# Patient Record
Sex: MALE | Race: WHITE | Employment: OTHER | URBAN - METROPOLITAN AREA
[De-identification: names, ages, dates, MRNs, and addresses within clinical notes are randomized per-mention and may not be internally consistent; named-entity substitution may affect disease eponyms.]

---

## 2024-11-14 ENCOUNTER — HOSPITAL ENCOUNTER (EMERGENCY)
Facility: HOSPITAL | Age: 74
Discharge: HOME | End: 2024-11-14
Attending: EMERGENCY MEDICINE
Payer: MEDICARE

## 2024-11-14 VITALS
SYSTOLIC BLOOD PRESSURE: 136 MMHG | OXYGEN SATURATION: 98 % | RESPIRATION RATE: 11 BRPM | DIASTOLIC BLOOD PRESSURE: 73 MMHG | WEIGHT: 182 LBS | HEIGHT: 67 IN | HEART RATE: 67 BPM | BODY MASS INDEX: 28.56 KG/M2 | TEMPERATURE: 98 F

## 2024-11-14 DIAGNOSIS — G47.00 INSOMNIA, UNSPECIFIED TYPE: ICD-10-CM

## 2024-11-14 DIAGNOSIS — F32.A DEPRESSION, UNSPECIFIED DEPRESSION TYPE: ICD-10-CM

## 2024-11-14 DIAGNOSIS — T50.905A MEDICATION REACTION, INITIAL ENCOUNTER: Primary | ICD-10-CM

## 2024-11-14 LAB
ALBUMIN SERPL-MCNC: 4.1 G/DL (ref 3.2–4.8)
ALBUMIN/GLOB SERPL: 1.5 {RATIO} (ref 1–2)
ALP LIVER SERPL-CCNC: 63 U/L
ALT SERPL-CCNC: 13 U/L
ANION GAP SERPL CALC-SCNC: 5 MMOL/L (ref 0–18)
AST SERPL-CCNC: 22 U/L (ref ?–34)
BASOPHILS # BLD AUTO: 0.04 X10(3) UL (ref 0–0.2)
BASOPHILS NFR BLD AUTO: 0.6 %
BILIRUB SERPL-MCNC: 0.9 MG/DL (ref 0.2–1.1)
BILIRUB UR QL STRIP.AUTO: NEGATIVE
BUN BLD-MCNC: 21 MG/DL (ref 9–23)
CALCIUM BLD-MCNC: 10.1 MG/DL (ref 8.7–10.4)
CHLORIDE SERPL-SCNC: 106 MMOL/L (ref 98–112)
CLARITY UR REFRACT.AUTO: CLEAR
CO2 SERPL-SCNC: 29 MMOL/L (ref 21–32)
COLOR UR AUTO: COLORLESS
CREAT BLD-MCNC: 1.3 MG/DL
EGFRCR SERPLBLD CKD-EPI 2021: 58 ML/MIN/1.73M2 (ref 60–?)
EOSINOPHIL # BLD AUTO: 0.15 X10(3) UL (ref 0–0.7)
EOSINOPHIL NFR BLD AUTO: 2.4 %
ERYTHROCYTE [DISTWIDTH] IN BLOOD BY AUTOMATED COUNT: 13.2 %
GLOBULIN PLAS-MCNC: 2.8 G/DL (ref 2–3.5)
GLUCOSE BLD-MCNC: 137 MG/DL (ref 70–99)
GLUCOSE UR STRIP.AUTO-MCNC: NORMAL MG/DL
HCT VFR BLD AUTO: 36.5 %
HGB BLD-MCNC: 12.4 G/DL
IMM GRANULOCYTES # BLD AUTO: 0.01 X10(3) UL (ref 0–1)
IMM GRANULOCYTES NFR BLD: 0.2 %
KETONES UR STRIP.AUTO-MCNC: NEGATIVE MG/DL
LEUKOCYTE ESTERASE UR QL STRIP.AUTO: NEGATIVE
LYMPHOCYTES # BLD AUTO: 1.26 X10(3) UL (ref 1–4)
LYMPHOCYTES NFR BLD AUTO: 20.4 %
MCH RBC QN AUTO: 29.5 PG (ref 26–34)
MCHC RBC AUTO-ENTMCNC: 34 G/DL (ref 31–37)
MCV RBC AUTO: 86.7 FL
MONOCYTES # BLD AUTO: 0.4 X10(3) UL (ref 0.1–1)
MONOCYTES NFR BLD AUTO: 6.5 %
NEUTROPHILS # BLD AUTO: 4.31 X10 (3) UL (ref 1.5–7.7)
NEUTROPHILS # BLD AUTO: 4.31 X10(3) UL (ref 1.5–7.7)
NEUTROPHILS NFR BLD AUTO: 69.9 %
NITRITE UR QL STRIP.AUTO: NEGATIVE
OSMOLALITY SERPL CALC.SUM OF ELEC: 295 MOSM/KG (ref 275–295)
PH UR STRIP.AUTO: 6 [PH] (ref 5–8)
PLATELET # BLD AUTO: 178 10(3)UL (ref 150–450)
POTASSIUM SERPL-SCNC: 3.7 MMOL/L (ref 3.5–5.1)
PROT SERPL-MCNC: 6.9 G/DL (ref 5.7–8.2)
PROT UR STRIP.AUTO-MCNC: NEGATIVE MG/DL
RBC # BLD AUTO: 4.21 X10(6)UL
SODIUM SERPL-SCNC: 140 MMOL/L (ref 136–145)
SP GR UR STRIP.AUTO: 1.01 (ref 1–1.03)
TSI SER-ACNC: 0.89 UIU/ML (ref 0.55–4.78)
UROBILINOGEN UR STRIP.AUTO-MCNC: NORMAL MG/DL
WBC # BLD AUTO: 6.2 X10(3) UL (ref 4–11)

## 2024-11-14 PROCEDURE — 84443 ASSAY THYROID STIM HORMONE: CPT | Performed by: EMERGENCY MEDICINE

## 2024-11-14 PROCEDURE — 99284 EMERGENCY DEPT VISIT MOD MDM: CPT

## 2024-11-14 PROCEDURE — 36415 COLL VENOUS BLD VENIPUNCTURE: CPT

## 2024-11-14 PROCEDURE — 99283 EMERGENCY DEPT VISIT LOW MDM: CPT

## 2024-11-14 PROCEDURE — 85025 COMPLETE CBC W/AUTO DIFF WBC: CPT | Performed by: EMERGENCY MEDICINE

## 2024-11-14 PROCEDURE — 81001 URINALYSIS AUTO W/SCOPE: CPT | Performed by: EMERGENCY MEDICINE

## 2024-11-14 PROCEDURE — 80053 COMPREHEN METABOLIC PANEL: CPT | Performed by: EMERGENCY MEDICINE

## 2024-11-14 RX ORDER — MAGNESIUM OXIDE 400 MG/1
400 TABLET ORAL NIGHTLY
Qty: 30 TABLET | Refills: 0 | Status: ON HOLD | OUTPATIENT
Start: 2024-11-14

## 2024-11-14 RX ORDER — BUPROPION HYDROCHLORIDE 150 MG/1
150 TABLET ORAL DAILY
Status: ON HOLD | COMMUNITY
Start: 2024-11-11

## 2024-11-14 RX ORDER — TEMAZEPAM 7.5 MG/1
7.5 CAPSULE ORAL NIGHTLY PRN
Qty: 10 CAPSULE | Refills: 0 | Status: ON HOLD | OUTPATIENT
Start: 2024-11-14

## 2024-11-14 RX ORDER — ALFUZOSIN HYDROCHLORIDE 10 MG/1
10 TABLET, EXTENDED RELEASE ORAL NIGHTLY
Status: ON HOLD | COMMUNITY
Start: 2024-08-30

## 2024-11-14 RX ORDER — METOPROLOL SUCCINATE 25 MG/1
25 TABLET, EXTENDED RELEASE ORAL DAILY
Status: ON HOLD | COMMUNITY
Start: 2024-06-14

## 2024-11-14 NOTE — ED INITIAL ASSESSMENT (HPI)
Pt in from home with wife and daughter. Pt's daughter reports hx of UTIs and he has had a catheter x2 months. Pt A&Ox4. Pt reports intermittent confusion. Pt was put on bupropion and Remeron on Tuesday  when he went to [facility]. Pt did not meet criteria for inpatient admission. Pt has had mood changes, lethargy, increased confusion.

## 2024-11-16 PROBLEM — F33.2 MDD (MAJOR DEPRESSIVE DISORDER), RECURRENT SEVERE, WITHOUT PSYCHOSIS (HCC): Status: ACTIVE | Noted: 2024-11-16

## 2024-11-17 ENCOUNTER — EXTERNAL LAB (OUTPATIENT)
Dept: HEALTH INFORMATION MANAGEMENT | Facility: OTHER | Age: 74
End: 2024-11-17

## 2024-11-17 LAB
ALBUMIN SERPL-MCNC: 3.6 G/DL (ref 3.2–4.8)
ALBUMIN/GLOB SERPL: 1.2 {RATIO} (ref 1–2)
ALP SERPL-CCNC: 51 U/L (ref 45–117)
ALT SERPL-CCNC: 13 U/L (ref 10–49)
ANION GAP SERPL CALC-SCNC: 2 MMOL/L (ref 0–18)
AST SERPL-CCNC: 16 U/L
BASOPHILS # BLD: 0.05 X10(3) UL (ref 0–0.2)
BASOPHILS NFR BLD: 0.8 %
BILIRUB SERPL-MCNC: 0.8 MG/DL (ref 0.2–1.1)
BUN SERPL-MCNC: 16 MG/DL (ref 9–23)
CALCIUM SERPL-MCNC: 9.8 MG/DL (ref 8.7–10.4)
CALCULATED OSMO: 291 MOSM/KG (ref 275–295)
CHLORIDE SERPL-SCNC: 110 MMOL/L (ref 98–112)
CHOLEST SERPL-MCNC: 120 MG/DL
CO2 SERPL-SCNC: 28 MMOL/L (ref 21–32)
CREAT SERPL-MCNC: 1.15 MG/DL (ref 0.7–1.3)
EOSINOPHIL # BLD: 0.35 X10(3) UL (ref 0–0.7)
EOSINOPHIL NFR BLD: 5.4 %
ERYTHROCYTE [DISTWIDTH] IN BLOOD: 13.2 %
EST. AVERAGE GLUCOSE BLD GHB EST-MCNC: 123 MG/DL (ref 68–126)
GFR SERPLBLD SCHWARTZ-ARVRAT: 67 ML/MIN/1.73M2
GLOBULIN SER-MCNC: 2.9 G/DL (ref 2–3.5)
GLUCOSE SERPL-MCNC: 88 MG/DL (ref 70–99)
HBA1C MFR BLD: 5.9 %
HCT VFR BLD CALC: 32.3 % (ref 39–53)
HDLC SERPL-MCNC: 52 MG/DL (ref 40–59)
HGB BLD-MCNC: 11.6 G/DL (ref 13–17.5)
IMM GRANULOCYTES # BLD: 0.01 X10(3) UL (ref 0–1)
IMM GRANULOCYTES NFR BLD: 0.2 %
LDLC SERPL CALC-MCNC: 49 MG/DL
LENGTH OF FAST TIME PATIENT: NORMAL H
LENGTH OF FAST TIME PATIENT: NORMAL H
LYMPHOCYTES # BLD: 1.96 X10(3) UL (ref 1–4)
LYMPHOCYTES NFR BLD: 30.1 %
MCH RBC QN AUTO: 29.8 PG (ref 26–34)
MCHC RBC AUTO-ENTMCNC: 35.9 G/DL (ref 31–37)
MCV RBC AUTO: 83 FL (ref 80–100)
MONOCYTES # BLD: 0.53 X10(3) UL (ref 0.1–1)
MONOCYTES NFR BLD: 8.1 %
NEUTROPHILS # BLD: 3.62 X10(3) UL (ref 1.5–7.7)
NEUTROPHILS NFR BLD: 55.4 %
NONHDLC SERPL-MCNC: 68 MG/DL
PLATELET # BLD: 182 10(3)UL (ref 150–450)
POTASSIUM SERPL-SCNC: 3.7 MMOL/L (ref 3.5–5.1)
PROT SERPL-MCNC: 6.5 G/DL (ref 5.7–8.2)
RBC # BLD: 3.89 X10(6)UL (ref 3.8–5.8)
SODIUM SERPL-SCNC: 140 MMOL/L (ref 136–145)
TRIGL SERPL-MCNC: 102 MG/DL (ref 30–149)
VLDLC SERPL CALC-MCNC: 15 MG/DL (ref 0–30)
WBC # BLD: 6.5 X10(3) UL (ref 4–11)

## 2024-11-18 PROBLEM — F41.1 GENERALIZED ANXIETY DISORDER: Status: ACTIVE | Noted: 2024-11-18

## 2024-11-18 PROBLEM — F51.05 INSOMNIA RELATED TO ANOTHER MENTAL DISORDER: Status: ACTIVE | Noted: 2024-11-18

## 2024-11-26 RX ORDER — METOPROLOL SUCCINATE 25 MG/1
25 TABLET, EXTENDED RELEASE ORAL EVERY MORNING
COMMUNITY

## 2024-11-26 RX ORDER — SULFAMETHOXAZOLE AND TRIMETHOPRIM 800; 160 MG/1; MG/1
1 TABLET ORAL 2 TIMES DAILY
COMMUNITY

## 2024-11-26 RX ORDER — DOXEPIN HYDROCHLORIDE 50 MG/1
50 CAPSULE ORAL NIGHTLY
COMMUNITY

## 2024-11-26 RX ORDER — DULOXETIN HYDROCHLORIDE 30 MG/1
30 CAPSULE, DELAYED RELEASE ORAL EVERY MORNING
COMMUNITY

## 2024-11-26 RX ORDER — LORAZEPAM 0.5 MG/1
0.5 TABLET ORAL PRN
COMMUNITY

## 2024-11-26 RX ORDER — PRAVASTATIN SODIUM 40 MG
40 TABLET ORAL NIGHTLY
COMMUNITY

## 2024-11-26 RX ORDER — MULTIVIT-MIN/IRON/FOLIC ACID/K 18-600-40
1 CAPSULE ORAL DAILY
COMMUNITY

## 2024-11-26 RX ORDER — LISINOPRIL AND HYDROCHLOROTHIAZIDE 10; 12.5 MG/1; MG/1
1 TABLET ORAL EVERY MORNING
COMMUNITY

## 2024-11-26 ASSESSMENT — ACTIVITIES OF DAILY LIVING (ADL)
ADL_SCORE: 12
ADL_BEFORE_ADMISSION: INDEPENDENT
SENSORY_SUPPORT_DEVICES: EYEGLASSES

## 2024-11-28 ENCOUNTER — ANESTHESIA EVENT (OUTPATIENT)
Dept: SURGERY | Age: 74
End: 2024-11-28

## 2024-11-29 ENCOUNTER — HOSPITAL ENCOUNTER (INPATIENT)
Age: 74
LOS: 1 days | Discharge: HOME | DRG: 714 | End: 2024-11-30
Attending: UROLOGY | Admitting: UROLOGY

## 2024-11-29 ENCOUNTER — ANESTHESIA (OUTPATIENT)
Dept: SURGERY | Age: 74
End: 2024-11-29

## 2024-11-29 PROCEDURE — 13000002 HB ANESTHESIA  GENERAL  S/U + 1ST 15 MIN: Performed by: UROLOGY

## 2024-11-29 PROCEDURE — 88305 TISSUE EXAM BY PATHOLOGIST: CPT | Performed by: UROLOGY

## 2024-11-29 PROCEDURE — 10002800 HB RX 250 W HCPCS: Performed by: UROLOGY

## 2024-11-29 PROCEDURE — 10004451 HB PACU RECOVERY 1ST 30 MINUTES: Performed by: UROLOGY

## 2024-11-29 PROCEDURE — 10002803 HB RX 637: Performed by: UROLOGY

## 2024-11-29 PROCEDURE — 10002807 HB RX 258: Performed by: SPECIALIST

## 2024-11-29 PROCEDURE — 10002803 HB RX 637: Performed by: SPECIALIST

## 2024-11-29 PROCEDURE — 13000037 HB COMPLEX CASE EACH ADD MINUTE: Performed by: UROLOGY

## 2024-11-29 PROCEDURE — 0TBC8ZZ EXCISION OF BLADDER NECK, VIA NATURAL OR ARTIFICIAL OPENING ENDOSCOPIC: ICD-10-PCS | Performed by: UROLOGY

## 2024-11-29 PROCEDURE — 10002800 HB RX 250 W HCPCS

## 2024-11-29 PROCEDURE — G0378 HOSPITAL OBSERVATION PER HR: HCPCS

## 2024-11-29 PROCEDURE — 10002801 HB RX 250 W/O HCPCS

## 2024-11-29 PROCEDURE — 10006023 HB SUPPLY 272: Performed by: UROLOGY

## 2024-11-29 PROCEDURE — 10002807 HB RX 258

## 2024-11-29 PROCEDURE — C1758 CATHETER, URETERAL: HCPCS | Performed by: UROLOGY

## 2024-11-29 PROCEDURE — 10004651 HB RX, NO CHARGE ITEM: Performed by: UROLOGY

## 2024-11-29 PROCEDURE — 10002801 HB RX 250 W/O HCPCS: Performed by: UROLOGY

## 2024-11-29 PROCEDURE — 0V508ZZ DESTRUCTION OF PROSTATE, VIA NATURAL OR ARTIFICIAL OPENING ENDOSCOPIC: ICD-10-PCS | Performed by: UROLOGY

## 2024-11-29 PROCEDURE — 10004452 HB PACU ADDL 30 MINUTES: Performed by: UROLOGY

## 2024-11-29 PROCEDURE — 13000003 HB ANESTHESIA  GENERAL EA ADD MINUTE: Performed by: UROLOGY

## 2024-11-29 PROCEDURE — 13000036 HB COMPLEX  CASE S/U + 1ST 15 MIN: Performed by: UROLOGY

## 2024-11-29 RX ORDER — ONDANSETRON 2 MG/ML
4 INJECTION INTRAMUSCULAR; INTRAVENOUS 2 TIMES DAILY PRN
Status: DISCONTINUED | OUTPATIENT
Start: 2024-11-29 | End: 2024-11-29 | Stop reason: HOSPADM

## 2024-11-29 RX ORDER — SODIUM CHLORIDE, SODIUM LACTATE, POTASSIUM CHLORIDE, CALCIUM CHLORIDE 600; 310; 30; 20 MG/100ML; MG/100ML; MG/100ML; MG/100ML
INJECTION, SOLUTION INTRAVENOUS CONTINUOUS PRN
Status: DISCONTINUED | OUTPATIENT
Start: 2024-11-29 | End: 2024-11-29

## 2024-11-29 RX ORDER — DROPERIDOL 2.5 MG/ML
0.62 INJECTION, SOLUTION INTRAMUSCULAR; INTRAVENOUS
Status: DISCONTINUED | OUTPATIENT
Start: 2024-11-29 | End: 2024-11-29 | Stop reason: HOSPADM

## 2024-11-29 RX ORDER — 0.9 % SODIUM CHLORIDE 0.9 %
10 VIAL (ML) INJECTION PRN
Status: DISCONTINUED | OUTPATIENT
Start: 2024-11-29 | End: 2024-11-30 | Stop reason: HOSPADM

## 2024-11-29 RX ORDER — SULFAMETHOXAZOLE AND TRIMETHOPRIM 800; 160 MG/1; MG/1
1 TABLET ORAL 2 TIMES DAILY
Status: DISCONTINUED | OUTPATIENT
Start: 2024-11-29 | End: 2024-11-30 | Stop reason: HOSPADM

## 2024-11-29 RX ORDER — DEXAMETHASONE SODIUM PHOSPHATE 4 MG/ML
INJECTION, SOLUTION INTRA-ARTICULAR; INTRALESIONAL; INTRAMUSCULAR; INTRAVENOUS; SOFT TISSUE PRN
Status: DISCONTINUED | OUTPATIENT
Start: 2024-11-29 | End: 2024-11-29

## 2024-11-29 RX ORDER — TRAMADOL HYDROCHLORIDE 50 MG/1
50 TABLET ORAL EVERY 6 HOURS PRN
Status: DISCONTINUED | OUTPATIENT
Start: 2024-11-29 | End: 2024-11-30 | Stop reason: HOSPADM

## 2024-11-29 RX ORDER — ENALAPRILAT 1.25 MG/ML
1.25 INJECTION INTRAVENOUS
Status: DISCONTINUED | OUTPATIENT
Start: 2024-11-29 | End: 2024-11-29 | Stop reason: HOSPADM

## 2024-11-29 RX ORDER — PROCHLORPERAZINE EDISYLATE 5 MG/ML
5 INJECTION INTRAMUSCULAR; INTRAVENOUS EVERY 4 HOURS PRN
Status: DISCONTINUED | OUTPATIENT
Start: 2024-11-29 | End: 2024-11-29 | Stop reason: HOSPADM

## 2024-11-29 RX ORDER — ROCURONIUM BROMIDE 10 MG/ML
INJECTION, SOLUTION INTRAVENOUS PRN
Status: DISCONTINUED | OUTPATIENT
Start: 2024-11-29 | End: 2024-11-29

## 2024-11-29 RX ORDER — FAMOTIDINE 20 MG/1
20 TABLET, FILM COATED ORAL ONCE
Status: COMPLETED | OUTPATIENT
Start: 2024-11-29 | End: 2024-11-29

## 2024-11-29 RX ORDER — OXYBUTYNIN CHLORIDE 5 MG/1
5 TABLET ORAL 3 TIMES DAILY PRN
Status: DISCONTINUED | OUTPATIENT
Start: 2024-11-29 | End: 2024-11-30 | Stop reason: HOSPADM

## 2024-11-29 RX ORDER — MEPERIDINE HYDROCHLORIDE 50 MG/ML
12.5 INJECTION INTRAMUSCULAR; INTRAVENOUS; SUBCUTANEOUS EVERY 10 MIN PRN
Status: DISCONTINUED | OUTPATIENT
Start: 2024-11-29 | End: 2024-11-29 | Stop reason: HOSPADM

## 2024-11-29 RX ORDER — SODIUM CHLORIDE, SODIUM LACTATE, POTASSIUM CHLORIDE, CALCIUM CHLORIDE 600; 310; 30; 20 MG/100ML; MG/100ML; MG/100ML; MG/100ML
INJECTION, SOLUTION INTRAVENOUS CONTINUOUS
Status: DISCONTINUED | OUTPATIENT
Start: 2024-11-29 | End: 2024-11-29

## 2024-11-29 RX ORDER — OXYCODONE HYDROCHLORIDE 5 MG/1
5 TABLET ORAL
Status: DISCONTINUED | OUTPATIENT
Start: 2024-11-29 | End: 2024-11-29 | Stop reason: HOSPADM

## 2024-11-29 RX ORDER — PHENAZOPYRIDINE HYDROCHLORIDE 200 MG/1
200 TABLET, FILM COATED ORAL 3 TIMES DAILY PRN
Status: DISCONTINUED | OUTPATIENT
Start: 2024-11-29 | End: 2024-11-30 | Stop reason: HOSPADM

## 2024-11-29 RX ORDER — ONDANSETRON 2 MG/ML
INJECTION INTRAMUSCULAR; INTRAVENOUS PRN
Status: DISCONTINUED | OUTPATIENT
Start: 2024-11-29 | End: 2024-11-29

## 2024-11-29 RX ORDER — HYDRALAZINE HYDROCHLORIDE 20 MG/ML
5 INJECTION INTRAMUSCULAR; INTRAVENOUS EVERY 10 MIN PRN
Status: DISCONTINUED | OUTPATIENT
Start: 2024-11-29 | End: 2024-11-29 | Stop reason: HOSPADM

## 2024-11-29 RX ORDER — HYDROCODONE BITARTRATE AND ACETAMINOPHEN 5; 325 MG/1; MG/1
1 TABLET ORAL
Status: DISCONTINUED | OUTPATIENT
Start: 2024-11-29 | End: 2024-11-29 | Stop reason: HOSPADM

## 2024-11-29 RX ORDER — 0.9 % SODIUM CHLORIDE 0.9 %
2 VIAL (ML) INJECTION EVERY 12 HOURS SCHEDULED
Status: DISCONTINUED | OUTPATIENT
Start: 2024-11-29 | End: 2024-11-30 | Stop reason: HOSPADM

## 2024-11-29 RX ORDER — PROMETHAZINE HYDROCHLORIDE 25 MG/1
25 TABLET ORAL EVERY 6 HOURS PRN
Status: DISCONTINUED | OUTPATIENT
Start: 2024-11-29 | End: 2024-11-29 | Stop reason: HOSPADM

## 2024-11-29 RX ORDER — PROPOFOL 10 MG/ML
INJECTION, EMULSION INTRAVENOUS PRN
Status: DISCONTINUED | OUTPATIENT
Start: 2024-11-29 | End: 2024-11-29

## 2024-11-29 RX ORDER — EPHEDRINE SULFATE/0.9% NACL/PF 50 MG/10ML
SYRINGE (ML) INTRAVENOUS PRN
Status: DISCONTINUED | OUTPATIENT
Start: 2024-11-29 | End: 2024-11-29

## 2024-11-29 RX ADMIN — SODIUM CHLORIDE, PRESERVATIVE FREE 2 ML: 5 INJECTION INTRAVENOUS at 20:43

## 2024-11-29 RX ADMIN — SODIUM CHLORIDE, POTASSIUM CHLORIDE, SODIUM LACTATE AND CALCIUM CHLORIDE: 600; 310; 30; 20 INJECTION, SOLUTION INTRAVENOUS at 07:59

## 2024-11-29 RX ADMIN — FAMOTIDINE 20 MG: 20 TABLET ORAL at 06:25

## 2024-11-29 RX ADMIN — FENTANYL CITRATE 50 MCG: 50 INJECTION INTRAMUSCULAR; INTRAVENOUS at 09:06

## 2024-11-29 RX ADMIN — EPHEDRINE SULFATE 10 MG: 5 INJECTION INTRAVENOUS at 08:46

## 2024-11-29 RX ADMIN — SUGAMMADEX 200 MG: 100 INJECTION, SOLUTION INTRAVENOUS at 10:02

## 2024-11-29 RX ADMIN — SULFAMETHOXAZOLE AND TRIMETHOPRIM 1 TABLET: 800; 160 TABLET ORAL at 20:43

## 2024-11-29 RX ADMIN — DEXAMETHASONE SODIUM PHOSPHATE 4 MG: 4 INJECTION INTRA-ARTICULAR; INTRALESIONAL; INTRAMUSCULAR; INTRAVENOUS; SOFT TISSUE at 08:19

## 2024-11-29 RX ADMIN — PROPOFOL 200 MG: 10 INJECTION, EMULSION INTRAVENOUS at 08:06

## 2024-11-29 RX ADMIN — HYDROMORPHONE HYDROCHLORIDE 0.2 MG: 1 INJECTION, SOLUTION INTRAMUSCULAR; INTRAVENOUS; SUBCUTANEOUS at 10:50

## 2024-11-29 RX ADMIN — ROCURONIUM BROMIDE 50 MG: 10 INJECTION INTRAVENOUS at 08:06

## 2024-11-29 RX ADMIN — SODIUM CHLORIDE, POTASSIUM CHLORIDE, SODIUM LACTATE AND CALCIUM CHLORIDE: 600; 310; 30; 20 INJECTION, SOLUTION INTRAVENOUS at 12:22

## 2024-11-29 RX ADMIN — HYDROMORPHONE HYDROCHLORIDE 0.2 MG: 1 INJECTION, SOLUTION INTRAMUSCULAR; INTRAVENOUS; SUBCUTANEOUS at 11:00

## 2024-11-29 RX ADMIN — ONDANSETRON 4 MG: 2 INJECTION INTRAMUSCULAR; INTRAVENOUS at 10:02

## 2024-11-29 RX ADMIN — EPHEDRINE SULFATE 10 MG: 5 INJECTION INTRAVENOUS at 08:25

## 2024-11-29 RX ADMIN — SODIUM CHLORIDE, POTASSIUM CHLORIDE, SODIUM LACTATE AND CALCIUM CHLORIDE: 600; 310; 30; 20 INJECTION, SOLUTION INTRAVENOUS at 06:36

## 2024-11-29 RX ADMIN — ROCURONIUM BROMIDE 20 MG: 10 INJECTION INTRAVENOUS at 09:03

## 2024-11-29 RX ADMIN — EPHEDRINE SULFATE 15 MG: 5 INJECTION INTRAVENOUS at 08:50

## 2024-11-29 RX ADMIN — FENTANYL CITRATE 50 MCG: 50 INJECTION INTRAMUSCULAR; INTRAVENOUS at 08:01

## 2024-11-29 RX ADMIN — EPHEDRINE SULFATE 15 MG: 5 INJECTION INTRAVENOUS at 08:38

## 2024-11-29 RX ADMIN — WATER 2000 MG: 1 INJECTION INTRAMUSCULAR; INTRAVENOUS; SUBCUTANEOUS at 08:10

## 2024-11-29 SDOH — HEALTH STABILITY: GENERAL: BECAUSE OF A PHYSICAL, MENTAL, OR EMOTIONAL CONDITION, DO YOU HAVE DIFFICULTY DOING ERRANDS ALONE?: NO

## 2024-11-29 SDOH — ECONOMIC STABILITY: GENERAL

## 2024-11-29 SDOH — ECONOMIC STABILITY: TRANSPORTATION INSECURITY
IN THE PAST 12 MONTHS, HAS LACK OF RELIABLE TRANSPORTATION KEPT YOU FROM MEDICAL APPOINTMENTS, MEETINGS, WORK OR FROM GETTING THINGS NEEDED FOR DAILY LIVING?: NO;YES

## 2024-11-29 SDOH — SOCIAL STABILITY: SOCIAL INSECURITY: HOW OFTEN DOES ANYONE, INCLUDING FAMILY AND FRIENDS, SCREAM OR CURSE AT YOU?: NEVER

## 2024-11-29 SDOH — SOCIAL STABILITY: SOCIAL INSECURITY: HOW OFTEN DOES ANYONE, INCLUDING FAMILY AND FRIENDS, PHYSICALLY HURT YOU?: NEVER

## 2024-11-29 SDOH — HEALTH STABILITY: PHYSICAL HEALTH: DO YOU HAVE DIFFICULTY DRESSING OR BATHING?: NO

## 2024-11-29 SDOH — SOCIAL STABILITY: SOCIAL INSECURITY: HOW OFTEN DOES ANYONE, INCLUDING FAMILY AND FRIENDS, INSULT OR TALK DOWN TO YOU?: NEVER

## 2024-11-29 SDOH — ECONOMIC STABILITY: FOOD INSECURITY: WITHIN THE PAST 12 MONTHS, THE FOOD YOU BOUGHT JUST DIDN'T LAST AND YOU DIDN'T HAVE MONEY TO GET MORE.: NEVER TRUE

## 2024-11-29 SDOH — SOCIAL STABILITY: SOCIAL INSECURITY: HOW OFTEN DOES ANYONE, INCLUDING FAMILY AND FRIENDS, THREATEN YOU WITH HARM?: NEVER

## 2024-11-29 SDOH — HEALTH STABILITY: GENERAL
BECAUSE OF A PHYSICAL, MENTAL, OR EMOTIONAL CONDITION, DO YOU HAVE SERIOUS DIFFICULTY CONCENTRATING, REMEMBERING OR MAKING DECISIONS?: YES

## 2024-11-29 SDOH — ECONOMIC STABILITY: HOUSING INSECURITY: DO YOU HAVE PROBLEMS WITH ANY OF THE FOLLOWING?: NONE OF THE ABOVE

## 2024-11-29 SDOH — ECONOMIC STABILITY: HOUSING INSECURITY: WHAT IS YOUR LIVING SITUATION TODAY?: SPOUSE

## 2024-11-29 SDOH — ECONOMIC STABILITY: HOUSING INSECURITY: WHAT IS YOUR LIVING SITUATION TODAY?: I HAVE A STEADY PLACE TO LIVE

## 2024-11-29 SDOH — ECONOMIC STABILITY: INCOME INSECURITY: IN THE PAST 12 MONTHS, HAS THE ELECTRIC, GAS, OIL, OR WATER COMPANY THREATENED TO SHUT OFF SERVICE IN YOUR HOME?: NO

## 2024-11-29 SDOH — SOCIAL STABILITY: SOCIAL NETWORK: SUPPORT SYSTEMS: SPOUSE;CHILDREN;SIBLINGS

## 2024-11-29 SDOH — ECONOMIC STABILITY: GENERAL: WOULD YOU LIKE HELP WITH ANY OF THE FOLLOWING NEEDS?: I DON'T WANT HELP WITH ANY OF THESE

## 2024-11-29 SDOH — HEALTH STABILITY: PHYSICAL HEALTH: DO YOU HAVE SERIOUS DIFFICULTY WALKING OR CLIMBING STAIRS?: NO

## 2024-11-29 SDOH — ECONOMIC STABILITY: HOUSING INSECURITY: WHAT IS YOUR LIVING SITUATION TODAY?: HOUSE

## 2024-11-29 ASSESSMENT — PATIENT HEALTH QUESTIONNAIRE - PHQ9
2. FEELING DOWN, DEPRESSED OR HOPELESS: NEARLY EVERY DAY
SUM OF ALL RESPONSES TO PHQ QUESTIONS 1-9: 18
SUM OF ALL RESPONSES TO PHQ9 QUESTIONS 1 AND 2: 3
CLINICAL INTERPRETATION OF PHQ9 SCORE: MODERATELY SEVERE DEPRESSION
6. FEELING BAD ABOUT YOURSELF - OR THAT YOU ARE A FAILURE OR HAVE LET YOURSELF OR YOUR FAMILY DOWN: NEARLY EVERY DAY
3. TROUBLE FALLING OR STAYING ASLEEP OR SLEEPING TOO MUCH: NEARLY EVERY DAY
9. THOUGHTS THAT YOU WOULD BE BETTER OFF DEAD, OR OF HURTING YOURSELF: SEVERAL DAYS
7. TROUBLE CONCENTRATING ON THINGS, SUCH AS READING THE NEWSPAPER OR WATCHING TELEVISION: NEARLY EVERY DAY
5. POOR APPETITE OR OVEREATING: SEVERAL DAYS
8. MOVING OR SPEAKING SO SLOWLY THAT OTHER PEOPLE COULD HAVE NOTICED. OR THE OPPOSITE, BEING SO FIGETY OR RESTLESS THAT YOU HAVE BEEN MOVING AROUND A LOT MORE THAN USUAL: NEARLY EVERY DAY
10. IF YOU CHECKED OFF ANY PROBLEMS, HOW DIFFICULT HAVE THESE PROBLEMS MADE IT FOR YOU TO DO YOUR WORK, TAKE CARE OF THINGS AT HOME, OR GET ALONG WITH OTHER PEOPLE: VERY DIFFICULT
1. LITTLE INTEREST OR PLEASURE IN DOING THINGS: NOT AT ALL
CLINICAL INTERPRETATION OF PHQ2 SCORE: FURTHER SCREENING NEEDED
IS PATIENT ABLE TO COMPLETE PHQ2 OR PHQ9: YES
SUM OF ALL RESPONSES TO PHQ9 QUESTIONS 1 AND 2: 3
4. FEELING TIRED OR HAVING LITTLE ENERGY: SEVERAL DAYS

## 2024-11-29 ASSESSMENT — ACTIVITIES OF DAILY LIVING (ADL)
RECENT_DECLINE_ADL: NO
ADL_SCORE: 10
CONTINENCE: INDEPENDENT
BATHING: NEEDS ASSISTANCE
ADL_SHORT_OF_BREATH: NO
TRANSFERRING: INDEPENDENT
DRESSING YOURSELF: NEEDS ASSISTANCE
TOILETING: INDEPENDENT
FEEDING YOURSELF: INDEPENDENT
ADL_BEFORE_ADMISSION: NEEDS/REQUIRES ASSISTANCE
CHRONIC_PAIN_PRESENT: NO

## 2024-11-29 ASSESSMENT — PAIN SCALES - GENERAL
PAINLEVEL_OUTOF10: 0
PAINLEVEL_OUTOF10: 0
PAINLEVEL_OUTOF10: 4
PAINLEVEL_OUTOF10: 6
PAINLEVEL_OUTOF10: 4
PAINLEVEL_OUTOF10: 2
PAINLEVEL_OUTOF10: 0
PAINLEVEL_OUTOF10: 6
PAINLEVEL_OUTOF10: 4
PAINLEVEL_OUTOF10: 0

## 2024-11-29 ASSESSMENT — COLUMBIA-SUICIDE SEVERITY RATING SCALE - C-SSRS
6. HAVE YOU EVER DONE ANYTHING, STARTED TO DO ANYTHING, OR PREPARED TO DO ANYTHING TO END YOUR LIFE?: YES
HOW LONG AGO DID YOU DO ANY OF THESE?: OVER A YEAR AGO
5. HAVE YOU STARTED TO WORK OUT OR WORKED OUT THE DETAILS OF HOW TO KILL YOURSELF? DO YOU INTEND TO CARRY OUT THIS PLAN?: NO
3. HAVE YOU BEEN THINKING ABOUT HOW YOU MIGHT KILL YOURSELF?: NO
1. WITHIN THE PAST MONTH, HAVE YOU WISHED YOU WERE DEAD OR WISHED YOU COULD GO TO SLEEP AND NOT WAKE UP?: YES
2. HAVE YOU ACTUALLY HAD ANY THOUGHTS OF KILLING YOURSELF?: YES
IS THE PATIENT ABLE TO COMPLETE C-SSRS: YES
4. HAVE YOU HAD THESE THOUGHTS AND HAD SOME INTENTION OF ACTING ON THEM?: NO

## 2024-11-29 ASSESSMENT — ORIENTATION MEMORY CONCENTRATION TEST (OMCT)
WHAT MONTH IS IT NOW: CORRECT
REPEAT THE NAME AND ADDRESS I ASKED YOU TO REMEMBER: 1 ERROR
WHAT TIME IS IT (NO WATCH OR CLOCK): CORRECT
WHAT YEAR IS IT NOW (MUST BE EXACT): CORRECT
COUNT BACKWARDS FROM 20 TO 1: CORRECT
OMCT SCORE: 2
OMCT INTERPRETATION: 0-6: NO SIGNIFICANT IMPAIRMENT
SAY THE MONTHS IN REVERSE ORDER STARTING WITH LAST MONTH: CORRECT

## 2024-11-29 ASSESSMENT — LIFESTYLE VARIABLES
HOW MANY STANDARD DRINKS CONTAINING ALCOHOL DO YOU HAVE ON A TYPICAL DAY: 0,1 OR 2
ALCOHOL_USE_STATUS: NO OR LOW RISK WITH VALIDATED TOOL
AUDIT-C TOTAL SCORE: 1
HOW OFTEN DO YOU HAVE A DRINK CONTAINING ALCOHOL: NEVER
HOW OFTEN DO YOU HAVE 6 OR MORE DRINKS ON ONE OCCASION: NEVER
HOW OFTEN DO YOU HAVE A DRINK CONTAINING ALCOHOL: MONTHLY OR LESS

## 2024-11-29 ASSESSMENT — PAIN DESCRIPTION - PAIN TYPE
TYPE: ACUTE PAIN

## 2024-11-30 VITALS
SYSTOLIC BLOOD PRESSURE: 139 MMHG | RESPIRATION RATE: 16 BRPM | HEART RATE: 87 BPM | OXYGEN SATURATION: 95 % | WEIGHT: 180.12 LBS | HEIGHT: 67 IN | BODY MASS INDEX: 28.27 KG/M2 | TEMPERATURE: 98.4 F | DIASTOLIC BLOOD PRESSURE: 76 MMHG

## 2024-11-30 PROCEDURE — 10000002 HB ROOM CHARGE MED SURG

## 2024-11-30 PROCEDURE — 10004651 HB RX, NO CHARGE ITEM: Performed by: UROLOGY

## 2024-11-30 PROCEDURE — G0378 HOSPITAL OBSERVATION PER HR: HCPCS

## 2024-11-30 PROCEDURE — 10002803 HB RX 637: Performed by: UROLOGY

## 2024-11-30 RX ORDER — METOPROLOL SUCCINATE 25 MG/1
25 TABLET, EXTENDED RELEASE ORAL EVERY MORNING
Status: DISCONTINUED | OUTPATIENT
Start: 2024-11-30 | End: 2024-11-30 | Stop reason: HOSPADM

## 2024-11-30 RX ADMIN — SULFAMETHOXAZOLE AND TRIMETHOPRIM 1 TABLET: 800; 160 TABLET ORAL at 09:02

## 2024-11-30 RX ADMIN — LISINOPRIL: 10 TABLET ORAL at 11:30

## 2024-11-30 RX ADMIN — SODIUM CHLORIDE, PRESERVATIVE FREE 2 ML: 5 INJECTION INTRAVENOUS at 09:02

## 2024-11-30 RX ADMIN — METOPROLOL SUCCINATE 25 MG: 25 TABLET, EXTENDED RELEASE ORAL at 11:30

## 2024-11-30 ASSESSMENT — PAIN SCALES - GENERAL: PAINLEVEL_OUTOF10: 0

## 2024-12-02 ENCOUNTER — TELEPHONE (OUTPATIENT)
Dept: CARE COORDINATION | Age: 74
End: 2024-12-02

## 2024-12-03 LAB
ASR DISCLAIMER: NORMAL
CASE RPRT: NORMAL
CLINICAL INFO: NORMAL
PATH REPORT.FINAL DX SPEC: NORMAL
PATH REPORT.GROSS SPEC: NORMAL

## 2024-12-09 ENCOUNTER — TELEPHONE (OUTPATIENT)
Dept: CARE COORDINATION | Age: 74
End: 2024-12-09

## 2024-12-16 ENCOUNTER — TELEPHONE (OUTPATIENT)
Dept: CARE COORDINATION | Age: 74
End: 2024-12-16

## 2024-12-18 ENCOUNTER — EKG ENCOUNTER (OUTPATIENT)
Dept: LAB | Facility: HOSPITAL | Age: 74
End: 2024-12-18
Attending: Other
Payer: MEDICARE

## 2024-12-18 DIAGNOSIS — F33.2 SEVERE RECURRENT MAJOR DEPRESSION WITHOUT PSYCHOTIC FEATURES (HCC): ICD-10-CM

## 2024-12-18 LAB
ATRIAL RATE: 82 BPM
P AXIS: 56 DEGREES
P-R INTERVAL: 156 MS
Q-T INTERVAL: 360 MS
QRS DURATION: 84 MS
QTC CALCULATION (BEZET): 420 MS
R AXIS: -14 DEGREES
T AXIS: 19 DEGREES
VENTRICULAR RATE: 82 BPM

## 2024-12-18 PROCEDURE — 93005 ELECTROCARDIOGRAM TRACING: CPT

## 2024-12-18 PROCEDURE — 93010 ELECTROCARDIOGRAM REPORT: CPT | Performed by: INTERNAL MEDICINE

## 2024-12-19 RX ORDER — MULTIVIT-MIN/IRON FUM/FOLIC AC 7.5 MG-4
1 TABLET ORAL DAILY
COMMUNITY

## 2024-12-20 ENCOUNTER — ANESTHESIA (OUTPATIENT)
Dept: POSTOP/PACU | Facility: HOSPITAL | Age: 74
End: 2024-12-20
Payer: MEDICARE

## 2024-12-20 ENCOUNTER — ANESTHESIA EVENT (OUTPATIENT)
Dept: POSTOP/PACU | Facility: HOSPITAL | Age: 74
End: 2024-12-20
Payer: MEDICARE

## 2024-12-20 ENCOUNTER — HOSPITAL ENCOUNTER (OUTPATIENT)
Dept: POSTOP/PACU | Facility: HOSPITAL | Age: 74
Discharge: HOME | End: 2024-12-20
Attending: Other
Payer: MEDICARE

## 2024-12-20 VITALS
HEIGHT: 67 IN | HEART RATE: 72 BPM | TEMPERATURE: 99 F | RESPIRATION RATE: 14 BRPM | DIASTOLIC BLOOD PRESSURE: 83 MMHG | WEIGHT: 182 LBS | SYSTOLIC BLOOD PRESSURE: 149 MMHG | OXYGEN SATURATION: 96 % | BODY MASS INDEX: 28.56 KG/M2

## 2024-12-20 DIAGNOSIS — F33.2 SEVERE RECURRENT MAJOR DEPRESSION WITHOUT PSYCHOTIC FEATURES (HCC): ICD-10-CM

## 2024-12-20 RX ORDER — LABETALOL HYDROCHLORIDE 5 MG/ML
INJECTION, SOLUTION INTRAVENOUS AS NEEDED
Status: DISCONTINUED | OUTPATIENT
Start: 2024-12-20 | End: 2024-12-20 | Stop reason: SURG

## 2024-12-20 RX ORDER — LABETALOL HYDROCHLORIDE 5 MG/ML
5 INJECTION, SOLUTION INTRAVENOUS EVERY 5 MIN PRN
Status: ACTIVE | OUTPATIENT
Start: 2024-12-20 | End: 2024-12-20

## 2024-12-20 RX ORDER — HYDROCODONE BITARTRATE AND ACETAMINOPHEN 5; 325 MG/1; MG/1
1 TABLET ORAL ONCE AS NEEDED
Status: ACTIVE | OUTPATIENT
Start: 2024-12-20 | End: 2024-12-20

## 2024-12-20 RX ORDER — HYDROMORPHONE HYDROCHLORIDE 1 MG/ML
0.4 INJECTION, SOLUTION INTRAMUSCULAR; INTRAVENOUS; SUBCUTANEOUS EVERY 5 MIN PRN
Status: ACTIVE | OUTPATIENT
Start: 2024-12-20 | End: 2024-12-20

## 2024-12-20 RX ORDER — CAFFEINE AND SODIUM BENZOATE 125 MG/ML
125 INJECTION, SOLUTION INTRAMUSCULAR; INTRAVENOUS ONCE
Status: COMPLETED | OUTPATIENT
Start: 2024-12-20 | End: 2024-12-20

## 2024-12-20 RX ORDER — KETOROLAC TROMETHAMINE 30 MG/ML
15 INJECTION, SOLUTION INTRAMUSCULAR; INTRAVENOUS ONCE
Status: COMPLETED | OUTPATIENT
Start: 2024-12-20 | End: 2024-12-20

## 2024-12-20 RX ORDER — HYDROCODONE BITARTRATE AND ACETAMINOPHEN 5; 325 MG/1; MG/1
2 TABLET ORAL ONCE AS NEEDED
Status: ACTIVE | OUTPATIENT
Start: 2024-12-20 | End: 2024-12-20

## 2024-12-20 RX ORDER — SODIUM CHLORIDE, SODIUM LACTATE, POTASSIUM CHLORIDE, CALCIUM CHLORIDE 600; 310; 30; 20 MG/100ML; MG/100ML; MG/100ML; MG/100ML
INJECTION, SOLUTION INTRAVENOUS CONTINUOUS
Status: DISCONTINUED | OUTPATIENT
Start: 2024-12-20 | End: 2024-12-22

## 2024-12-20 RX ORDER — NALOXONE HYDROCHLORIDE 0.4 MG/ML
0.08 INJECTION, SOLUTION INTRAMUSCULAR; INTRAVENOUS; SUBCUTANEOUS AS NEEDED
Status: ACTIVE | OUTPATIENT
Start: 2024-12-20 | End: 2024-12-20

## 2024-12-20 RX ORDER — CAFFEINE AND SODIUM BENZOATE 125 MG/ML
INJECTION, SOLUTION INTRAMUSCULAR; INTRAVENOUS
Status: COMPLETED
Start: 2024-12-20 | End: 2024-12-20

## 2024-12-20 RX ORDER — KETAMINE HYDROCHLORIDE 50 MG/ML
INJECTION, SOLUTION INTRAMUSCULAR; INTRAVENOUS AS NEEDED
Status: DISCONTINUED | OUTPATIENT
Start: 2024-12-20 | End: 2024-12-20 | Stop reason: SURG

## 2024-12-20 RX ORDER — KETOROLAC TROMETHAMINE 30 MG/ML
INJECTION, SOLUTION INTRAMUSCULAR; INTRAVENOUS
Status: COMPLETED
Start: 2024-12-20 | End: 2024-12-20

## 2024-12-20 RX ORDER — ACETAMINOPHEN 500 MG
1000 TABLET ORAL ONCE AS NEEDED
Status: ACTIVE | OUTPATIENT
Start: 2024-12-20 | End: 2024-12-20

## 2024-12-20 RX ORDER — HYDROMORPHONE HYDROCHLORIDE 1 MG/ML
0.6 INJECTION, SOLUTION INTRAMUSCULAR; INTRAVENOUS; SUBCUTANEOUS EVERY 5 MIN PRN
Status: ACTIVE | OUTPATIENT
Start: 2024-12-20 | End: 2024-12-20

## 2024-12-20 RX ORDER — ONDANSETRON 2 MG/ML
4 INJECTION INTRAMUSCULAR; INTRAVENOUS ONCE
Status: COMPLETED | OUTPATIENT
Start: 2024-12-20 | End: 2024-12-20

## 2024-12-20 RX ORDER — HYDROMORPHONE HYDROCHLORIDE 1 MG/ML
0.2 INJECTION, SOLUTION INTRAMUSCULAR; INTRAVENOUS; SUBCUTANEOUS EVERY 5 MIN PRN
Status: ACTIVE | OUTPATIENT
Start: 2024-12-20 | End: 2024-12-20

## 2024-12-20 RX ORDER — ETOMIDATE 2 MG/ML
INJECTION INTRAVENOUS AS NEEDED
Status: DISCONTINUED | OUTPATIENT
Start: 2024-12-20 | End: 2024-12-20 | Stop reason: SURG

## 2024-12-20 RX ORDER — ONDANSETRON 2 MG/ML
INJECTION INTRAMUSCULAR; INTRAVENOUS
Status: COMPLETED
Start: 2024-12-20 | End: 2024-12-20

## 2024-12-20 RX ADMIN — ETOMIDATE 14 MG: 2 INJECTION INTRAVENOUS at 06:53:00

## 2024-12-20 RX ADMIN — LABETALOL HYDROCHLORIDE 5 MG: 5 INJECTION, SOLUTION INTRAVENOUS at 06:53:00

## 2024-12-20 RX ADMIN — ONDANSETRON 4 MG: 2 INJECTION INTRAMUSCULAR; INTRAVENOUS at 06:12:00

## 2024-12-20 RX ADMIN — LABETALOL HYDROCHLORIDE 5 MG: 5 INJECTION, SOLUTION INTRAVENOUS at 07:01:00

## 2024-12-20 RX ADMIN — CAFFEINE AND SODIUM BENZOATE 125 MG: 125 INJECTION, SOLUTION INTRAMUSCULAR; INTRAVENOUS at 06:12:00

## 2024-12-20 RX ADMIN — KETOROLAC TROMETHAMINE 15 MG: 30 INJECTION, SOLUTION INTRAMUSCULAR; INTRAVENOUS at 06:12:00

## 2024-12-20 RX ADMIN — SODIUM CHLORIDE, SODIUM LACTATE, POTASSIUM CHLORIDE, CALCIUM CHLORIDE: 600; 310; 30; 20 INJECTION, SOLUTION INTRAVENOUS at 06:11:00

## 2024-12-20 RX ADMIN — KETAMINE HYDROCHLORIDE 25 MG: 50 INJECTION, SOLUTION INTRAMUSCULAR; INTRAVENOUS at 06:53:00

## 2024-12-20 NOTE — PROGRESS NOTES
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 F33.2    Type of ECT:  Bifrontal    Place of Service:  Outpatient    Settings:   1.  Energy Percentage: 80%.  Consider increasing parameters next treatment secondary to shorter seizure    2.  Program:  Low 0.5    Pre-ECT Evaluation    Symptoms:  Patient seen.  Patient presents for ECT.  Patient with severe treatment resistant depression.  Patient noted multiple medication treatment failures.  Patient with family history of positive treatment response to ECT when there were multiple medication failures.  Patient noted continued periods of dysphoria, neurovegetative symptoms, and anxiety.  No suicidal thoughts.  Patient continues to be highly obsessional.  Patient shows capacity to make decisions.  We discussed risks and benefits.  Plan on initiating ECT.    Risk/Benefits:  Discussed with patient side effects of ECT including headache, teeth, jaw, cardiac, pulmonary, NPO, aspiration, allergic reactions, anesthetic reactions, musculoskeletal, neurologic, morbidity/mortality, potential lack of efficacy, unilateral/bilateral ECT, relapse/maintenance issues, cognitive risks including memory, concentration, cognition, and other risks.    Side Effects:  Patient notes no cognitive/physical complaints    Exam:  Mood: depressed and anxious  Affect: Congruent  Memory:  intact immediate, recent, remote and as evidenced by ability to present consistent history  Concentration:   good  Suicidal ideation: no suicidal ideation    Prior to procedure, reviewed with treatment team correct patient, time of procedure and type of ECT.  Also reviewed with anesthesia  pre-ECT medications.    Patient Monitored:  B/P, EKG, EEG, Pulse Ox, Left Ankle Cuff    Pre-ECT Medications: Toradol 15 mg IV, Zofran 4 mg IV, and caffeine 125 mg IV.  Consider increasing caffeine dose.  Patient with post-ECT hypertension and consider giving hydralazine 10 mg IV next treatment    ECT Medications:  Labetalol 5 mg IV, Anesthetic  Etomidate 14 mg IV followed by ketamine 25 mg IV, and Succinylcholine 80 mg IV    Seizure Duration:  Motor: 21 seconds       EE seconds    Post-ECT Condition: Patient with relatively short seizure and post-ECT hypertension    Post-ECT Medications:  Propofol 30 mg IV and labetalol 5 mg IV    Bryn Taylor MD

## 2024-12-20 NOTE — ANESTHESIA PREPROCEDURE EVALUATION
PRE-OP EVALUATION    Patient Name: Robe Nunez    Admit Diagnosis: Severe recurrent major depression without psychotic features (HCC) [F33.2]    Pre-op Diagnosis: * No pre-op diagnosis entered *        Anesthesia Procedure: ECT(BEDSIDE)    * No surgeons found in log *    Pre-op vitals reviewed.  Temp: 98 °F (36.7 °C)  Pulse: 74  Resp: 16  BP: 136/69  SpO2: 95 %  Body mass index is 28.51 kg/m².    Current medications reviewed.  Hospital Medications:   lactated ringers infusion   Intravenous Continuous    [COMPLETED] ketorolac (Toradol) 30 MG/ML injection 15 mg  15 mg Intravenous Once    [COMPLETED] ondansetron (Zofran) 4 MG/2ML injection 4 mg  4 mg Intravenous Once    [COMPLETED] caffeine-sodium benzoate 125-125 mg/mL injection  125 mg Intravenous Once       Outpatient Medications:   Prescriptions Prior to Admission[1]    Allergies: Other and Seasonal      Anesthesia Evaluation    Patient summary reviewed.    Anesthetic Complications  (-) history of anesthetic complications         GI/Hepatic/Renal                                 Cardiovascular        Exercise tolerance: good     MET: >4      (+) hypertension                                     Endo/Other                                  Pulmonary                           Neuro/Psych      (+) depression                                Past Surgical History:   Procedure Laterality Date    Colonoscopy & polypectomy  05/09/2016    diverticulosis and a small polyp was removed; ASC    Colonoscopy,biopsy N/A 05/09/2016    Procedure: COLONOSCOPY, POSSIBLE BIOPSY, POSSIBLE POLYPECTOMY 09238;  Surgeon: Shaun Mock MD;  [facility]    Other surgical history      scope knee    Other surgical history  11/29/2024    Aquablation of the prostate    Patient documented not to have experienced any of the following events N/A 05/09/2016    Procedure: COLONOSCOPY, POSSIBLE BIOPSY, POSSIBLE POLYPECTOMY 27434;  Surgeon: Shaun Mock MD;  Location: [facility]    Patient withough preoperative order for iv antibiotic surgical site infection prophylaxis. N/A 05/09/2016    Procedure: COLONOSCOPY, POSSIBLE BIOPSY, POSSIBLE POLYPECTOMY 94278;  Surgeon: Shaun Mock MD;  Location: [facility]    Special service or report  1990s    R knee arthroscopy lat meniscus     Social History     Socioeconomic History    Marital status:    Tobacco Use    Smoking status: Never    Smokeless tobacco: Never   Vaping Use    Vaping status: Unknown   Substance and Sexual Activity    Alcohol use: No     Alcohol/week: 0.0 - 1.0 standard drinks of alcohol     Comment: rare    Drug use: No    Sexual activity: Yes     History   Drug Use No     Available pre-op labs reviewed.  Lab Results   Component Value Date    WBC 6.5 11/17/2024    RBC 3.89 11/17/2024    HGB 11.6 (L) 11/17/2024    HCT 32.3 (L) 11/17/2024    MCV 83.0 11/17/2024    MCH 29.8 11/17/2024    MCHC 35.9 11/17/2024    RDW 13.2 11/17/2024    .0 11/17/2024     Lab Results   Component Value Date     11/17/2024    K 3.7 11/17/2024     11/17/2024    CO2 28.0 11/17/2024    BUN 16 11/17/2024    CREATSERUM 1.15 11/17/2024    GLU 88 11/17/2024    CA 9.8 11/17/2024            Airway      Mallampati: III  Mouth opening: 3 FB  TM distance: 4 - 6 cm  Neck ROM: full Cardiovascular      Rhythm: regular       Dental    Dentition appears grossly intact         Pulmonary    Pulmonary exam normal.                 Other findings              ASA: 3   Plan: general  NPO status verified and     Post-procedure pain management plan discussed with surgeon and patient.      Plan/risks discussed with: patient                Present on Admission:  **None**             [1] (Not in a hospital admission)

## 2024-12-20 NOTE — ANESTHESIA POSTPROCEDURE EVALUATION
[facility]    Robe Nunez Patient Status:  Outpatient   Age/Gender 74 year old male MRN [id number]   Location [facility] POST ANESTHESIA CARE UNIT Attending Frances Sung MD   Hosp Day # 0 PCP Mango Boland MD       Anesthesia Post-op Note        Procedure Summary       Date: 12/20/24 Room / Location: [facility] Post Anesthesia Care Unit    Anesthesia Start: 0650 Anesthesia Stop: 0703    Procedure: ECT(BEDSIDE) Diagnosis: Severe recurrent major depression without psychotic features (HCC)    Scheduled Providers:  Anesthesiologist: Grabiel Kramer MD    Anesthesia Type: general ASA Status: 3            Anesthesia Type: general      12/20/2024     7:20 AM 12/20/2024     7:25 AM   Vitals History   /78 149/83   Pulse 68 72   Resp 14 14   SpO2 98 % 96 %           Patient Location: PACU    Anesthesia Type: general    Airway Patency: patent    Postop Pain Control: adequate    Mental Status: preanesthetic baseline    Nausea/Vomiting: none    Cardiopulmonary/Hydration status: stable euvolemic    Complications: no apparent anesthesia related complications    Postop vital signs: stable    Dental Exam: Unchanged from Preop    Patient to be discharged from PACU when criteria met.

## 2024-12-20 NOTE — PROGRESS NOTES
[facility] / [facility]  ECT History & Physical    Robe Nunez Patient Status:  Outpatient   Age/Gender 74 year old male MRN [id number]   Location [facility] POST ANESTHESIA CARE UNIT Attending Frances Sung MD   Hosp Day # 0 PCP Mango Boland MD     Date: 12/20/2024    Diagnosis: Major depression recurrent severe    Procedure:  ECT    HPI:     Patient seen.  Patient reports no cognitive or physical complaints      Medical History:  Past Medical History:    Cancer (HCC)    Skin cancer    Depression    Headache disorder    High blood pressure    High cholesterol    HTN (hypertension)    Hyperlipidemia    Nonspecific elevation of levels of transaminase or lactic acid dehydrogenase (LDH)    s/p liver biopsy normal    Visual impairment    glasses       Surgical History:  Past Surgical History:   Procedure Laterality Date    Colonoscopy & polypectomy  05/09/2016    diverticulosis and a small polyp was removed; ASC    Colonoscopy,biopsy N/A 05/09/2016    Procedure: COLONOSCOPY, POSSIBLE BIOPSY, POSSIBLE POLYPECTOMY 50599;  Surgeon: Shaun Mock MD;  Location: [facility]    Other surgical history      scope knee    Other surgical history  11/29/2024    Aquablation of the prostate    Patient documented not to have experienced any of the following events N/A 05/09/2016    Procedure: COLONOSCOPY, POSSIBLE BIOPSY, POSSIBLE POLYPECTOMY 56859;  Surgeon: Shaun Mock MD;  Location: [facility]    Patient withough preoperative order for iv antibiotic surgical site infection prophylaxis. N/A 05/09/2016    Procedure: COLONOSCOPY, POSSIBLE BIOPSY, POSSIBLE POLYPECTOMY 49374;  Surgeon: Shaun Mock MD;  Location: [facility]    Special service or report  1990s    R knee arthroscopy lat meniscus       Family History:  Family History   Problem Relation Age of Onset    Hypertension Father     Diabetes Father     Lipids Brother     Cancer Brother         prostate ca     Other (Other[other]) Sister         ?sle       ROS:  Unremarkable    Physical Exam:   /83   Pulse 72   Temp 98.6 °F (37 °C) (Temporal)   Resp 14   Ht 67\"   Wt 82.6 kg (182 lb)   SpO2 96%   BMI 28.51 kg/m²     General Appearance:    Alert, cooperative, no distress, appears stated age   Head:    Normocephalic, without obvious abnormality, atraumatic   Eyes:    PERRL, conjunctiva/corneas clear, EOM's intact       Nose:   Nares normal, septum midline, mucosa normal, no drainage    or sinus tenderness   Throat:   Lips, mucosa, and tongue normal; teeth and gums normal   Neck:   Supple, symmetrical, trachea midline   Lungs:     Clear to auscultation bilaterally, respirations unlabored    Heart:    Regular rate and rhythm, S1 and S2 normal, no murmur, rub   or gallop   Abdomen:     Soft, non-tender, bowel sounds active all four quadrants,     no masses, no organomegaly   Extremities:   Extremities normal, atraumatic, no cyanosis or edema   Pulses:   2+ and symmetric all extremities   Skin:   Skin color, texture, turgor normal, no rashes or lesions   Neurologic:   CNII-XII intact, normal strength, sensation and reflexes     throughout     Impressions & Plans: Major depression recurrent severe.  Bifrontal ECT    I have discussed the risks and benefits and alternatives with the patient/family.  They understand and agree to proceed with plan of care.    Bryn Taylor MD

## 2024-12-20 NOTE — DISCHARGE INSTRUCTIONS
Discharge Instructions  Electroconvulsive Therapy    Activities:  You MUST arrange to have a responsible adult drive you home and have a responsible adult stay with you the rest of the day and overnight.  Do not drive today.  Do not operate any machinery today. Use kitchen equipment with caution.  Rest and take it easy today.  Do not take public transportation without the presence of another responsible adult for 24 hours    Medications:  Resume your regular medications when you get home  The nurse will instruct you not to take any NSAIDS (Advil, Aleve, Motrin, Ibuprofen) before 1 pm today because you were given a certain medication during the procedure.      Diet:  You may resume your regular diet when you get home  Do not drink alcohol for 24 hours    Additional Instructions:  Someone should call you to schedule any upcoming ECT treatments. Call as needed to schedule or cancel your ECT appointments [phone].  If you have any questions or concerns, please call your own psychiatrist.  For your safety, please do not wear make-up to any future ECT appointments.  For any questions regarding your ECT appointment, please call [facility] [phone].  For cancellations after hours, call [phone] and leave a message.    Expected Recovery:  As you awaken, you may experience one or more of the following:  Headache, nausea, temporary confusion, or muscle stiffness.  If these symptoms increase, become severe or are accompanied by a fever of more than 101, please seek medical attention.  The ECT may affect memory.  Many patients report loss of memory for events that occurred in the days, weeks or months surrounding the ECT.  Many of these memories may return, but not always.  Short-term memory may also be affected for months, but this can also be a result of the disorder that you have.

## 2024-12-23 ENCOUNTER — ANESTHESIA (OUTPATIENT)
Dept: POSTOP/PACU | Facility: HOSPITAL | Age: 74
End: 2024-12-23
Payer: MEDICARE

## 2024-12-23 ENCOUNTER — TELEPHONE (OUTPATIENT)
Dept: CARE COORDINATION | Age: 74
End: 2024-12-23

## 2024-12-23 ENCOUNTER — ANESTHESIA EVENT (OUTPATIENT)
Dept: POSTOP/PACU | Facility: HOSPITAL | Age: 74
End: 2024-12-23
Payer: MEDICARE

## 2024-12-23 ENCOUNTER — HOSPITAL ENCOUNTER (OUTPATIENT)
Dept: POSTOP/PACU | Facility: HOSPITAL | Age: 74
Discharge: HOME | End: 2024-12-23
Attending: Other
Payer: MEDICARE

## 2024-12-23 VITALS
OXYGEN SATURATION: 96 % | DIASTOLIC BLOOD PRESSURE: 78 MMHG | SYSTOLIC BLOOD PRESSURE: 146 MMHG | BODY MASS INDEX: 28.56 KG/M2 | WEIGHT: 182 LBS | HEIGHT: 67 IN | HEART RATE: 87 BPM | RESPIRATION RATE: 16 BRPM | TEMPERATURE: 99 F

## 2024-12-23 DIAGNOSIS — F33.2 SEVERE RECURRENT MAJOR DEPRESSION WITHOUT PSYCHOTIC FEATURES (HCC): ICD-10-CM

## 2024-12-23 RX ORDER — HYDRALAZINE HYDROCHLORIDE 20 MG/ML
10 INJECTION INTRAMUSCULAR; INTRAVENOUS ONCE
Status: COMPLETED | OUTPATIENT
Start: 2024-12-23 | End: 2024-12-23

## 2024-12-23 RX ORDER — CAFFEINE AND SODIUM BENZOATE 125 MG/ML
250 INJECTION, SOLUTION INTRAMUSCULAR; INTRAVENOUS ONCE
Status: COMPLETED | OUTPATIENT
Start: 2024-12-23 | End: 2024-12-23

## 2024-12-23 RX ORDER — KETOROLAC TROMETHAMINE 30 MG/ML
15 INJECTION, SOLUTION INTRAMUSCULAR; INTRAVENOUS ONCE
Status: COMPLETED | OUTPATIENT
Start: 2024-12-23 | End: 2024-12-23

## 2024-12-23 RX ORDER — NALOXONE HYDROCHLORIDE 0.4 MG/ML
80 INJECTION, SOLUTION INTRAMUSCULAR; INTRAVENOUS; SUBCUTANEOUS AS NEEDED
Status: ACTIVE | OUTPATIENT
Start: 2024-12-23 | End: 2024-12-23

## 2024-12-23 RX ORDER — ONDANSETRON 2 MG/ML
INJECTION INTRAMUSCULAR; INTRAVENOUS
Status: COMPLETED
Start: 2024-12-23 | End: 2024-12-23

## 2024-12-23 RX ORDER — KETAMINE HYDROCHLORIDE 50 MG/ML
INJECTION, SOLUTION INTRAMUSCULAR; INTRAVENOUS AS NEEDED
Status: DISCONTINUED | OUTPATIENT
Start: 2024-12-23 | End: 2024-12-23 | Stop reason: SURG

## 2024-12-23 RX ORDER — HYDRALAZINE HYDROCHLORIDE 20 MG/ML
INJECTION INTRAMUSCULAR; INTRAVENOUS
Status: COMPLETED
Start: 2024-12-23 | End: 2024-12-23

## 2024-12-23 RX ORDER — ONDANSETRON 2 MG/ML
4 INJECTION INTRAMUSCULAR; INTRAVENOUS ONCE
Status: COMPLETED | OUTPATIENT
Start: 2024-12-23 | End: 2024-12-23

## 2024-12-23 RX ORDER — HYDROMORPHONE HYDROCHLORIDE 1 MG/ML
0.2 INJECTION, SOLUTION INTRAMUSCULAR; INTRAVENOUS; SUBCUTANEOUS EVERY 5 MIN PRN
Status: ACTIVE | OUTPATIENT
Start: 2024-12-23 | End: 2024-12-23

## 2024-12-23 RX ORDER — METOPROLOL TARTRATE 1 MG/ML
2.5 INJECTION, SOLUTION INTRAVENOUS ONCE
Status: DISCONTINUED | OUTPATIENT
Start: 2024-12-23 | End: 2024-12-25

## 2024-12-23 RX ORDER — KETOROLAC TROMETHAMINE 30 MG/ML
INJECTION, SOLUTION INTRAMUSCULAR; INTRAVENOUS
Status: COMPLETED
Start: 2024-12-23 | End: 2024-12-23

## 2024-12-23 RX ORDER — CAFFEINE AND SODIUM BENZOATE 125 MG/ML
INJECTION, SOLUTION INTRAMUSCULAR; INTRAVENOUS
Status: COMPLETED
Start: 2024-12-23 | End: 2024-12-23

## 2024-12-23 RX ORDER — ETOMIDATE 2 MG/ML
INJECTION INTRAVENOUS AS NEEDED
Status: DISCONTINUED | OUTPATIENT
Start: 2024-12-23 | End: 2024-12-23 | Stop reason: SURG

## 2024-12-23 RX ORDER — HYDROMORPHONE HYDROCHLORIDE 1 MG/ML
0.6 INJECTION, SOLUTION INTRAMUSCULAR; INTRAVENOUS; SUBCUTANEOUS EVERY 5 MIN PRN
Status: ACTIVE | OUTPATIENT
Start: 2024-12-23 | End: 2024-12-23

## 2024-12-23 RX ORDER — SODIUM CHLORIDE, SODIUM LACTATE, POTASSIUM CHLORIDE, CALCIUM CHLORIDE 600; 310; 30; 20 MG/100ML; MG/100ML; MG/100ML; MG/100ML
INJECTION, SOLUTION INTRAVENOUS CONTINUOUS
Status: DISCONTINUED | OUTPATIENT
Start: 2024-12-23 | End: 2024-12-25

## 2024-12-23 RX ORDER — LABETALOL HYDROCHLORIDE 5 MG/ML
INJECTION, SOLUTION INTRAVENOUS AS NEEDED
Status: DISCONTINUED | OUTPATIENT
Start: 2024-12-23 | End: 2024-12-23 | Stop reason: SURG

## 2024-12-23 RX ORDER — HYDROMORPHONE HYDROCHLORIDE 1 MG/ML
0.4 INJECTION, SOLUTION INTRAMUSCULAR; INTRAVENOUS; SUBCUTANEOUS EVERY 5 MIN PRN
Status: ACTIVE | OUTPATIENT
Start: 2024-12-23 | End: 2024-12-23

## 2024-12-23 RX ADMIN — CAFFEINE AND SODIUM BENZOATE 250 MG: 125 INJECTION, SOLUTION INTRAMUSCULAR; INTRAVENOUS at 06:31:00

## 2024-12-23 RX ADMIN — KETOROLAC TROMETHAMINE 15 MG: 30 INJECTION, SOLUTION INTRAMUSCULAR; INTRAVENOUS at 06:27:00

## 2024-12-23 RX ADMIN — HYDRALAZINE HYDROCHLORIDE 10 MG: 20 INJECTION INTRAMUSCULAR; INTRAVENOUS at 06:25:00

## 2024-12-23 RX ADMIN — SODIUM CHLORIDE, SODIUM LACTATE, POTASSIUM CHLORIDE, CALCIUM CHLORIDE: 600; 310; 30; 20 INJECTION, SOLUTION INTRAVENOUS at 06:00:00

## 2024-12-23 RX ADMIN — ONDANSETRON 4 MG: 2 INJECTION INTRAMUSCULAR; INTRAVENOUS at 06:28:00

## 2024-12-23 RX ADMIN — ETOMIDATE 14 MG: 2 INJECTION INTRAVENOUS at 06:46:00

## 2024-12-23 RX ADMIN — KETAMINE HYDROCHLORIDE 25 MG: 50 INJECTION, SOLUTION INTRAMUSCULAR; INTRAVENOUS at 06:46:00

## 2024-12-23 RX ADMIN — LABETALOL HYDROCHLORIDE 5 MG: 5 INJECTION, SOLUTION INTRAVENOUS at 06:46:00

## 2024-12-23 NOTE — DISCHARGE INSTRUCTIONS
Discharge Instructions  Electroconvulsive Therapy    Activities:  You MUST arrange to have a responsible adult drive you home and have a responsible adult stay with you the rest of the day and overnight.  Do not drive today.  Do not operate any machinery today. Use kitchen equipment with caution.  Rest and take it easy today.  Do not take public transportation without the presence of another responsible adult for 24 hours    Medications:  Resume your regular medications when you get home  The nurse will instruct you not to take any NSAIDS (Advil, Aleve, Motrin, Ibuprofen) before 1 pm today because you were given a certain medication during the procedure.      Diet:  You may resume your regular diet when you get home  Do not drink alcohol for 24 hours    Additional Instructions:  Someone should call you to schedule any upcoming ECT treatments. Call as needed to schedule or cancel your ECT appointments [phone].  If you have any questions or concerns, please call your own psychiatrist.  For your safety, please do not wear make-up to any future ECT appointments.  For any questions regarding your ECT appointment, please call [facility] [phone].  For cancellations after hours, call [phone] and leave a message.    Expected Recovery:  As you awaken, you may experience one or more of the following:  Headache, nausea, temporary confusion, or muscle stiffness.  If these symptoms increase, become severe or are accompanied by a fever of more than 101, please seek medical attention.  The ECT may affect memory.  Many patients report loss of memory for events that occurred in the days, weeks or months surrounding the ECT.  Many of these memories may return, but not always.  Short-term memory may also be affected for months, but this can also be a result of the disorder that you have.

## 2024-12-23 NOTE — PROGRESS NOTES
[facility]  ECT Procedure Note    Robe Nunez Patient Status:  Outpatient   Age/Gender 74 year old male   MRN [id number]    Location [facility] POST ANESTHESIA CARE UNIT Attending No att. providers found   Hosp Day # 0 PCP Mango Boland MD     ECT Number: #2    Diagnosis: Major Depression Recurrent Severe Without Psychotic Features. F33.2    Type of ECT:  Bifrontal    Place of Service:  Outpatient    Settings:   1.  Energy Percentage: 85%    2.  Program:  Low 0.5    Pre-ECT Evaluation    Symptoms:      Prior to procedure, reviewed with treatment team correct patient, time of procedure and type of ECT.  Also reviewed with anesthesia pre-ECT medications    Pt reports mood is about the same, depressed and anxious.  No SI.  Significant neurovegetative sx including loss of energy interest motivation and enjoyment..  Sleep remains difficult.  Noted mild headache and nausea after ECT that was tolerable.  No cognitive complaints    The patient continues to retain capacity to consent for ECT.    Risk/Benefits:  Discussed with patient side effects of ECT including headache, teeth, jaw, cardiac, pulmonary, NPO, aspiration, allergic reactions, anesthetic reactions, musculoskeletal, neurologic, morbidity/mortality, potential lack of efficacy, unilateral/bilateral ECT, relapse/maintenance issues, cognitive risks including memory, concentration, cognition, and other risks.    Side Effects:  Patient notes no cognitive/physical complaints    Exam:  Mood: sad, depressed, and anxious  Affect: Congruent and Blunted  Memory:  intact immediate, recent, remote and as evidenced by ability to present consistent history  Concentration:   focused and attentive and as assessed by  ability to concentrate on our conversation  Suicidal ideation: no suicidal ideation    Patient Monitored:  B/P, EKG, EEG, Pulse Ox, Left Ankle Cuff    Pre-ECT Medications:  Toradol 15 mg IV, Zofran 4 mg IV, and caffeine 250 mg IV,  hydralazine 10 mg IV (consider increased dose of Zofran and caffeine prior to next treatment)    ECT Medications:   Labetalol 5 mg IV, Anesthetic  Etomidate 14 mg IV followed by ketamine 25 mg IV, and Succinylcholine 80 mg IV     Seizure Duration:  Motor: 24 seconds        EE seconds     Post-ECT Condition:  Treatment unremarkable    ECT Medications: Propofol 30 mg IV    Frances Sung    2024

## 2024-12-23 NOTE — ANESTHESIA POSTPROCEDURE EVALUATION
[facility]    Robe Nunez Patient Status:  Outpatient   Age/Gender 74 year old male MRN [id number]   Location [facility] POST ANESTHESIA CARE UNIT Attending Bryn Taylor MD   Hosp Day # 0 PCP Mango Boland MD       Anesthesia Post-op Note        Procedure Summary       Date: 12/23/24 Room / Location: [facility] Post Anesthesia Care Unit    Anesthesia Start: 0643 Anesthesia Stop: 0654    Procedure: ECT(BEDSIDE) Diagnosis: Severe recurrent major depression without psychotic features (HCC)    Scheduled Providers:  Anesthesiologist: Francis Carranza MD    Anesthesia Type: general ASA Status: 2            Anesthesia Type: general    Vitals Value Taken Time   /90 12/23/24 0711   Temp  12/23/24 0712   Pulse 86 12/23/24 0711   Resp 18 12/23/24 0711   SpO2 98 % 12/23/24 0710   Vitals shown include unfiled device data.    Patient Location: PACU    Anesthesia Type: general    Airway Patency: patent    Postop Pain Control: adequate    Mental Status: mildly sedated but able to meaningfully participate in the post-anesthesia evaluation    Nausea/Vomiting: none    Cardiopulmonary/Hydration status: stable euvolemic    Complications: no apparent anesthesia related complications    Postop vital signs: stable    Dental Exam: Unchanged from Preop    Patient to be discharged from PACU when criteria met.

## 2024-12-23 NOTE — PROGRESS NOTES
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  No physical complaints      Medical History:  Past Medical History:    Cancer (HCC)    Skin cancer    Depression    Headache disorder    High blood pressure    High cholesterol    HTN (hypertension)    Hyperlipidemia    Nonspecific elevation of levels of transaminase or lactic acid dehydrogenase (LDH)    s/p liver biopsy normal    Visual impairment    glasses       Surgical History:  Past Surgical History:   Procedure Laterality Date    Colonoscopy & polypectomy  05/09/2016    diverticulosis and a small polyp was removed; ASC    Colonoscopy,biopsy N/A 05/09/2016    Procedure: COLONOSCOPY, POSSIBLE BIOPSY, POSSIBLE POLYPECTOMY 64345;  Surgeon: Shaun Mock MD;  Location: [facility]    Other surgical history      scope knee    Other surgical history  11/29/2024    Aquablation of the prostate    Patient documented not to have experienced any of the following events N/A 05/09/2016    Procedure: COLONOSCOPY, POSSIBLE BIOPSY, POSSIBLE POLYPECTOMY 65932;  Surgeon: Shaun Mock MD;  Location: [facility]    Patient withough preoperative order for iv antibiotic surgical site infection prophylaxis. N/A 05/09/2016    Procedure: COLONOSCOPY, POSSIBLE BIOPSY, POSSIBLE POLYPECTOMY 73449;  Surgeon: Shaun Mock MD;  Location: [facility]    Special service or report  1990s    R knee arthroscopy lat meniscus       Family History:  Family History   Problem Relation Age of Onset    Hypertension Father     Diabetes Father     Lipids Brother     Cancer Brother          prostate ca    Other (Other[other]) Sister         ?sle       Social History:  Social History     Socioeconomic History    Marital status:      Spouse name: Not on file    Number of children: Not on file    Years of education: Not on file    Highest education level: Not on file   Occupational History    Not on file   Tobacco Use    Smoking status: Never    Smokeless tobacco: Never   Vaping Use    Vaping status: Unknown   Substance and Sexual Activity    Alcohol use: No     Alcohol/week: 0.0 - 1.0 standard drinks of alcohol     Comment: rare    Drug use: No    Sexual activity: Yes   Other Topics Concern    Not on file   Social History Narrative    marital status:     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[address]    caffeine: few cups    blood tranfusion: no    hiv exposure: no    travel: domestic    exercise: health club 2 x week    mammo:      dexa:      pap:      colonoscopy: 2006 ok    lab: 2012, 2013,2014    prostate: 2013,2014    testicles: 2013,2013    influenza: 2012,2013    bdt: yes    pneumovax: no    zoster: 2013                         Social Drivers of Health     Financial Resource Strain: Low Risk  (11/29/2024)    Received from [facility]    Financial Resource Strain     In the past year, have you or any family members you live with been unable to get any of the following when it was really needed? Check all that apply.: None   Food Insecurity: Low Risk  (11/29/2024)    Received from [facility]    Food Insecurity     Within the past 12 months, you worried that your food would run out before you got money to buy more.  : Never true     Within the past 12 months, the food you bought just didn't last and you didn't have money to get more. : Never true   Transportation Needs: At Risk (11/29/2024)    Received from [facility]    Transportation Needs     In the past 12 months, has lack of reliable transportation kept you from medical appointments, meetings,  work or from getting things needed for daily living? : No;Yes   Physical Activity: Not on file   Stress: Not on file   Social Connections: Not on file   Housing Stability: Low Risk  (11/17/2024)    Housing Stability     Housing Instability: No     Housing Instability Emergency: Not on file     Crib or Bassinette: Not on file       ROS:  unremarkable    Physical Exam:   /79 (BP Location: Left arm)   Pulse 75   Temp 97.3 °F (36.3 °C) (Temporal)   Resp 14   Ht 67\"   Wt 82.6 kg (182 lb)   SpO2 95%   BMI 28.51 kg/m²     General Appearance:    Alert, cooperative, no distress, appears stated age   Head:    Normocephalic, without obvious abnormality, atraumatic   Eyes:    PERRL, conjunctiva/corneas clear, EOM's intact   Nose:   Nares normal, septum midline, mucosa normal, no drainage    or sinus tenderness   Throat:   Lips, mucosa, and tongue normal; teeth and gums normal   Neck:   Supple, symmetrical, trachea midline   Lungs:     Clear to auscultation bilaterally, respirations unlabored    Heart:    Regular rate and rhythm, S1 and S2 normal, no murmur, rub or gallop   Abdomen:     Soft, non-tender, bowel sounds active all four quadrants,     no masses, no organomegaly   Extremities:   Extremities normal, atraumatic, no cyanosis or edema   Pulses:   2+ and symmetric all extremities   Skin:   Skin color, texture, turgor normal, no rashes or lesions   Neurologic:   CNII-XII intact, normal strength, sensation and reflexes     throughout     Impressions & Plans:    Diagnosis:  Major Depression Recurrent Severe Without Psychotic Features. F33.2    Procedure:  Bifrontal    I have discussed the risks and benefits and alternatives with the patient/family.  They understand and agree to proceed with plan of care.    Frances Sung MD  12/23/2024

## 2024-12-23 NOTE — ANESTHESIA PREPROCEDURE EVALUATION
PRE-OP EVALUATION    Patient Name: Robe Nunez    Admit Diagnosis: Severe recurrent major depression without psychotic features (HCC) [F33.2]    Pre-op Diagnosis: * No pre-op diagnosis entered *        Anesthesia Procedure: ECT(BEDSIDE)    * No surgeons found in log *    Pre-op vitals reviewed.  Temp: 97.3 °F (36.3 °C)  Pulse: 75  Resp: 14  BP: 140/79  SpO2: 95 %  Body mass index is 28.51 kg/m².    Current medications reviewed.  Hospital Medications:   lactated ringers infusion   Intravenous Continuous    [COMPLETED] ketorolac (Toradol) 30 MG/ML injection 15 mg  15 mg Intravenous Once    [COMPLETED] ondansetron (Zofran) 4 MG/2ML injection 4 mg  4 mg Intravenous Once    [COMPLETED] caffeine-sodium benzoate 125-125 mg/mL injection  250 mg Intravenous Once    [COMPLETED] hydrALAzine (Apresoline) 20 mg/mL injection 10 mg  10 mg Intravenous Once       Outpatient Medications:   Prescriptions Prior to Admission[1]    Allergies: Other and Seasonal      Anesthesia Evaluation    Patient summary reviewed.    Anesthetic Complications  (-) history of anesthetic complications         GI/Hepatic/Renal                                 Cardiovascular        Exercise tolerance: good     MET: >4      (+) hypertension                                     Endo/Other                                  Pulmonary                           Neuro/Psych      (+) depression                        Patient Active Problem List:     Vitamin D deficiency     HTN (hypertension)     Glucose intolerance (impaired glucose tolerance)     Pure hypercholesterolemia     Benign non-nodular prostatic hyperplasia with lower urinary tract symptoms     MDD (major depressive disorder), recurrent severe, 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)     Generalized anxiety disorder     Insomnia related to another mental disorder            Past Surgical History:   Procedure Laterality Date    Colonoscopy & polypectomy  05/09/2016    diverticulosis and a small polyp was removed; ASC     Colonoscopy,biopsy N/A 05/09/2016    Procedure: COLONOSCOPY, POSSIBLE BIOPSY, POSSIBLE POLYPECTOMY 20605;  Surgeon: Shaun Mock MD;  Location: [facility]    Other surgical history      scope knee    Other surgical history  11/29/2024    Aquablation of the prostate    Patient documented not to have experienced any of the following events N/A 05/09/2016    Procedure: COLONOSCOPY, POSSIBLE BIOPSY, POSSIBLE POLYPECTOMY 40200;  Surgeon: Shaun Mock MD;  Location: [facility]    Patient withough preoperative order for iv antibiotic surgical site infection prophylaxis. N/A 05/09/2016    Procedure: COLONOSCOPY, POSSIBLE BIOPSY, POSSIBLE POLYPECTOMY 88012;  Surgeon: Shaun Mock MD;  Location: [facility]    Special service or report  1990s    R knee arthroscopy lat meniscus     Social History     Socioeconomic History    Marital status:    Tobacco Use    Smoking status: Never    Smokeless tobacco: Never   Vaping Use    Vaping status: Unknown   Substance and Sexual Activity    Alcohol use: No     Alcohol/week: 0.0 - 1.0 standard drinks of alcohol     Comment: rare    Drug use: No    Sexual activity: Yes     History   Drug Use No     Available pre-op labs reviewed.  Lab Results   Component Value Date    WBC 6.5 11/17/2024    RBC 3.89 11/17/2024    HGB 11.6 (L) 11/17/2024    HCT 32.3 (L) 11/17/2024    MCV 83.0 11/17/2024    MCH 29.8 11/17/2024    MCHC 35.9 11/17/2024    RDW 13.2 11/17/2024    .0 11/17/2024     Lab Results   Component Value Date     11/17/2024    K 3.7 11/17/2024     11/17/2024    CO2 28.0 11/17/2024    BUN 16 11/17/2024    CREATSERUM 1.15 11/17/2024    GLU 88 11/17/2024    CA 9.8 11/17/2024            Airway      Mallampati: II  Mouth opening: >3 FB  TM distance: > 6 cm  Neck ROM: full Cardiovascular    Cardiovascular exam normal.  Rhythm: regular       Dental    Dentition appears grossly intact         Pulmonary    Pulmonary exam  normal.                 Other findings              ASA: 2   Plan: general  NPO status verified and     Post-procedure pain management plan discussed with surgeon and patient.      Plan/risks discussed with: patient                Present on Admission:  **None**           [1] (Not in a hospital admission)

## 2024-12-26 ENCOUNTER — ANESTHESIA EVENT (OUTPATIENT)
Dept: POSTOP/PACU | Facility: HOSPITAL | Age: 74
End: 2024-12-26
Payer: MEDICARE

## 2024-12-26 ENCOUNTER — ANESTHESIA (OUTPATIENT)
Dept: POSTOP/PACU | Facility: HOSPITAL | Age: 74
End: 2024-12-26
Payer: MEDICARE

## 2024-12-26 ENCOUNTER — HOSPITAL ENCOUNTER (OUTPATIENT)
Dept: POSTOP/PACU | Facility: HOSPITAL | Age: 74
Discharge: HOME | End: 2024-12-26
Attending: Other
Payer: MEDICARE

## 2024-12-26 VITALS
HEART RATE: 73 BPM | SYSTOLIC BLOOD PRESSURE: 150 MMHG | DIASTOLIC BLOOD PRESSURE: 88 MMHG | RESPIRATION RATE: 14 BRPM | TEMPERATURE: 98 F | OXYGEN SATURATION: 98 %

## 2024-12-26 DIAGNOSIS — F33.2 SEVERE RECURRENT MAJOR DEPRESSION WITHOUT PSYCHOTIC FEATURES (HCC): ICD-10-CM

## 2024-12-26 RX ORDER — HYDRALAZINE HYDROCHLORIDE 20 MG/ML
INJECTION INTRAMUSCULAR; INTRAVENOUS
Status: COMPLETED
Start: 2024-12-26 | End: 2024-12-26

## 2024-12-26 RX ORDER — CAFFEINE AND SODIUM BENZOATE 125 MG/ML
500 INJECTION, SOLUTION INTRAMUSCULAR; INTRAVENOUS ONCE
Status: COMPLETED | OUTPATIENT
Start: 2024-12-26 | End: 2024-12-26

## 2024-12-26 RX ORDER — NALOXONE HYDROCHLORIDE 0.4 MG/ML
0.08 INJECTION, SOLUTION INTRAMUSCULAR; INTRAVENOUS; SUBCUTANEOUS AS NEEDED
Status: ACTIVE | OUTPATIENT
Start: 2024-12-26 | End: 2024-12-26

## 2024-12-26 RX ORDER — HYDROMORPHONE HYDROCHLORIDE 1 MG/ML
0.6 INJECTION, SOLUTION INTRAMUSCULAR; INTRAVENOUS; SUBCUTANEOUS EVERY 5 MIN PRN
Status: ACTIVE | OUTPATIENT
Start: 2024-12-26 | End: 2024-12-26

## 2024-12-26 RX ORDER — HYDRALAZINE HYDROCHLORIDE 20 MG/ML
10 INJECTION INTRAMUSCULAR; INTRAVENOUS ONCE
Status: COMPLETED | OUTPATIENT
Start: 2024-12-26 | End: 2024-12-26

## 2024-12-26 RX ORDER — SODIUM CHLORIDE, SODIUM LACTATE, POTASSIUM CHLORIDE, CALCIUM CHLORIDE 600; 310; 30; 20 MG/100ML; MG/100ML; MG/100ML; MG/100ML
INJECTION, SOLUTION INTRAVENOUS CONTINUOUS
Status: DISCONTINUED | OUTPATIENT
Start: 2024-12-26 | End: 2024-12-28

## 2024-12-26 RX ORDER — ONDANSETRON 2 MG/ML
8 INJECTION INTRAMUSCULAR; INTRAVENOUS ONCE
Status: COMPLETED | OUTPATIENT
Start: 2024-12-26 | End: 2024-12-26

## 2024-12-26 RX ORDER — KETOROLAC TROMETHAMINE 30 MG/ML
INJECTION, SOLUTION INTRAMUSCULAR; INTRAVENOUS
Status: COMPLETED
Start: 2024-12-26 | End: 2024-12-26

## 2024-12-26 RX ORDER — KETAMINE HYDROCHLORIDE 50 MG/ML
INJECTION, SOLUTION INTRAMUSCULAR; INTRAVENOUS AS NEEDED
Status: DISCONTINUED | OUTPATIENT
Start: 2024-12-26 | End: 2024-12-26 | Stop reason: SURG

## 2024-12-26 RX ORDER — ETOMIDATE 2 MG/ML
INJECTION INTRAVENOUS AS NEEDED
Status: DISCONTINUED | OUTPATIENT
Start: 2024-12-26 | End: 2024-12-26 | Stop reason: SURG

## 2024-12-26 RX ORDER — HYDROMORPHONE HYDROCHLORIDE 1 MG/ML
0.2 INJECTION, SOLUTION INTRAMUSCULAR; INTRAVENOUS; SUBCUTANEOUS EVERY 5 MIN PRN
Status: ACTIVE | OUTPATIENT
Start: 2024-12-26 | End: 2024-12-26

## 2024-12-26 RX ORDER — ONDANSETRON 2 MG/ML
INJECTION INTRAMUSCULAR; INTRAVENOUS
Status: COMPLETED
Start: 2024-12-26 | End: 2024-12-26

## 2024-12-26 RX ORDER — KETOROLAC TROMETHAMINE 30 MG/ML
15 INJECTION, SOLUTION INTRAMUSCULAR; INTRAVENOUS ONCE
Status: COMPLETED | OUTPATIENT
Start: 2024-12-26 | End: 2024-12-26

## 2024-12-26 RX ORDER — LABETALOL HYDROCHLORIDE 5 MG/ML
INJECTION, SOLUTION INTRAVENOUS AS NEEDED
Status: DISCONTINUED | OUTPATIENT
Start: 2024-12-26 | End: 2024-12-26 | Stop reason: SURG

## 2024-12-26 RX ORDER — CAFFEINE AND SODIUM BENZOATE 125 MG/ML
INJECTION, SOLUTION INTRAMUSCULAR; INTRAVENOUS
Status: COMPLETED
Start: 2024-12-26 | End: 2024-12-26

## 2024-12-26 RX ORDER — HYDROMORPHONE HYDROCHLORIDE 1 MG/ML
0.4 INJECTION, SOLUTION INTRAMUSCULAR; INTRAVENOUS; SUBCUTANEOUS EVERY 5 MIN PRN
Status: ACTIVE | OUTPATIENT
Start: 2024-12-26 | End: 2024-12-26

## 2024-12-26 RX ADMIN — LABETALOL HYDROCHLORIDE 5 MG: 5 INJECTION, SOLUTION INTRAVENOUS at 07:15:00

## 2024-12-26 RX ADMIN — ONDANSETRON 8 MG: 2 INJECTION INTRAMUSCULAR; INTRAVENOUS at 06:33:00

## 2024-12-26 RX ADMIN — SODIUM CHLORIDE, SODIUM LACTATE, POTASSIUM CHLORIDE, CALCIUM CHLORIDE: 600; 310; 30; 20 INJECTION, SOLUTION INTRAVENOUS at 07:33:00

## 2024-12-26 RX ADMIN — KETAMINE HYDROCHLORIDE 25 MG: 50 INJECTION, SOLUTION INTRAMUSCULAR; INTRAVENOUS at 07:15:00

## 2024-12-26 RX ADMIN — CAFFEINE AND SODIUM BENZOATE 500 MG: 125 INJECTION, SOLUTION INTRAMUSCULAR; INTRAVENOUS at 07:03:00

## 2024-12-26 RX ADMIN — ETOMIDATE 14 MG: 2 INJECTION INTRAVENOUS at 07:15:00

## 2024-12-26 RX ADMIN — KETOROLAC TROMETHAMINE 15 MG: 30 INJECTION, SOLUTION INTRAMUSCULAR; INTRAVENOUS at 06:35:00

## 2024-12-26 RX ADMIN — SODIUM CHLORIDE, SODIUM LACTATE, POTASSIUM CHLORIDE, CALCIUM CHLORIDE: 600; 310; 30; 20 INJECTION, SOLUTION INTRAVENOUS at 06:15:00

## 2024-12-26 RX ADMIN — HYDRALAZINE HYDROCHLORIDE 10 MG: 20 INJECTION INTRAMUSCULAR; INTRAVENOUS at 07:04:00

## 2024-12-26 NOTE — PROGRESS NOTES
[facility] / [facility] / [facility]  ECT History & Physical    Robe Nunez Patient Status:  Outpatient   Age/Gender 74 year old male MRN [id number]   Location [facility] POST ANESTHESIA CARE UNIT Attending Bryn Taylor MD   Hosp Day # 0 PCP Mango Boland MD     Date of Service: 12/26/2024    Diagnosis:  Major Depression Recurrent Severe Without Psychotic Features. F33.2    Procedure:  Bifrontal    HPI: Depressed.  No physical complaints      Medical History:  Past Medical History:    Cancer (HCC)    Skin cancer    Depression    Headache disorder    High blood pressure    High cholesterol    HTN (hypertension)    Hyperlipidemia    Nonspecific elevation of levels of transaminase or lactic acid dehydrogenase (LDH)    s/p liver biopsy normal    Visual impairment    glasses       Surgical History:  Past Surgical History:   Procedure Laterality Date    Colonoscopy & polypectomy  05/09/2016    diverticulosis and a small polyp was removed; ASC    Colonoscopy,biopsy N/A 05/09/2016    Procedure: COLONOSCOPY, POSSIBLE BIOPSY, POSSIBLE POLYPECTOMY 10593;  Surgeon: Shaun Mock MD;  Location: [facility]    Other surgical history      scope knee    Other surgical history  11/29/2024    Aquablation of the prostate    Patient documented not to have experienced any of the following events N/A 05/09/2016    Procedure: COLONOSCOPY, POSSIBLE BIOPSY, POSSIBLE POLYPECTOMY 70922;  Surgeon: Shaun Mock MD;  Location: [facility]    Patient withough preoperative order for iv antibiotic surgical site infection prophylaxis. N/A 05/09/2016    Procedure: COLONOSCOPY, POSSIBLE BIOPSY, POSSIBLE POLYPECTOMY 96006;  Surgeon: Shaun Mock MD;  Location: [facility]    Special service or report  1990s    R knee arthroscopy lat meniscus       Family History:  Family History   Problem Relation Age of Onset    Hypertension Father     Diabetes Father     Lipids Brother     Cancer Brother          prostate ca    Other (Other[other]) Sister         ?sle       Social History:  Social History     Socioeconomic History    Marital status:      Spouse name: Not on file    Number of children: Not on file    Years of education: Not on file    Highest education level: Not on file   Occupational History    Not on file   Tobacco Use    Smoking status: Never    Smokeless tobacco: Never   Vaping Use    Vaping status: Unknown   Substance and Sexual Activity    Alcohol use: No     Alcohol/week: 0.0 - 1.0 standard drinks of alcohol     Comment: rare    Drug use: No    Sexual activity: Yes   Other Topics Concern    Not on file   Social History Narrative    marital status:     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[address]    caffeine: few cups    blood tranfusion: no    hiv exposure: no    travel: domestic    exercise: health club 2 x week    mammo:      dexa:      pap:      colonoscopy: 2006 ok    lab: 2012, 2013,2014    prostate: 2013,2014    testicles: 2013,2013    influenza: 2012,2013    bdt: yes    pneumovax: no    zoster: 2013                         Social Drivers of Health     Financial Resource Strain: Low Risk  (11/29/2024)    [facility]    Financial Resource Strain     In the past year, have you or any family members you live with been unable to get any of the following when it was really needed? Check all that apply.: None   Food Insecurity: Low Risk  (11/29/2024)    Received from [facility]    Food Insecurity     Within the past 12 months, you worried that your food would run out before you got money to buy more.  : Never true     Within the past 12 months, the food you bought just didn't last and you didn't have money to get more. : Never true   Transportation Needs: At Risk (11/29/2024)    [facility]    Transportation Needs     In the past 12 months, has lack of reliable transportation kept you from medical appointments, meetings, work or  from getting things needed for daily living? : No;Yes   Physical Activity: Not on file   Stress: Not on file   Social Connections: Not on file   Housing Stability: Low Risk  (11/17/2024)    Housing Stability     Housing Instability: No     Housing Instability Emergency: Not on file     Crib or Bassinette: Not on file       ROS:  unremarkable    Physical Exam:   There were no vitals taken for this visit.    General Appearance:    Alert, cooperative, no distress, appears stated age   Head:    Normocephalic, without obvious abnormality, atraumatic   Eyes:    PERRL, conjunctiva/corneas clear, EOM's intact   Nose:   Nares normal, septum midline, mucosa normal, no drainage    or sinus tenderness   Throat:   Lips, mucosa, and tongue normal; teeth and gums normal   Neck:   Supple, symmetrical, trachea midline   Lungs:     Clear to auscultation bilaterally, respirations unlabored    Heart:    Regular rate and rhythm, S1 and S2 normal, no murmur, rub or gallop   Abdomen:     Soft, non-tender, bowel sounds active all four quadrants,     no masses, no organomegaly   Extremities:   Extremities normal, atraumatic, no cyanosis or edema   Pulses:   2+ and symmetric all extremities   Skin:   Skin color, texture, turgor normal, no rashes or lesions   Neurologic:   CNII-XII intact, normal strength, sensation and reflexes     throughout     Impressions & Plans:    Diagnosis:  Major Depression Recurrent Severe Without Psychotic Features. F33.2    Procedure:  Bifrontal    I have discussed the risks and benefits and alternatives with the patient/family.  They understand and agree to proceed with plan of care.    Frances Sung MD  12/26/2024

## 2024-12-26 NOTE — ANESTHESIA PREPROCEDURE EVALUATION
PRE-OP EVALUATION    Patient Name: Robe Nunez    Admit Diagnosis: Severe recurrent major depression without psychotic features (HCC) [F33.2]    Pre-op Diagnosis: * No pre-op diagnosis entered *        Anesthesia Procedure: ECT(BEDSIDE)    * No surgeons found in log *    Pre-op vitals reviewed.  Temp: 97.8 °F (36.6 °C)  Pulse: 71  Resp: 15  BP: 131/69  SpO2: 98 %  There is no height or weight on file to calculate BMI.    Current medications reviewed.  Hospital Medications:   lactated ringers infusion   Intravenous Continuous    [COMPLETED] ketorolac (Toradol) 30 MG/ML injection 15 mg  15 mg Intravenous Once    [COMPLETED] ondansetron (Zofran) 4 MG/2ML injection 8 mg  8 mg Intravenous Once    [COMPLETED] caffeine-sodium benzoate 125-125 mg/mL injection  500 mg Intravenous Once    [COMPLETED] hydrALAzine (Apresoline) 20 mg/mL injection 10 mg  10 mg Intravenous Once       Outpatient Medications:   Prescriptions Prior to Admission[1]    Allergies: Other and Seasonal      Anesthesia Evaluation    Patient summary reviewed.    Anesthetic Complications  (-) history of anesthetic complications         GI/Hepatic/Renal                                 Cardiovascular        Exercise tolerance: good     MET: >4      (+) hypertension                                     Endo/Other                                  Pulmonary                           Neuro/Psych      (+) depression                        Patient Active Problem List:     Vitamin D deficiency     HTN (hypertension)     Glucose intolerance (impaired glucose tolerance)     Pure hypercholesterolemia     Benign non-nodular prostatic hyperplasia with lower urinary tract symptoms     MDD (major depressive disorder), recurrent severe, 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)     Generalized anxiety disorder     Insomnia related to another mental disorder            Past Surgical History:   Procedure Laterality Date    Colonoscopy & polypectomy  05/09/2016    diverticulosis and a small  polyp was removed; ASC    Colonoscopy,biopsy N/A 05/09/2016    Procedure: COLONOSCOPY, POSSIBLE BIOPSY, POSSIBLE POLYPECTOMY 73407;  Surgeon: Shaun Mock MD;  Location: [facility]    Other surgical history      scope knee    Other surgical history  11/29/2024    Aquablation of the prostate    Patient documented not to have experienced any of the following events N/A 05/09/2016    Procedure: COLONOSCOPY, POSSIBLE BIOPSY, POSSIBLE POLYPECTOMY 48099;  Surgeon: Shaun Mock MD;  Location: [facility]    Patient withough preoperative order for iv antibiotic surgical site infection prophylaxis. N/A 05/09/2016    Procedure: COLONOSCOPY, POSSIBLE BIOPSY, POSSIBLE POLYPECTOMY 85263;  Surgeon: Shaun Mock MD;  Location: [facility]    Special service or report  1990s    R knee arthroscopy lat meniscus     Social History     Socioeconomic History    Marital status:    Tobacco Use    Smoking status: Never    Smokeless tobacco: Never   Vaping Use    Vaping status: Unknown   Substance and Sexual Activity    Alcohol use: No     Alcohol/week: 0.0 - 1.0 standard drinks of alcohol     Comment: rare    Drug use: No    Sexual activity: Yes     History   Drug Use No     Available pre-op labs reviewed.  Lab Results   Component Value Date    WBC 6.5 11/17/2024    RBC 3.89 11/17/2024    HGB 11.6 (L) 11/17/2024    HCT 32.3 (L) 11/17/2024    MCV 83.0 11/17/2024    MCH 29.8 11/17/2024    MCHC 35.9 11/17/2024    RDW 13.2 11/17/2024    .0 11/17/2024     Lab Results   Component Value Date     11/17/2024    K 3.7 11/17/2024     11/17/2024    CO2 28.0 11/17/2024    BUN 16 11/17/2024    CREATSERUM 1.15 11/17/2024    GLU 88 11/17/2024    CA 9.8 11/17/2024            Airway      Mallampati: II  Mouth opening: >3 FB  TM distance: > 6 cm  Neck ROM: full Cardiovascular    Cardiovascular exam normal.  Rhythm: regular       Dental    Dentition appears grossly intact          Pulmonary    Pulmonary exam normal.                 Other findings              ASA: 2   Plan: general  NPO status verified and     Post-procedure pain management plan discussed with surgeon and patient.      Plan/risks discussed with: patient                Present on Admission:  **None**         [1] (Not in a hospital admission)

## 2024-12-26 NOTE — DISCHARGE INSTRUCTIONS
Discharge Instructions  Electroconvulsive Therapy    Activities:  You MUST arrange to have a responsible adult drive you home and have a responsible adult stay with you the rest of the day and overnight.  Do not drive today.  Do not operate any machinery today. Use kitchen equipment with caution.  Rest and take it easy today.  Do not take public transportation without the presence of another responsible adult for 24 hours    Medications:  Resume your regular medications when you get home.  Please do not take any NSAIDS (Advil, Aleve, Motrin, Ibuprofen) before 1 pm today because you were given an NSAID before the procedure.    Diet:  You may resume your regular diet when you get home  Do not drink alcohol for 24 hours    Additional Instructions:  Someone should call you to schedule any upcoming ECT treatments. Call as needed to schedule or cancel your ECT appointments [phone].  If you have any questions or concerns, please call your own psychiatrist.  For your safety, please do not wear make-up to any future ECT appointments.  For any questions regarding your ECT appointment, please call [facility] [phone].  For cancellations after hours, call [phone] and leave a message.    Expected Recovery:  As you awaken, you may experience one or more of the following:  Headache, nausea, temporary confusion, or muscle stiffness.  If these symptoms increase, become severe or are accompanied by a fever of more than 101, please seek medical attention.  The ECT may affect memory.  Many patients report loss of memory for events that occurred in the days, weeks or months surrounding the ECT.  Many of these memories may return, but not always.  Short-term memory may also be affected for months, but this can also be a result of the disorder that you have.

## 2024-12-26 NOTE — ANESTHESIA POSTPROCEDURE EVALUATION
[facility]    Robe Nunez Patient Status:  Outpatient   Age/Gender 74 year old male MRN [id number]   Location [facility] POST ANESTHESIA CARE UNIT Attending Bryn Taylor MD   Hosp Day # 0 PCP Mango Boland MD       Anesthesia Post-op Note        Procedure Summary       Date: 12/26/24 Room / Location: [facility] Post Anesthesia Care Unit    Anesthesia Start: 0711 Anesthesia Stop: 0733    Procedure: ECT(BEDSIDE) Diagnosis: Severe recurrent major depression without psychotic features (HCC)    Scheduled Providers:  Anesthesiologist: Wiliam Rocha MD    Anesthesia Type: general ASA Status: 2            Anesthesia Type: general    Vitals Value Taken Time   /98 12/26/24 0733   Temp 98.3 °F (36.8 °C) 12/26/24 0733   Pulse 68 12/26/24 0733   Resp 16 12/26/24 0733   SpO2 95 % 12/26/24 0733       Patient Location: PACU    Anesthesia Type: general    Airway Patency: patent    Postop Pain Control: adequate    Mental Status: mildly sedated but able to meaningfully participate in the post-anesthesia evaluation    Nausea/Vomiting: none    Cardiopulmonary/Hydration status: stable euvolemic    Complications: no apparent anesthesia related complications    Postop vital signs: stable    Dental Exam: Unchanged from Preop

## 2024-12-26 NOTE — PROGRESS NOTES
[facility] / [facility]  ECT Procedure Note    Robe Nunez Patient Status:  Outpatient   Age/Gender 74 year old male   MRN [id number]    Location [facility] POST ANESTHESIA CARE UNIT Attending No att. 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  Energy Percentage: 85%    2.  Program:  Low 0.5    Pre-ECT Evaluation    Symptoms:      Prior to procedure, reviewed with treatment team correct patient, time of procedure and type of ECT.  Also reviewed with anesthesia pre-ECT medications    Patient reports no noted improvement in his depression or suicidal thoughts thus far.  Patient does appear slightly brighter.  Patient reports he still is feeling quite depressed and hopeless with continued difficulty with energy, interest, motivation, sleep and appetite.  No suicidal thoughts.    The patient continues to retain capacity to consent for ECT.    Risk/Benefits:  Discussed with patient side effects of ECT including headache, teeth, jaw, cardiac, pulmonary, NPO, aspiration, allergic reactions, anesthetic reactions, musculoskeletal, neurologic, morbidity/mortality, potential lack of efficacy, unilateral/bilateral ECT, relapse/maintenance issues, cognitive risks including memory, concentration, cognition, and other risks.    Side Effects:  Patient notes no cognitive/physical complaints    Exam:  Mood: sad, depressed, and anxious  Affect: Congruent and Blunted  Memory:  intact immediate, recent, remote and as evidenced by ability to present consistent history  Concentration:   good and as assessed by  ability to concentrate on our conversation  Suicidal ideation: no suicidal ideation    Patient Monitored:  B/P, EKG, EEG, Pulse Ox, Left Ankle Cuff    Pre-ECT Medications:  Toradol 15 mg IV, Zofran 8 mg IV, and caffeine 500  mg IV, hydralazine 10 mg IV     ECT Medications:   Labetalol 5 mg IV, Anesthetic  Etomidate 14 mg IV followed by ketamine 25 mg IV, and Succinylcholine 80 mg IV     Seizure Duration:  Motor: 31 seconds       EE seconds    Post-ECT Condition:  Treatment unremarkable    ECT Medications: Propofol 30 mg IV    Frances Sung    2024

## 2024-12-27 ENCOUNTER — HOSPITAL ENCOUNTER (OUTPATIENT)
Dept: POSTOP/PACU | Facility: HOSPITAL | Age: 74
Discharge: HOME | End: 2024-12-27
Attending: Other
Payer: MEDICARE

## 2024-12-27 ENCOUNTER — ANESTHESIA (OUTPATIENT)
Dept: POSTOP/PACU | Facility: HOSPITAL | Age: 74
End: 2024-12-27
Payer: MEDICARE

## 2024-12-27 ENCOUNTER — ANESTHESIA EVENT (OUTPATIENT)
Dept: POSTOP/PACU | Facility: HOSPITAL | Age: 74
End: 2024-12-27
Payer: MEDICARE

## 2024-12-27 VITALS
RESPIRATION RATE: 12 BRPM | DIASTOLIC BLOOD PRESSURE: 66 MMHG | HEART RATE: 77 BPM | TEMPERATURE: 98 F | OXYGEN SATURATION: 96 % | SYSTOLIC BLOOD PRESSURE: 130 MMHG

## 2024-12-27 DIAGNOSIS — F33.2 SEVERE RECURRENT MAJOR DEPRESSION WITHOUT PSYCHOTIC FEATURES (HCC): ICD-10-CM

## 2024-12-27 RX ORDER — HYDROMORPHONE HYDROCHLORIDE 1 MG/ML
0.6 INJECTION, SOLUTION INTRAMUSCULAR; INTRAVENOUS; SUBCUTANEOUS EVERY 5 MIN PRN
Status: ACTIVE | OUTPATIENT
Start: 2024-12-27 | End: 2024-12-27

## 2024-12-27 RX ORDER — HYDROCODONE BITARTRATE AND ACETAMINOPHEN 5; 325 MG/1; MG/1
1 TABLET ORAL ONCE AS NEEDED
Status: ACTIVE | OUTPATIENT
Start: 2024-12-27 | End: 2024-12-27

## 2024-12-27 RX ORDER — LABETALOL HYDROCHLORIDE 5 MG/ML
5 INJECTION, SOLUTION INTRAVENOUS EVERY 5 MIN PRN
Status: ACTIVE | OUTPATIENT
Start: 2024-12-27 | End: 2024-12-27

## 2024-12-27 RX ORDER — SODIUM CHLORIDE, SODIUM LACTATE, POTASSIUM CHLORIDE, CALCIUM CHLORIDE 600; 310; 30; 20 MG/100ML; MG/100ML; MG/100ML; MG/100ML
INJECTION, SOLUTION INTRAVENOUS CONTINUOUS PRN
Status: DISCONTINUED | OUTPATIENT
Start: 2024-12-27 | End: 2024-12-27 | Stop reason: SURG

## 2024-12-27 RX ORDER — HYDRALAZINE HYDROCHLORIDE 20 MG/ML
10 INJECTION INTRAMUSCULAR; INTRAVENOUS ONCE
Status: COMPLETED | OUTPATIENT
Start: 2024-12-27 | End: 2024-12-27

## 2024-12-27 RX ORDER — ONDANSETRON 2 MG/ML
4 INJECTION INTRAMUSCULAR; INTRAVENOUS ONCE
Status: COMPLETED | OUTPATIENT
Start: 2024-12-27 | End: 2024-12-27

## 2024-12-27 RX ORDER — ETOMIDATE 2 MG/ML
INJECTION INTRAVENOUS AS NEEDED
Status: DISCONTINUED | OUTPATIENT
Start: 2024-12-27 | End: 2024-12-27 | Stop reason: SURG

## 2024-12-27 RX ORDER — SODIUM CHLORIDE, SODIUM LACTATE, POTASSIUM CHLORIDE, CALCIUM CHLORIDE 600; 310; 30; 20 MG/100ML; MG/100ML; MG/100ML; MG/100ML
INJECTION, SOLUTION INTRAVENOUS CONTINUOUS
Status: DISCONTINUED | OUTPATIENT
Start: 2024-12-27 | End: 2024-12-29

## 2024-12-27 RX ORDER — LABETALOL HYDROCHLORIDE 5 MG/ML
INJECTION, SOLUTION INTRAVENOUS AS NEEDED
Status: DISCONTINUED | OUTPATIENT
Start: 2024-12-27 | End: 2024-12-27 | Stop reason: SURG

## 2024-12-27 RX ORDER — KETOROLAC TROMETHAMINE 30 MG/ML
INJECTION, SOLUTION INTRAMUSCULAR; INTRAVENOUS
Status: COMPLETED
Start: 2024-12-27 | End: 2024-12-27

## 2024-12-27 RX ORDER — HYDROMORPHONE HYDROCHLORIDE 1 MG/ML
0.2 INJECTION, SOLUTION INTRAMUSCULAR; INTRAVENOUS; SUBCUTANEOUS EVERY 5 MIN PRN
Status: ACTIVE | OUTPATIENT
Start: 2024-12-27 | End: 2024-12-27

## 2024-12-27 RX ORDER — KETOROLAC TROMETHAMINE 30 MG/ML
15 INJECTION, SOLUTION INTRAMUSCULAR; INTRAVENOUS ONCE
Status: COMPLETED | OUTPATIENT
Start: 2024-12-27 | End: 2024-12-27

## 2024-12-27 RX ORDER — HYDROCODONE BITARTRATE AND ACETAMINOPHEN 5; 325 MG/1; MG/1
2 TABLET ORAL ONCE AS NEEDED
Status: ACTIVE | OUTPATIENT
Start: 2024-12-27 | End: 2024-12-27

## 2024-12-27 RX ORDER — HYDROMORPHONE HYDROCHLORIDE 1 MG/ML
0.4 INJECTION, SOLUTION INTRAMUSCULAR; INTRAVENOUS; SUBCUTANEOUS EVERY 5 MIN PRN
Status: ACTIVE | OUTPATIENT
Start: 2024-12-27 | End: 2024-12-27

## 2024-12-27 RX ORDER — ACETAMINOPHEN 500 MG
1000 TABLET ORAL ONCE AS NEEDED
Status: ACTIVE | OUTPATIENT
Start: 2024-12-27 | End: 2024-12-27

## 2024-12-27 RX ORDER — MIDAZOLAM HYDROCHLORIDE 1 MG/ML
1 INJECTION INTRAMUSCULAR; INTRAVENOUS EVERY 5 MIN PRN
Status: ACTIVE | OUTPATIENT
Start: 2024-12-27 | End: 2024-12-27

## 2024-12-27 RX ORDER — KETAMINE HYDROCHLORIDE 50 MG/ML
INJECTION, SOLUTION INTRAMUSCULAR; INTRAVENOUS AS NEEDED
Status: DISCONTINUED | OUTPATIENT
Start: 2024-12-27 | End: 2024-12-27 | Stop reason: SURG

## 2024-12-27 RX ORDER — HYDRALAZINE HYDROCHLORIDE 20 MG/ML
INJECTION INTRAMUSCULAR; INTRAVENOUS
Status: COMPLETED
Start: 2024-12-27 | End: 2024-12-27

## 2024-12-27 RX ORDER — CAFFEINE AND SODIUM BENZOATE 125 MG/ML
500 INJECTION, SOLUTION INTRAMUSCULAR; INTRAVENOUS ONCE
Status: COMPLETED | OUTPATIENT
Start: 2024-12-27 | End: 2024-12-27

## 2024-12-27 RX ORDER — CAFFEINE AND SODIUM BENZOATE 125 MG/ML
INJECTION, SOLUTION INTRAMUSCULAR; INTRAVENOUS
Status: COMPLETED
Start: 2024-12-27 | End: 2024-12-27

## 2024-12-27 RX ORDER — METOCLOPRAMIDE HYDROCHLORIDE 5 MG/ML
10 INJECTION INTRAMUSCULAR; INTRAVENOUS EVERY 8 HOURS PRN
Status: DISCONTINUED | OUTPATIENT
Start: 2024-12-27 | End: 2024-12-29

## 2024-12-27 RX ORDER — ONDANSETRON 2 MG/ML
4 INJECTION INTRAMUSCULAR; INTRAVENOUS EVERY 6 HOURS PRN
Status: DISCONTINUED | OUTPATIENT
Start: 2024-12-27 | End: 2024-12-29

## 2024-12-27 RX ORDER — NALOXONE HYDROCHLORIDE 0.4 MG/ML
0.08 INJECTION, SOLUTION INTRAMUSCULAR; INTRAVENOUS; SUBCUTANEOUS AS NEEDED
Status: ACTIVE | OUTPATIENT
Start: 2024-12-27 | End: 2024-12-27

## 2024-12-27 RX ORDER — ONDANSETRON 2 MG/ML
INJECTION INTRAMUSCULAR; INTRAVENOUS
Status: COMPLETED
Start: 2024-12-27 | End: 2024-12-27

## 2024-12-27 RX ADMIN — KETAMINE HYDROCHLORIDE 25 MG: 50 INJECTION, SOLUTION INTRAMUSCULAR; INTRAVENOUS at 06:50:00

## 2024-12-27 RX ADMIN — SODIUM CHLORIDE, SODIUM LACTATE, POTASSIUM CHLORIDE, CALCIUM CHLORIDE: 600; 310; 30; 20 INJECTION, SOLUTION INTRAVENOUS at 06:55:00

## 2024-12-27 RX ADMIN — ONDANSETRON 4 MG: 2 INJECTION INTRAMUSCULAR; INTRAVENOUS at 07:53:00

## 2024-12-27 RX ADMIN — SODIUM CHLORIDE, SODIUM LACTATE, POTASSIUM CHLORIDE, CALCIUM CHLORIDE: 600; 310; 30; 20 INJECTION, SOLUTION INTRAVENOUS at 05:59:00

## 2024-12-27 RX ADMIN — CAFFEINE AND SODIUM BENZOATE 500 MG: 125 INJECTION, SOLUTION INTRAMUSCULAR; INTRAVENOUS at 06:21:00

## 2024-12-27 RX ADMIN — LABETALOL HYDROCHLORIDE 5 MG: 5 INJECTION, SOLUTION INTRAVENOUS at 06:50:00

## 2024-12-27 RX ADMIN — ETOMIDATE 14 MG: 2 INJECTION INTRAVENOUS at 06:50:00

## 2024-12-27 RX ADMIN — SODIUM CHLORIDE, SODIUM LACTATE, POTASSIUM CHLORIDE, CALCIUM CHLORIDE: 600; 310; 30; 20 INJECTION, SOLUTION INTRAVENOUS at 06:46:00

## 2024-12-27 RX ADMIN — KETOROLAC TROMETHAMINE 15 MG: 30 INJECTION, SOLUTION INTRAMUSCULAR; INTRAVENOUS at 05:59:00

## 2024-12-27 RX ADMIN — ONDANSETRON 4 MG: 2 INJECTION INTRAMUSCULAR; INTRAVENOUS at 06:00:00

## 2024-12-27 RX ADMIN — HYDRALAZINE HYDROCHLORIDE 10 MG: 20 INJECTION INTRAMUSCULAR; INTRAVENOUS at 06:20:00

## 2024-12-27 NOTE — ANESTHESIA POSTPROCEDURE EVALUATION
[facility]    Robe Nunez Patient Status:  Outpatient   Age/Gender 74 year old male MRN [id number]   Location [facility] POST ANESTHESIA CARE UNIT Attending Bryn Taylor MD   Hosp Day # 0 PCP Mango Boland MD       Anesthesia Post-op Note        Procedure Summary       Date: 12/27/24 Room / Location: [facility] Post Anesthesia Care Unit    Anesthesia Start: 0646 Anesthesia Stop: 0657    Procedure: ECT(BEDSIDE) Diagnosis: Severe recurrent major depression without psychotic features (HCC)    Scheduled Providers:  Anesthesiologist: Sahil Gilmore MD    Anesthesia Type: general ASA Status: 2            Anesthesia Type: general        Patient Location: PACU    Anesthesia Type: general    Airway Patency: patent    Postop Pain Control: adequate    Mental Status: preanesthetic baseline    Nausea/Vomiting: none    Cardiopulmonary/Hydration status: stable euvolemic    Complications: no apparent anesthesia related complications    Postop vital signs: stable    Dental Exam: Unchanged from Preop    Patient to be discharged from PACU when criteria met.

## 2024-12-27 NOTE — DISCHARGE INSTRUCTIONS
Discharge Instructions  Electroconvulsive Therapy    Activities:  You MUST arrange to have a responsible adult drive you home and have a responsible adult stay with you the rest of the day and overnight.  Do not drive today.  Do not operate any machinery today. Use kitchen equipment with caution.  Rest and take it easy today.  Do not take public transportation without the presence of another responsible adult for 24 hours    Medications:  Resume your regular medications when you get home  The nurse will instruct you not to take any NSAIDS (Advil, Aleve, Motrin, Ibuprofen) before 1 pm today because you were given a certain medication during the procedure.      Diet:  You may resume your regular diet when you get home  Do not drink alcohol for 24 hours    Additional Instructions:  Someone should call you to schedule any upcoming ECT treatments. Call as needed to schedule or cancel your ECT appointments [phone].  If you have any questions or concerns, please call your own psychiatrist.  For your safety, please do not wear make-up to any future ECT appointments.  For any questions regarding your ECT appointment, please call [facility] [phone].  For cancellations after hours, call [phone] and leave a message.    Expected Recovery:  As you awaken, you may experience one or more of the following:  Headache, nausea, temporary confusion, or muscle stiffness.  If these symptoms increase, become severe or are accompanied by a fever of more than 101, please seek medical attention.  The ECT may affect memory.  Many patients report loss of memory for events that occurred in the days, weeks or months surrounding the ECT.  Many of these memories may return, but not always.  Short-term memory may also be affected for months, but this can also be a result of the disorder that you have.

## 2024-12-27 NOTE — ANESTHESIA PREPROCEDURE EVALUATION
PRE-OP EVALUATION    Patient Name: Robe Nunez    Admit Diagnosis: Severe recurrent major depression without psychotic features (HCC) [F33.2]    Pre-op Diagnosis: * No pre-op diagnosis entered *        Anesthesia Procedure: ECT(BEDSIDE)    * No surgeons found in log *    Pre-op vitals reviewed.  Temp: 98.9 °F (37.2 °C)  Pulse: 71  Resp: 16  BP: 130/73  SpO2: 96 %  There is no height or weight on file to calculate BMI.    Current medications reviewed.  Hospital Medications:   lactated ringers infusion   Intravenous Continuous    [COMPLETED] ketorolac (Toradol) 30 MG/ML injection 15 mg  15 mg Intravenous Once    [COMPLETED] ondansetron (Zofran) 4 MG/2ML injection 4 mg  4 mg Intravenous Once    [COMPLETED] caffeine-sodium benzoate 125-125 mg/mL injection  500 mg Intravenous Once    [COMPLETED] hydrALAzine (Apresoline) 20 mg/mL injection 10 mg  10 mg Intravenous Once    [COMPLETED] ondansetron (Zofran) 4 MG/2ML injection        [COMPLETED] ketorolac (Toradol) 30 MG/ML injection        [COMPLETED] caffeine-sodium benzoate 125-125 MG/ML injection        [COMPLETED] hydrALAzine (Apresoline) 20 mg/mL injection        lactated ringers infusion   Intravenous Continuous    lactated ringers infusion   Intravenous Continuous       Outpatient Medications:   Prescriptions Prior to Admission[1]    Allergies: Other and Seasonal      Anesthesia Evaluation    Patient summary reviewed.    Anesthetic Complications  (-) history of anesthetic complications         GI/Hepatic/Renal                                 Cardiovascular        Exercise tolerance: good     MET: >4      (+) hypertension                                     Endo/Other                                  Pulmonary                           Neuro/Psych      (+) depression                        Patient Active Problem List:     Vitamin D deficiency     HTN (hypertension)     Glucose intolerance (impaired glucose tolerance)     Pure hypercholesterolemia     Benign  non-nodular prostatic hyperplasia with lower urinary tract symptoms     MDD (major depressive disorder), recurrent severe, 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)     Generalized anxiety disorder     Insomnia related to another mental disorder            Past Surgical History:   Procedure Laterality Date    Colonoscopy & polypectomy  05/09/2016    diverticulosis and a small polyp was removed; ASC    Colonoscopy,biopsy N/A 05/09/2016    Procedure: COLONOSCOPY, POSSIBLE BIOPSY, POSSIBLE POLYPECTOMY 18644;  Surgeon: Shaun Mock MD;  Location: [facility]    Other surgical history      scope knee    Other surgical history  11/29/2024    Aquablation of the prostate    Patient documented not to have experienced any of the following events N/A 05/09/2016    Procedure: COLONOSCOPY, POSSIBLE BIOPSY, POSSIBLE POLYPECTOMY 31853;  Surgeon: Shaun Mock MD;  Location: [facility]    Patient withough preoperative order for iv antibiotic surgical site infection prophylaxis. N/A 05/09/2016    Procedure: COLONOSCOPY, POSSIBLE BIOPSY, POSSIBLE POLYPECTOMY 17594;  Surgeon: Shaun Mock MD;  Location: [facility]    Special service or report  1990s    R knee arthroscopy lat meniscus     Social History     Socioeconomic History    Marital status:    Tobacco Use    Smoking status: Never    Smokeless tobacco: Never   Vaping Use    Vaping status: Unknown   Substance and Sexual Activity    Alcohol use: No     Alcohol/week: 0.0 - 1.0 standard drinks of alcohol     Comment: rare    Drug use: No    Sexual activity: Yes     History   Drug Use No     Available pre-op labs reviewed.  Lab Results   Component Value Date    WBC 6.5 11/17/2024    RBC 3.89 11/17/2024    HGB 11.6 (L) 11/17/2024    HCT 32.3 (L) 11/17/2024    MCV 83.0 11/17/2024    MCH 29.8 11/17/2024    MCHC 35.9 11/17/2024    RDW 13.2 11/17/2024    .0 11/17/2024     Lab Results   Component Value Date     11/17/2024    K 3.7  11/17/2024     11/17/2024    CO2 28.0 11/17/2024    BUN 16 11/17/2024    CREATSERUM 1.15 11/17/2024    GLU 88 11/17/2024    CA 9.8 11/17/2024            Airway      Mallampati: II  Mouth opening: >3 FB  TM distance: > 6 cm  Neck ROM: full Cardiovascular    Cardiovascular exam normal.  Rhythm: regular       Dental    Dentition appears grossly intact         Pulmonary    Pulmonary exam normal.                 Other findings              ASA: 2   Plan: general  NPO status verified and     Post-procedure pain management plan discussed with surgeon and patient.      Plan/risks discussed with: patient                Present on Admission:  **None**         [1] (Not in a hospital admission)

## 2024-12-27 NOTE — PROGRESS NOTES
[facility] / [facility]  ECT Procedure Note    Robe Nunez Patient Status:  Outpatient   Age/Gender 74 year old male   MRN [id number]    Location [facility] POST ANESTHESIA CARE UNIT Attending No att. providers found   Hosp Day # 0 PCP Mango Boland MD     ECT Number: #4    Diagnosis: Major Depression Recurrent Severe Without Psychotic Features. F33.2    Type of ECT:  Bifrontal    Place of Service:  Outpatient    Settings:   1.  Energy Percentage: 85%    2.  Program:  Low 0.5    Pre-ECT Evaluation    Symptoms:      Prior to procedure, reviewed with treatment team correct patient, time of procedure and type of ECT.  Also reviewed with anesthesia pre-ECT medications    Patient smiles upon approach.  At first the patient states that he is not feeling any better.  When this writer comments on appearing to be somewhat improved, patient then says that he is \"more alert\" patient reports that sleep remains a challenge.  He has been taking Remeron and ativan (\" Ativan the night before ECT).  Patient is eating normally.  No suicidal thoughts.  Still with poor function    The patient continues to retain capacity to consent for ECT.    Risk/Benefits:  Discussed with patient side effects of ECT including headache, teeth, jaw, cardiac, pulmonary, NPO, aspiration, allergic reactions, anesthetic reactions, musculoskeletal, neurologic, morbidity/mortality, potential lack of efficacy, unilateral/bilateral ECT, relapse/maintenance issues, cognitive risks including memory, concentration, cognition, and other risks.    Side Effects:  Patient notes no cognitive/physical complaints    Exam:  Mood:  Slightly less depressed and \"more alert\"  Affect: Congruent and slightly brighter and more relaxed, smiles at times  Memory:  intact immediate, recent, remote and as evidenced by ability to present consistent history  Concentration:   focused and attentive and as assessed by  ability to concentrate on our  conversation  Suicidal ideation: no suicidal ideation    Patient Monitored:  B/P, EKG, EEG, Pulse Ox, Left Ankle Cuff    Pre-ECT Medications:  Toradol 15 mg IV, Zofran 8 mg IV, and caffeine 500 mg IV, hydralazine 10 mg IV      ECT Medications:   Labetalol 5 mg IV, Anesthetic  Etomidate 14 mg IV followed by ketamine 25 mg IV, and Succinylcholine 80 mg IV     Seizure Duration:  Motor: 38 seconds       EE seconds    Post-ECT Condition:  Treatment unremarkable    ECT Medications:  None    Frances Sung    2024

## 2024-12-29 NOTE — PROGRESS NOTES
[facility] / [facility]  ECT History & Physical    Robe Nunez Patient Status:  Outpatient   Age/Gender 74 year old male MRN [id number]   Location [facility] POST ANESTHESIA CARE UNIT Attending No att. providers found   Hosp Day # 0 PCP Mango Boland MD     Date of Service: 12/29/2024    Diagnosis:  Major Depression Recurrent Severe Without Psychotic Features. F33.2    Procedure:  Bifrontal    HPI: Slightly less depressed.  No physical complaints      Medical History:  Past Medical History:    Cancer (HCC)    Skin cancer    Depression    Headache disorder    High blood pressure    High cholesterol    HTN (hypertension)    Hyperlipidemia    Nonspecific elevation of levels of transaminase or lactic acid dehydrogenase (LDH)    s/p liver biopsy normal    Visual impairment    glasses       Surgical History:  Past Surgical History:   Procedure Laterality Date    Colonoscopy & polypectomy  05/09/2016    diverticulosis and a small polyp was removed; ASC    Colonoscopy,biopsy N/A 05/09/2016    Procedure: COLONOSCOPY, POSSIBLE BIOPSY, POSSIBLE POLYPECTOMY 12906;  Surgeon: Shaun Mock MD;  Location: [facility]    Other surgical history      scope knee    Other surgical history  11/29/2024    Aquablation of the prostate    Patient documented not to have experienced any of the following events N/A 05/09/2016    Procedure: COLONOSCOPY, POSSIBLE BIOPSY, POSSIBLE POLYPECTOMY 29059;  Surgeon: Shaun Mock MD;  Location: [facility]    Patient withough preoperative order for iv antibiotic surgical site infection prophylaxis. N/A 05/09/2016    Procedure: COLONOSCOPY, POSSIBLE BIOPSY, POSSIBLE POLYPECTOMY 11388;  Surgeon: Shaun Mock MD;  Location: [facility]    Special service or report  1990s    R knee arthroscopy lat meniscus       Family History:  Family History   Problem Relation Age of Onset    Hypertension Father     Diabetes Father     Lipids Brother     Cancer  Brother         prostate ca    Other (Other[other]) Sister         ?sle       Social History:  Social History     Socioeconomic History    Marital status:      Spouse name: Not on file    Number of children: Not on file    Years of education: Not on file    Highest education level: Not on file   Occupational History    Not on file   Tobacco Use    Smoking status: Never    Smokeless tobacco: Never   Vaping Use    Vaping status: Unknown   Substance and Sexual Activity    Alcohol use: No     Alcohol/week: 0.0 - 1.0 standard drinks of alcohol     Comment: rare    Drug use: No    Sexual activity: Yes   Other Topics Concern    Not on file   Social History Narrative    marital status:     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[address]    caffeine: few cups    blood tranfusion: no    hiv exposure: no    travel: domestic    exercise: health club 2 x week    mammo:      dexa:      pap:      colonoscopy: 2006 ok    lab: 2012, 2013,2014    prostate: 2013,2014    testicles: 2013,2013    influenza: 2012,2013    bdt: yes    pneumovax: no    zoster: 2013                         Social Drivers of Health     Financial Resource Strain: Low Risk  (11/29/2024)    Received from [facility]    Financial Resource Strain     In the past year, have you or any family members you live with been unable to get any of the following when it was really needed? Check all that apply.: None   Food Insecurity: Low Risk  (11/29/2024)    Received from [facility]    Food Insecurity     Within the past 12 months, you worried that your food would run out before you got money to buy more.  : Never true     Within the past 12 months, the food you bought just didn't last and you didn't have money to get more. : Never true   Transportation Needs: At Risk (11/29/2024)    Received from [facility]    Transportation Needs     In the past 12 months, has lack of reliable transportation kept you from medical appointments,  meetings, work or from getting things needed for daily living? : No;Yes   Physical Activity: Not on file   Stress: Not on file   Social Connections: Not on file   Housing Stability: Low Risk  (11/17/2024)    Housing Stability     Housing Instability: No     Housing Instability Emergency: Not on file     Crib or [place]: Not on file       ROS:  unremarkable    Physical Exam:   /66   Pulse 77   Temp 98 °F (36.7 °C)   Resp 12   SpO2 96%     General Appearance:    Alert, cooperative, no distress, appears stated age   Head:    Normocephalic, without obvious abnormality, atraumatic   Eyes:    PERRL, conjunctiva/corneas clear, EOM's intact   Nose:   Nares normal, septum midline, mucosa normal, no drainage    or sinus tenderness   Throat:   Lips, mucosa, and tongue normal; teeth and gums normal   Neck:   Supple, symmetrical, trachea midline   Lungs:     Clear to auscultation bilaterally, respirations unlabored    Heart:    Regular rate and rhythm, S1 and S2 normal, no murmur, rub or gallop   Abdomen:     Soft, non-tender, bowel sounds active all four quadrants,     no masses, no organomegaly   Extremities:   Extremities normal, atraumatic, no cyanosis or edema   Pulses:   2+ and symmetric all extremities   Skin:   Skin color, texture, turgor normal, no rashes or lesions   Neurologic:   CNII-XII intact, normal strength, sensation and reflexes     throughout     Impressions & Plans:    Diagnosis:  Major Depression Recurrent Severe Without Psychotic Features. F33.2    Procedure:  Bifrontal    I have discussed the risks and benefits and alternatives with the patient/family.  They understand and agree to proceed with plan of care.    Frances Sung MD  12/29/2024

## 2024-12-30 ENCOUNTER — TELEPHONE (OUTPATIENT)
Dept: CARE COORDINATION | Age: 74
End: 2024-12-30

## 2024-12-30 ENCOUNTER — ANESTHESIA EVENT (OUTPATIENT)
Dept: POSTOP/PACU | Facility: HOSPITAL | Age: 74
End: 2024-12-30
Payer: MEDICARE

## 2024-12-31 ENCOUNTER — ANESTHESIA (OUTPATIENT)
Dept: POSTOP/PACU | Facility: HOSPITAL | Age: 74
End: 2024-12-31
Payer: MEDICARE

## 2024-12-31 ENCOUNTER — HOSPITAL ENCOUNTER (OUTPATIENT)
Dept: POSTOP/PACU | Facility: HOSPITAL | Age: 74
Discharge: HOME | End: 2024-12-31
Attending: Other
Payer: MEDICARE

## 2024-12-31 VITALS
HEIGHT: 67 IN | RESPIRATION RATE: 12 BRPM | TEMPERATURE: 98 F | SYSTOLIC BLOOD PRESSURE: 138 MMHG | HEART RATE: 74 BPM | BODY MASS INDEX: 29 KG/M2 | OXYGEN SATURATION: 97 % | DIASTOLIC BLOOD PRESSURE: 100 MMHG

## 2024-12-31 DIAGNOSIS — F33.2 SEVERE RECURRENT MAJOR DEPRESSION WITHOUT PSYCHOTIC FEATURES (HCC): ICD-10-CM

## 2024-12-31 RX ORDER — HYDRALAZINE HYDROCHLORIDE 20 MG/ML
10 INJECTION INTRAMUSCULAR; INTRAVENOUS ONCE
Status: COMPLETED | OUTPATIENT
Start: 2024-12-31 | End: 2024-12-31

## 2024-12-31 RX ORDER — CAFFEINE AND SODIUM BENZOATE 125 MG/ML
INJECTION, SOLUTION INTRAMUSCULAR; INTRAVENOUS
Status: COMPLETED
Start: 2024-12-31 | End: 2024-12-31

## 2024-12-31 RX ORDER — KETAMINE HYDROCHLORIDE 50 MG/ML
INJECTION, SOLUTION INTRAMUSCULAR; INTRAVENOUS AS NEEDED
Status: DISCONTINUED | OUTPATIENT
Start: 2024-12-31 | End: 2024-12-31 | Stop reason: SURG

## 2024-12-31 RX ORDER — SODIUM CHLORIDE, SODIUM LACTATE, POTASSIUM CHLORIDE, CALCIUM CHLORIDE 600; 310; 30; 20 MG/100ML; MG/100ML; MG/100ML; MG/100ML
INJECTION, SOLUTION INTRAVENOUS CONTINUOUS
Status: DISCONTINUED | OUTPATIENT
Start: 2024-12-31 | End: 2025-01-02

## 2024-12-31 RX ORDER — HYDROMORPHONE HYDROCHLORIDE 1 MG/ML
0.4 INJECTION, SOLUTION INTRAMUSCULAR; INTRAVENOUS; SUBCUTANEOUS EVERY 5 MIN PRN
Status: ACTIVE | OUTPATIENT
Start: 2024-12-31 | End: 2024-12-31

## 2024-12-31 RX ORDER — KETOROLAC TROMETHAMINE 30 MG/ML
INJECTION, SOLUTION INTRAMUSCULAR; INTRAVENOUS
Status: COMPLETED
Start: 2024-12-31 | End: 2024-12-31

## 2024-12-31 RX ORDER — ONDANSETRON 2 MG/ML
INJECTION INTRAMUSCULAR; INTRAVENOUS
Status: COMPLETED
Start: 2024-12-31 | End: 2024-12-31

## 2024-12-31 RX ORDER — ETOMIDATE 2 MG/ML
INJECTION INTRAVENOUS AS NEEDED
Status: DISCONTINUED | OUTPATIENT
Start: 2024-12-31 | End: 2024-12-31 | Stop reason: SURG

## 2024-12-31 RX ORDER — HYDROMORPHONE HYDROCHLORIDE 1 MG/ML
0.6 INJECTION, SOLUTION INTRAMUSCULAR; INTRAVENOUS; SUBCUTANEOUS EVERY 5 MIN PRN
Status: ACTIVE | OUTPATIENT
Start: 2024-12-31 | End: 2024-12-31

## 2024-12-31 RX ORDER — HYDRALAZINE HYDROCHLORIDE 20 MG/ML
INJECTION INTRAMUSCULAR; INTRAVENOUS
Status: COMPLETED
Start: 2024-12-31 | End: 2024-12-31

## 2024-12-31 RX ORDER — METOPROLOL TARTRATE 1 MG/ML
2.5 INJECTION, SOLUTION INTRAVENOUS ONCE
Status: DISCONTINUED | OUTPATIENT
Start: 2024-12-31 | End: 2025-01-02

## 2024-12-31 RX ORDER — NALOXONE HYDROCHLORIDE 0.4 MG/ML
80 INJECTION, SOLUTION INTRAMUSCULAR; INTRAVENOUS; SUBCUTANEOUS AS NEEDED
Status: ACTIVE | OUTPATIENT
Start: 2024-12-31 | End: 2024-12-31

## 2024-12-31 RX ORDER — KETOROLAC TROMETHAMINE 30 MG/ML
15 INJECTION, SOLUTION INTRAMUSCULAR; INTRAVENOUS ONCE
Status: COMPLETED | OUTPATIENT
Start: 2024-12-31 | End: 2024-12-31

## 2024-12-31 RX ORDER — LABETALOL HYDROCHLORIDE 5 MG/ML
INJECTION, SOLUTION INTRAVENOUS AS NEEDED
Status: DISCONTINUED | OUTPATIENT
Start: 2024-12-31 | End: 2024-12-31 | Stop reason: SURG

## 2024-12-31 RX ORDER — HYDROMORPHONE HYDROCHLORIDE 1 MG/ML
0.2 INJECTION, SOLUTION INTRAMUSCULAR; INTRAVENOUS; SUBCUTANEOUS EVERY 5 MIN PRN
Status: ACTIVE | OUTPATIENT
Start: 2024-12-31 | End: 2024-12-31

## 2024-12-31 RX ORDER — CAFFEINE AND SODIUM BENZOATE 125 MG/ML
500 INJECTION, SOLUTION INTRAMUSCULAR; INTRAVENOUS ONCE
Status: COMPLETED | OUTPATIENT
Start: 2024-12-31 | End: 2024-12-31

## 2024-12-31 RX ORDER — ONDANSETRON 2 MG/ML
8 INJECTION INTRAMUSCULAR; INTRAVENOUS ONCE
Status: COMPLETED | OUTPATIENT
Start: 2024-12-31 | End: 2024-12-31

## 2024-12-31 RX ADMIN — KETOROLAC TROMETHAMINE 15 MG: 30 INJECTION, SOLUTION INTRAMUSCULAR; INTRAVENOUS at 06:21:00

## 2024-12-31 RX ADMIN — ETOMIDATE 10 MG: 2 INJECTION INTRAVENOUS at 07:13:00

## 2024-12-31 RX ADMIN — CAFFEINE AND SODIUM BENZOATE 500 MG: 125 INJECTION, SOLUTION INTRAMUSCULAR; INTRAVENOUS at 06:23:00

## 2024-12-31 RX ADMIN — HYDRALAZINE HYDROCHLORIDE 10 MG: 20 INJECTION INTRAMUSCULAR; INTRAVENOUS at 06:22:00

## 2024-12-31 RX ADMIN — ONDANSETRON 8 MG: 2 INJECTION INTRAMUSCULAR; INTRAVENOUS at 06:19:00

## 2024-12-31 RX ADMIN — KETAMINE HYDROCHLORIDE 25 MG: 50 INJECTION, SOLUTION INTRAMUSCULAR; INTRAVENOUS at 07:13:00

## 2024-12-31 RX ADMIN — LABETALOL HYDROCHLORIDE 5 MG: 5 INJECTION, SOLUTION INTRAVENOUS at 07:13:00

## 2024-12-31 RX ADMIN — SODIUM CHLORIDE, SODIUM LACTATE, POTASSIUM CHLORIDE, CALCIUM CHLORIDE: 600; 310; 30; 20 INJECTION, SOLUTION INTRAVENOUS at 06:02:00

## 2024-12-31 NOTE — ANESTHESIA POSTPROCEDURE EVALUATION
[facility]    Robe Nunez Patient Status:  Outpatient   Age/Gender 74 year old male MRN [id number]   Location [facility] POST ANESTHESIA CARE UNIT Attending Frances Sung MD   Hosp Day # 0 PCP Mango Boland MD       Anesthesia Post-op Note        Procedure Summary       Date: 12/31/24 Room / Location: [facility] Post Anesthesia Care Unit    Anesthesia Start: 0708 Anesthesia Stop: 0719    Procedure: ECT(BEDSIDE) Diagnosis: Severe recurrent major depression without psychotic features (HCC)    Scheduled Providers:  Anesthesiologist: Francis Carranza MD    Anesthesia Type: general ASA Status: 2            Anesthesia Type: No value filed.    Vitals Value Taken Time   /81 12/31/24 0942   Temp  12/31/24 0942   Pulse 78 12/31/24 0942   Resp 20 12/31/24 0942   SpO2 96 12/31/24 0942       Patient Location: PACU    Anesthesia Type: general    Airway Patency: patent    Postop Pain Control: adequate    Mental Status: mildly sedated but able to meaningfully participate in the post-anesthesia evaluation    Nausea/Vomiting: none    Cardiopulmonary/Hydration status: stable euvolemic    Complications: no apparent anesthesia related complications    Postop vital signs: stable    Dental Exam: Unchanged from Preop    Patient to be discharged from PACU when criteria met.

## 2024-12-31 NOTE — ANESTHESIA PREPROCEDURE EVALUATION
PRE-OP EVALUATION    Patient Name: Robe Nunez    Admit Diagnosis: Severe recurrent major depression without psychotic features (HCC) [F33.2]    Pre-op Diagnosis: * No pre-op diagnosis entered *        Anesthesia Procedure: ECT(BEDSIDE)    * No surgeons found in log *    Pre-op vitals reviewed.  Temp: 97.8 °F (36.6 °C)  Pulse: 76  Resp: 20  BP: 132/84  SpO2: 97 %  Body mass index is 28.51 kg/m².    Current medications reviewed.  Hospital Medications:   lactated ringers infusion   Intravenous Continuous    [COMPLETED] ketorolac (Toradol) 30 MG/ML injection 15 mg  15 mg Intravenous Once    [COMPLETED] ondansetron (Zofran) 4 MG/2ML injection 8 mg  8 mg Intravenous Once    [COMPLETED] caffeine-sodium benzoate 125-125 mg/mL injection  500 mg Intravenous Once    [COMPLETED] hydrALAzine (Apresoline) 20 mg/mL injection 10 mg  10 mg Intravenous Once       Outpatient Medications:   Prescriptions Prior to Admission[1]    Allergies: Other and Seasonal      Anesthesia Evaluation    Patient summary reviewed.    Anesthetic Complications  (-) history of anesthetic complications         GI/Hepatic/Renal                                 Cardiovascular        Exercise tolerance: good     MET: >4      (+) hypertension                                     Endo/Other                                  Pulmonary                           Neuro/Psych      (+) depression                        Patient Active Problem List:     Vitamin D deficiency     HTN (hypertension)     Glucose intolerance (impaired glucose tolerance)     Pure hypercholesterolemia     Benign non-nodular prostatic hyperplasia with lower urinary tract symptoms     MDD (major depressive disorder), recurrent severe, 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)     Generalized anxiety disorder     Insomnia related to another mental disorder            Past Surgical History:   Procedure Laterality Date    Colonoscopy & polypectomy  05/09/2016    diverticulosis and a small polyp was removed; ASC     Colonoscopy,biopsy N/A 05/09/2016    Procedure: COLONOSCOPY, POSSIBLE BIOPSY, POSSIBLE POLYPECTOMY 14744;  Surgeon: Shaun Mock MD;  Location: [facility]    Other surgical history      scope knee    Other surgical history  11/29/2024    Aquablation of the prostate    Patient documented not to have experienced any of the following events N/A 05/09/2016    Procedure: COLONOSCOPY, POSSIBLE BIOPSY, POSSIBLE POLYPECTOMY 07647;  Surgeon: Shaun Mock MD;  Location: [facility]    Patient withough preoperative order for iv antibiotic surgical site infection prophylaxis. N/A 05/09/2016    Procedure: COLONOSCOPY, POSSIBLE BIOPSY, POSSIBLE POLYPECTOMY 48629;  Surgeon: Shaun Mock MD;  Location: [facility]    Special service or report  1990s    R knee arthroscopy lat meniscus     Social History     Socioeconomic History    Marital status:    Tobacco Use    Smoking status: Never    Smokeless tobacco: Never   Vaping Use    Vaping status: Unknown   Substance and Sexual Activity    Alcohol use: No     Alcohol/week: 0.0 - 1.0 standard drinks of alcohol     Comment: rare    Drug use: No    Sexual activity: Yes     History   Drug Use No     Available pre-op labs reviewed.  Lab Results   Component Value Date    WBC 6.5 11/17/2024    RBC 3.89 11/17/2024    HGB 11.6 (L) 11/17/2024    HCT 32.3 (L) 11/17/2024    MCV 83.0 11/17/2024    MCH 29.8 11/17/2024    MCHC 35.9 11/17/2024    RDW 13.2 11/17/2024    .0 11/17/2024     Lab Results   Component Value Date     11/17/2024    K 3.7 11/17/2024     11/17/2024    CO2 28.0 11/17/2024    BUN 16 11/17/2024    CREATSERUM 1.15 11/17/2024    GLU 88 11/17/2024    CA 9.8 11/17/2024            Airway      Mallampati: II  Mouth opening: >3 FB  TM distance: > 6 cm  Neck ROM: full Cardiovascular    Cardiovascular exam normal.  Rhythm: regular       Dental    Dentition appears grossly intact         Pulmonary    Pulmonary exam  normal.                 Other findings              ASA: 2   Plan: general  NPO status verified and     Post-procedure pain management plan discussed with surgeon and patient.      Plan/risks discussed with: patient                Present on Admission:  **None**         [1] (Not in a hospital admission)

## 2024-12-31 NOTE — PROGRESS NOTES
[facility] / [facility]  ECT History & Physical    Robe Nunez Patient Status:  Outpatient   Age/Gender 74 year old male MRN [id number]   Location [facility] POST ANESTHESIA CARE UNIT Attending Frances Sung MD   Hosp Day # 0 PCP Mango Boland MD     Date of Service: 12/31/2024    Diagnosis:  Major Depression Recurrent Severe Without Psychotic Features. F33.2    Procedure: Bitemporal    HPI: Mood depressed.  No physical complaints      Medical History:  Past Medical History:    Cancer (HCC)    Skin cancer    Depression    Headache disorder    High blood pressure    High cholesterol    HTN (hypertension)    Hyperlipidemia    Nonspecific elevation of levels of transaminase or lactic acid dehydrogenase (LDH)    s/p liver biopsy normal    Visual impairment    glasses       Surgical History:  Past Surgical History:   Procedure Laterality Date    Colonoscopy & polypectomy  05/09/2016    diverticulosis and a small polyp was removed; ASC    Colonoscopy,biopsy N/A 05/09/2016    Procedure: COLONOSCOPY, POSSIBLE BIOPSY, POSSIBLE POLYPECTOMY 61414;  Surgeon: Shaun Mock MD;  Location: [facility]    Other surgical history      scope knee    Other surgical history  11/29/2024    Aquablation of the prostate    Patient documented not to have experienced any of the following events N/A 05/09/2016    Procedure: COLONOSCOPY, POSSIBLE BIOPSY, POSSIBLE POLYPECTOMY 97356;  Surgeon: Shaun Mock MD;  Location: [facility]    Patient withough preoperative order for iv antibiotic surgical site infection prophylaxis. N/A 05/09/2016    Procedure: COLONOSCOPY, POSSIBLE BIOPSY, POSSIBLE POLYPECTOMY 38761;  Surgeon: Shaun Mock MD;  Location: [facility]    Special service or report  1990s    R knee arthroscopy lat meniscus       Family History:  Family History   Problem Relation Age of Onset    Hypertension Father     Diabetes Father     Lipids Brother     Cancer Brother          prostate ca    Other (Other[other]) Sister         ?sle       Social History:  Social History     Socioeconomic History    Marital status:      Spouse name: Not on file    Number of children: Not on file    Years of education: Not on file    Highest education level: Not on file   Occupational History    Not on file   Tobacco Use    Smoking status: Never    Smokeless tobacco: Never   Vaping Use    Vaping status: Unknown   Substance and Sexual Activity    Alcohol use: No     Alcohol/week: 0.0 - 1.0 standard drinks of alcohol     Comment: rare    Drug use: No    Sexual activity: Yes   Other Topics Concern    Not on file   Social History Narrative    marital status:     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[address]    caffeine: few cups    blood tranfusion: no    hiv exposure: no    travel: domestic    exercise: health club 2 x week    mammo:      dexa:      pap:      colonoscopy: 2006 ok    lab: 2012, 2013,2014    prostate: 2013,2014    testicles: 2013,2013    influenza: 2012,2013    bdt: yes    pneumovax: no    zoster: 2013                         Social Drivers of Health     Financial Resource Strain: Low Risk  (11/29/2024)    Received from [facility]    Financial Resource Strain     In the past year, have you or any family members you live with been unable to get any of the following when it was really needed? Check all that apply.: None   Food Insecurity: Low Risk  (11/29/2024)    Received from [facility]    Food Insecurity     Within the past 12 months, you worried that your food would run out before you got money to buy more.  : Never true     Within the past 12 months, the food you bought just didn't last and you didn't have money to get more. : Never true   Transportation Needs: At Risk (11/29/2024)    Received from [facility]    Transportation Needs     In the past 12 months, has lack of reliable transportation kept you from medical appointments, meetings, work or  from getting things needed for daily living? : No;Yes   Physical Activity: Not on file   Stress: Not on file   Social Connections: Not on file   Housing Stability: Low Risk  (11/17/2024)    Housing Stability     Housing Instability: No     Housing Instability Emergency: Not on file     Crib or [place]: Not on file       ROS:  unremarkable    Physical Exam:   /84 (BP Location: Left arm)   Pulse 76   Temp 97.8 °F (36.6 °C) (Temporal)   Resp 20   Ht 67\"   SpO2 97%   BMI 28.51 kg/m²     General Appearance:    Alert, cooperative, no distress, appears stated age   Head:    Normocephalic, without obvious abnormality, atraumatic   Eyes:    PERRL, conjunctiva/corneas clear, EOM's intact   Nose:   Nares normal, septum midline, mucosa normal, no drainage    or sinus tenderness   Throat:   Lips, mucosa, and tongue normal; teeth and gums normal   Neck:   Supple, symmetrical, trachea midline   Lungs:     Clear to auscultation bilaterally, respirations unlabored    Heart:    Regular rate and rhythm, S1 and S2 normal, no murmur, rub or gallop   Abdomen:     Soft, non-tender, bowel sounds active all four quadrants,     no masses, no organomegaly   Extremities:   Extremities normal, atraumatic, no cyanosis or edema   Pulses:   2+ and symmetric all extremities   Skin:   Skin color, texture, turgor normal, no rashes or lesions   Neurologic:   CNII-XII intact, normal strength, sensation and reflexes     throughout     Impressions & Plans:    Diagnosis:  Major Depression Recurrent Severe Without Psychotic Features. F33.2    Procedure: Bitemporal    I have discussed the risks and benefits and alternatives with the patient/family.  They understand and agree to proceed with plan of care.    Frances Sung MD  12/31/2024

## 2024-12-31 NOTE — DISCHARGE INSTRUCTIONS
Discharge Instructions  Electroconvulsive Therapy    Activities:  You MUST arrange to have a responsible adult drive you home and have a responsible adult stay with you the rest of the day and overnight.  Do not drive today.  Do not operate any machinery today. Use kitchen equipment with caution.  Rest and take it easy today.  Do not take public transportation without the presence of another responsible adult for 24 hours    Medications:  Resume your regular medications when you get home  The nurse will instruct you not to take any NSAIDS (Advil, Aleve, Motrin, Ibuprofen) before 1 pm today because you were given a certain medication during the procedure.    Diet:  You may resume your regular diet when you get home  Do not drink alcohol for 24 hours    Additional Instructions:  Someone should call you to schedule any upcoming ECT treatments. Call as needed to schedule or cancel your ECT appointments [phone].  If you have any questions or concerns, please call your own psychiatrist.  For your safety, please do not wear make-up to any future ECT appointments.  For any questions regarding your ECT appointment, please call [facility] [phone].  For cancellations after hours, call [phone] and leave a message.    Expected Recovery:  As you awaken, you may experience one or more of the following:  Headache, nausea, temporary confusion, or muscle stiffness.  If these symptoms increase, become severe or are accompanied by a fever of more than 101, please seek medical attention.  The ECT may affect memory.  Many patients report loss of memory for events that occurred in the days, weeks or months surrounding the ECT.  Many of these memories may return, but not always.  Short-term memory may also be affected for months, but this can also be a result of the disorder that you have.

## 2024-12-31 NOTE — PROGRESS NOTES
[facility] / [facility]  ECT Procedure Note    Robe Nunez Patient Status:  Outpatient   Age/Gender 74 year old male   MRN [id number]    Location [facility] POST ANESTHESIA CARE UNIT Attending No att. providers found   Hosp Day # 0 PCP Mango Boland MD     ECT Number: #5 total- # 1 Bitemporal    Diagnosis: Major Depression Recurrent Severe Without Psychotic Features. F33.2    Type of ECT:  Bitemporal     Place of Service:  Outpatient    Settings:   1.  Energy Percentage: 85%    2.  Program:  Low 0.5    Pre-ECT Evaluation    Symptoms:      Prior to procedure, reviewed with treatment team correct patient, time of procedure and type of ECT.  Also reviewed with anesthesia pre-ECT medications    Patient reports that he is feeling better but appears to be minimizing.  Patient's daughters noticed a little improvement on Saturday but it was not sustained.  Patient requests to cancel treatment on Thursday or Friday this week because he feels\"so wiped out\" in the first day or 2 after treatment.  This writer recommended patient continue current schedule as it is too early to begin spacing interval without significant risk of decompensation.  This writer spoke with anesthesiologist who will decrease etomidate to 14 mg to see if this helps him feel less tired after ECT.  Additionally, this writer recommended that patient switch from bifrontal to bitemporal and we discussed the risks and benefits of increased effect on symptoms with potentially temporary increased cognitive side effects.  Patient reports minimal cognitive side effects thus far and is agreeable to this switch.    The patient continues to retain capacity to consent for ECT.    Risk/Benefits:  Discussed with patient side effects of ECT including headache, teeth, jaw, cardiac, pulmonary, NPO, aspiration, allergic reactions, anesthetic reactions, musculoskeletal, neurologic, morbidity/mortality, potential lack of efficacy, unilateral/bilateral  ECT, relapse/maintenance issues, cognitive risks including memory, concentration, cognition, and other risks.    Side Effects:  No cognitive complaints but feels \"wiped out\" after treatment    Exam:  Mood: depressed  Affect: Congruent and Restricted  Memory:  intact immediate, recent, remote and as evidenced by ability to present consistent history  Concentration:   good and as assessed by  ability to concentrate on our conversation  Suicidal ideation: no suicidal ideation    Patient Monitored:  B/P, EKG, EEG, Pulse Ox, Left Ankle Cuff    Pre-ECT Medications: Toradol 15 mg IV, Zofran 8 mg IV, and caffeine 500 mg IV, hydralazine 10 mg IV      ECT Medications:   Labetalol 5 mg IV, Anesthetic  Etomidate 10 mg IV followed by ketamine 25 mg IV, and Succinylcholine 80 mg IV        Seizure Duration:  Motor: 19 seconds       EE seconds    Post-ECT Condition:  Treatment unremarkable    ECT Medications:  None     Frances Sung    2024

## 2025-01-02 ENCOUNTER — HOSPITAL ENCOUNTER (OUTPATIENT)
Dept: POSTOP/PACU | Facility: HOSPITAL | Age: 75
Discharge: HOME | End: 2025-01-02
Attending: Other
Payer: MEDICARE

## 2025-01-02 ENCOUNTER — ANESTHESIA EVENT (OUTPATIENT)
Dept: POSTOP/PACU | Facility: HOSPITAL | Age: 75
End: 2025-01-02
Payer: MEDICARE

## 2025-01-02 ENCOUNTER — ANESTHESIA (OUTPATIENT)
Dept: POSTOP/PACU | Facility: HOSPITAL | Age: 75
End: 2025-01-02
Payer: MEDICARE

## 2025-01-02 VITALS
WEIGHT: 182 LBS | BODY MASS INDEX: 28.56 KG/M2 | HEIGHT: 67 IN | OXYGEN SATURATION: 95 % | SYSTOLIC BLOOD PRESSURE: 151 MMHG | TEMPERATURE: 99 F | HEART RATE: 76 BPM | RESPIRATION RATE: 14 BRPM | DIASTOLIC BLOOD PRESSURE: 73 MMHG

## 2025-01-02 DIAGNOSIS — F33.2 SEVERE RECURRENT MAJOR DEPRESSION WITHOUT PSYCHOTIC FEATURES (HCC): ICD-10-CM

## 2025-01-02 RX ORDER — LABETALOL HYDROCHLORIDE 5 MG/ML
INJECTION, SOLUTION INTRAVENOUS AS NEEDED
Status: DISCONTINUED | OUTPATIENT
Start: 2025-01-02 | End: 2025-01-02 | Stop reason: SURG

## 2025-01-02 RX ORDER — HYDROMORPHONE HYDROCHLORIDE 1 MG/ML
0.4 INJECTION, SOLUTION INTRAMUSCULAR; INTRAVENOUS; SUBCUTANEOUS EVERY 5 MIN PRN
Status: ACTIVE | OUTPATIENT
Start: 2025-01-02 | End: 2025-01-02

## 2025-01-02 RX ORDER — KETOROLAC TROMETHAMINE 30 MG/ML
INJECTION, SOLUTION INTRAMUSCULAR; INTRAVENOUS
Status: COMPLETED
Start: 2025-01-02 | End: 2025-01-02

## 2025-01-02 RX ORDER — SODIUM CHLORIDE, SODIUM LACTATE, POTASSIUM CHLORIDE, CALCIUM CHLORIDE 600; 310; 30; 20 MG/100ML; MG/100ML; MG/100ML; MG/100ML
INJECTION, SOLUTION INTRAVENOUS CONTINUOUS
Status: DISCONTINUED | OUTPATIENT
Start: 2025-01-02 | End: 2025-01-04

## 2025-01-02 RX ORDER — HYDRALAZINE HYDROCHLORIDE 20 MG/ML
INJECTION INTRAMUSCULAR; INTRAVENOUS
Status: COMPLETED
Start: 2025-01-02 | End: 2025-01-02

## 2025-01-02 RX ORDER — KETOROLAC TROMETHAMINE 30 MG/ML
15 INJECTION, SOLUTION INTRAMUSCULAR; INTRAVENOUS ONCE
Status: COMPLETED | OUTPATIENT
Start: 2025-01-02 | End: 2025-01-02

## 2025-01-02 RX ORDER — HYDROMORPHONE HYDROCHLORIDE 1 MG/ML
0.2 INJECTION, SOLUTION INTRAMUSCULAR; INTRAVENOUS; SUBCUTANEOUS EVERY 5 MIN PRN
Status: ACTIVE | OUTPATIENT
Start: 2025-01-02 | End: 2025-01-02

## 2025-01-02 RX ORDER — KETAMINE HYDROCHLORIDE 50 MG/ML
INJECTION, SOLUTION INTRAMUSCULAR; INTRAVENOUS AS NEEDED
Status: DISCONTINUED | OUTPATIENT
Start: 2025-01-02 | End: 2025-01-02 | Stop reason: SURG

## 2025-01-02 RX ORDER — ETOMIDATE 2 MG/ML
INJECTION INTRAVENOUS AS NEEDED
Status: DISCONTINUED | OUTPATIENT
Start: 2025-01-02 | End: 2025-01-02 | Stop reason: SURG

## 2025-01-02 RX ORDER — ONDANSETRON 2 MG/ML
8 INJECTION INTRAMUSCULAR; INTRAVENOUS ONCE
Status: COMPLETED | OUTPATIENT
Start: 2025-01-02 | End: 2025-01-02

## 2025-01-02 RX ORDER — ONDANSETRON 2 MG/ML
INJECTION INTRAMUSCULAR; INTRAVENOUS
Status: COMPLETED
Start: 2025-01-02 | End: 2025-01-02

## 2025-01-02 RX ORDER — NALOXONE HYDROCHLORIDE 0.4 MG/ML
0.08 INJECTION, SOLUTION INTRAMUSCULAR; INTRAVENOUS; SUBCUTANEOUS AS NEEDED
Status: ACTIVE | OUTPATIENT
Start: 2025-01-02 | End: 2025-01-02

## 2025-01-02 RX ORDER — HYDROMORPHONE HYDROCHLORIDE 1 MG/ML
0.6 INJECTION, SOLUTION INTRAMUSCULAR; INTRAVENOUS; SUBCUTANEOUS EVERY 5 MIN PRN
Status: ACTIVE | OUTPATIENT
Start: 2025-01-02 | End: 2025-01-02

## 2025-01-02 RX ORDER — CAFFEINE AND SODIUM BENZOATE 125 MG/ML
500 INJECTION, SOLUTION INTRAMUSCULAR; INTRAVENOUS ONCE
Status: COMPLETED | OUTPATIENT
Start: 2025-01-02 | End: 2025-01-02

## 2025-01-02 RX ORDER — HYDRALAZINE HYDROCHLORIDE 20 MG/ML
10 INJECTION INTRAMUSCULAR; INTRAVENOUS ONCE
Status: COMPLETED | OUTPATIENT
Start: 2025-01-02 | End: 2025-01-02

## 2025-01-02 RX ORDER — CAFFEINE AND SODIUM BENZOATE 125 MG/ML
INJECTION, SOLUTION INTRAMUSCULAR; INTRAVENOUS
Status: COMPLETED
Start: 2025-01-02 | End: 2025-01-02

## 2025-01-02 RX ADMIN — ETOMIDATE 14 MG: 2 INJECTION INTRAVENOUS at 07:08:00

## 2025-01-02 RX ADMIN — KETOROLAC TROMETHAMINE 15 MG: 30 INJECTION, SOLUTION INTRAMUSCULAR; INTRAVENOUS at 06:08:00

## 2025-01-02 RX ADMIN — HYDRALAZINE HYDROCHLORIDE 10 MG: 20 INJECTION INTRAMUSCULAR; INTRAVENOUS at 06:03:00

## 2025-01-02 RX ADMIN — ONDANSETRON 8 MG: 2 INJECTION INTRAMUSCULAR; INTRAVENOUS at 06:05:00

## 2025-01-02 RX ADMIN — LABETALOL HYDROCHLORIDE 5 MG: 5 INJECTION, SOLUTION INTRAVENOUS at 07:08:00

## 2025-01-02 RX ADMIN — CAFFEINE AND SODIUM BENZOATE 500 MG: 125 INJECTION, SOLUTION INTRAMUSCULAR; INTRAVENOUS at 06:53:00

## 2025-01-02 RX ADMIN — KETAMINE HYDROCHLORIDE 25 MG: 50 INJECTION, SOLUTION INTRAMUSCULAR; INTRAVENOUS at 07:08:00

## 2025-01-02 RX ADMIN — SODIUM CHLORIDE, SODIUM LACTATE, POTASSIUM CHLORIDE, CALCIUM CHLORIDE: 600; 310; 30; 20 INJECTION, SOLUTION INTRAVENOUS at 06:03:00

## 2025-01-02 NOTE — PROGRESS NOTES
[facility] / [facility]  ECT History & Physical    Robe Nunez Patient Status:  Outpatient   Age/Gender 74 year old male MRN [id number]   Location [facility] POST ANESTHESIA CARE UNIT Attending Frances Sung MD   Hosp Day # 0 PCP Mango Boland MD     Date: 1/2/2025    Diagnosis: Major depression recurrent severe    Procedure:  ECT    HPI:     Patient seen.  Patient reports no cognitive or physical complaints      Medical History:  Past Medical History:    Cancer (HCC)    Skin cancer    Depression    Headache disorder    High blood pressure    High cholesterol    HTN (hypertension)    Hyperlipidemia    Nonspecific elevation of levels of transaminase or lactic acid dehydrogenase (LDH)    s/p liver biopsy normal    Visual impairment    glasses       Surgical History:  Past Surgical History:   Procedure Laterality Date    Colonoscopy & polypectomy  05/09/2016    diverticulosis and a small polyp was removed; ASC    Colonoscopy,biopsy N/A 05/09/2016    Procedure: COLONOSCOPY, POSSIBLE BIOPSY, POSSIBLE POLYPECTOMY 67584;  Surgeon: Shaun Mock MD;  Location: [facility]    Other surgical history      scope knee    Other surgical history  11/29/2024    Aquablation of the prostate    Patient documented not to have experienced any of the following events N/A 05/09/2016    Procedure: COLONOSCOPY, POSSIBLE BIOPSY, POSSIBLE POLYPECTOMY 47008;  Surgeon: Shaun Mock MD;  Location: [facility]    Patient withough preoperative order for iv antibiotic surgical site infection prophylaxis. N/A 05/09/2016    Procedure: COLONOSCOPY, POSSIBLE BIOPSY, POSSIBLE POLYPECTOMY 13115;  Surgeon: Shaun Mock MD;  Location: [facility]    Special service or report  1990s    R knee arthroscopy lat meniscus       Family History:  Family History   Problem Relation Age of Onset    Hypertension Father     Diabetes Father     Lipids Brother     Cancer Brother         prostate ca    Other  (Other[other]) Sister         ?sle       ROS:  Unremarkable    Physical Exam:   /73   Pulse 76   Temp 99 °F (37.2 °C) (Temporal)   Resp 14   Ht 67\"   Wt 82.6 kg (182 lb)   SpO2 95%   BMI 28.51 kg/m²     General Appearance:    Alert, cooperative, no distress, appears stated age   Head:    Normocephalic, without obvious abnormality, atraumatic   Eyes:    PERRL, conjunctiva/corneas clear, EOM's intact       Nose:   Nares normal, septum midline, mucosa normal, no drainage    or sinus tenderness   Throat:   Lips, mucosa, and tongue normal; teeth and gums normal   Neck:   Supple, symmetrical, trachea midline   Lungs:     Clear to auscultation bilaterally, respirations unlabored    Heart:    Regular rate and rhythm, S1 and S2 normal, no murmur, rub   or gallop   Abdomen:     Soft, non-tender, bowel sounds active all four quadrants,     no masses, no organomegaly   Extremities:   Extremities normal, atraumatic, no cyanosis or edema   Pulses:   2+ and symmetric all extremities   Skin:   Skin color, texture, turgor normal, no rashes or lesions   Neurologic:   CNII-XII intact, normal strength, sensation and reflexes     throughout     Impressions & Plans: Major depression recurrent severe.  Bifrontal ECT    I have discussed the risks and benefits and alternatives with the patient/family.  They understand and agree to proceed with plan of care.    Bryn Taylor MD

## 2025-01-02 NOTE — ANESTHESIA POSTPROCEDURE EVALUATION
[facility]    Robe Nunez Patient Status:  Outpatient   Age/Gender 74 year old male MRN [id number]   Location [facility] POST ANESTHESIA CARE UNIT Attending Frances Sung MD   Hosp Day # 0 PCP Mango Boland MD       Anesthesia Post-op Note        Procedure Summary       Date: 01/02/25 Room / Location: [facility] Post Anesthesia Care Unit    Anesthesia Start: 0706 Anesthesia Stop: 0718    Procedure: ECT(BEDSIDE) Diagnosis: Severe recurrent major depression without psychotic features (HCC)    Scheduled Providers:  Anesthesiologist: Joseph Medrano DO    Anesthesia Type: general ASA Status: 2            Anesthesia Type: general    Vitals Value Taken Time   /80 01/02/25 0718   Temp 97 01/02/25 0718   Pulse 81 01/02/25 0718   Resp 16 01/02/25 0718   SpO2 100 01/02/25 0718       Patient Location: PACU    Anesthesia Type: general    Airway Patency: patent    Postop Pain Control: adequate    Mental Status: mildly sedated but able to meaningfully participate in the post-anesthesia evaluation    Nausea/Vomiting: none    Cardiopulmonary/Hydration status: stable euvolemic    Complications: no apparent anesthesia related complications    Postop vital signs: stable    Dental Exam: Unchanged from Preop    Patient to be discharged from PACU when criteria met.

## 2025-01-02 NOTE — PROGRESS NOTES
[facility] / [facility]  ECT Procedure Note    Robe Nunez Patient Status:  Outpatient   Age/Gender 74 year old male MRN [id number]   Location [facility] POST ANESTHESIA CARE UNIT Attending Frances Sung MD   Hosp Day # 0 PCP Mango Boland MD     1/2/2025    ECT Number: #6 total.  #2 bitemporal    Diagnosis: Major Depression Recurrent Severe Without Psychotic Features. F33.2    Type of ECT:  Bitemporal    Place of Service:  Outpatient    Settings:   1.  Energy Percentage: 90%    2.  Program:  Low 0.5    Pre-ECT Evaluation    Symptoms:  Patient seen.  Patient notes that he has been having some positive improvements in mood especially since change to bitemporal ECT.  Patient noted to have less anxiety and obsessiveness.  Patient noted to have no suicidal thoughts.  No pato or psychosis.  Patient reports no cognitive or physical complaints.  Patient shows capacity make decisions.  Discussed treatment options.  Plan on ECT tomorrow and if doing well to decrease the rate of ECT.    Risk/Benefits:  Discussed with patient side effects of ECT including headache, teeth, jaw, cardiac, pulmonary, NPO, aspiration, allergic reactions, anesthetic reactions, musculoskeletal, neurologic, morbidity/mortality, potential lack of efficacy, unilateral/bilateral ECT, relapse/maintenance issues, cognitive risks including memory, concentration, cognition, and other risks.    Side Effects:  Patient notes no cognitive/physical complaints    Exam:  Mood: less depressed and less anxious  Affect: Congruent  Memory:  intact immediate, recent, remote and as evidenced by ability to present consistent history  Concentration:   good  Suicidal ideation: no suicidal ideation    Prior to procedure, reviewed with treatment team correct patient, time of procedure and type of ECT.  Also reviewed with anesthesia pre-ECT medications.    Patient Monitored:  B/P, EKG, EEG, Pulse Ox, Left Ankle Cuff    Pre-ECT Medications: Toradol 15 mg  IV, Hydralazine 10 mg IV 30 min prior to treatment, Caffeine 500 mg IV, and Zofran 8 mg IV    ECT Medications:  Labetalol 5 mg IV, Anesthetic  Etomidate 14 mg IV followed by ketamine 25 mg IV, and Succinylcholine 80 mg IV    Seizure Duration:  Motor: 52 seconds       EE seconds    Post-ECT Condition:  Treatment unremarkable    Post-ECT Medications:  None     Bryn Taylor MD

## 2025-01-02 NOTE — DISCHARGE INSTRUCTIONS
Discharge Instructions  Electroconvulsive Therapy    Activities:  You MUST arrange to have a responsible adult drive you home and have a responsible adult stay with you the rest of the day and overnight.  Do not drive today.  Do not operate any machinery today. Use kitchen equipment with caution.  Rest and take it easy today.  Do not take public transportation without the presence of another responsible adult for 24 hours    Medications:  Resume your regular medications when you get home  The nurse will instruct you not to take any NSAIDS (Advil, Aleve, Motrin, Ibuprofen) before 1 pm today because you were given a certain medication during the procedure.      Diet:  You may resume your regular diet when you get home  Do not drink alcohol for 24 hours    Additional Instructions:  Someone should call you to schedule any upcoming ECT treatments. Call as needed to schedule or cancel your ECT appointments [phone].  If you have any questions or concerns, please call your own psychiatrist.  For your safety, please do not wear make-up to any future ECT appointments.  For any questions regarding your ECT appointment, please call [facility] [phone].  For cancellations after hours, call [phone] and leave a message.    Expected Recovery:  As you awaken, you may experience one or more of the following:  Headache, nausea, temporary confusion, or muscle stiffness.  If these symptoms increase, become severe or are accompanied by a fever of more than 101, please seek medical attention.  The ECT may affect memory.  Many patients report loss of memory for events that occurred in the days, weeks or months surrounding the ECT.  Many of these memories may return, but not always.  Short-term memory may also be affected for months, but this can also be a result of the disorder that you have.

## 2025-01-02 NOTE — ANESTHESIA PREPROCEDURE EVALUATION
PRE-OP EVALUATION    Patient Name: Robe Nunez    Admit Diagnosis: Severe recurrent major depression without psychotic features (HCC) [F33.2]    Pre-op Diagnosis: * No pre-op diagnosis entered *        Anesthesia Procedure: ECT(BEDSIDE)    * No surgeons found in log *    Pre-op vitals reviewed.  Temp: 97.8 °F (36.6 °C)  Pulse: 72  Resp: 16  BP: 133/72  SpO2: 97 %  Body mass index is 28.51 kg/m².    Current medications reviewed.  Hospital Medications:   lactated ringers infusion   Intravenous Continuous    [COMPLETED] ketorolac (Toradol) 30 MG/ML injection 15 mg  15 mg Intravenous Once    [COMPLETED] ondansetron (Zofran) 4 MG/2ML injection 8 mg  8 mg Intravenous Once    caffeine-sodium benzoate 125-125 mg/mL injection  500 mg Intravenous Once    [COMPLETED] hydrALAzine (Apresoline) 20 mg/mL injection 10 mg  10 mg Intravenous Once    [COMPLETED] ondansetron (Zofran) 4 MG/2ML injection        [COMPLETED] ketorolac (Toradol) 30 MG/ML injection        [COMPLETED] caffeine-sodium benzoate 125-125 MG/ML injection        [COMPLETED] hydrALAzine (Apresoline) 20 mg/mL injection           Outpatient Medications:   Prescriptions Prior to Admission[1]    Allergies: Other and Seasonal      Anesthesia Evaluation    Patient summary reviewed.    Anesthetic Complications           GI/Hepatic/Renal    Negative GI/hepatic/renal ROS.                             Cardiovascular            MET: >4      (+) hypertension                                     Endo/Other    Negative endo/other ROS.                              Pulmonary    Negative pulmonary ROS.                       Neuro/Psych      (+) depression                                Past Surgical History:   Procedure Laterality Date    Colonoscopy & polypectomy  05/09/2016    diverticulosis and a small polyp was removed; ASC    Colonoscopy,biopsy N/A 05/09/2016    Procedure: COLONOSCOPY, POSSIBLE BIOPSY, POSSIBLE POLYPECTOMY 37210;  Surgeon: Shaun Mock MD;  Location: [facility]    Other surgical history      scope knee    Other surgical history  11/29/2024    Aquablation of the prostate    Patient documented not to have experienced any of the following events N/A 05/09/2016    Procedure: COLONOSCOPY, POSSIBLE BIOPSY, POSSIBLE POLYPECTOMY 98100;  Surgeon: Shaun Mock MD;  Location: [facility]    Patient withough preoperative order for iv antibiotic surgical site infection prophylaxis. N/A 05/09/2016    Procedure: COLONOSCOPY, POSSIBLE BIOPSY, POSSIBLE POLYPECTOMY 81827;  Surgeon: Shaun Mcok MD;  Location: [facility]    Special service or report  1990s    R knee arthroscopy lat meniscus     Social History     Socioeconomic History    Marital status:    Tobacco Use    Smoking status: Never    Smokeless tobacco: Never   Vaping Use    Vaping status: Unknown   Substance and Sexual Activity    Alcohol use: No     Alcohol/week: 0.0 - 1.0 standard drinks of alcohol     Comment: rare    Drug use: No    Sexual activity: Yes     History   Drug Use No     Available pre-op labs reviewed.  Lab Results   Component Value Date    WBC 6.5 11/17/2024    RBC 3.89 11/17/2024    HGB 11.6 (L) 11/17/2024    HCT 32.3 (L) 11/17/2024    MCV 83.0 11/17/2024    MCH 29.8 11/17/2024    MCHC 35.9 11/17/2024    RDW 13.2 11/17/2024    .0 11/17/2024     Lab Results   Component Value Date     11/17/2024    K 3.7 11/17/2024     11/17/2024    CO2 28.0 11/17/2024    BUN 16 11/17/2024    CREATSERUM 1.15 11/17/2024    GLU 88 11/17/2024    CA 9.8 11/17/2024            Airway      Mallampati: II  Mouth opening: >3 FB  TM distance: > 6 cm  Neck ROM: full Cardiovascular    Cardiovascular exam normal.         Dental             Pulmonary    Pulmonary exam normal.                 Other findings              ASA: 2   Plan: general  NPO status verified and     Post-procedure pain management plan discussed with surgeon and patient.    Comment:    I  explained intrinsic risks of general anesthesia, including nausea, dental damage, sore throat, mouth injury,and hoarseness from airway management.  All questions were answered and understanding was demonstrated of risks.  Informed permission was obtained to proceed as documented in the signed consent form.      Plan/risks discussed with: patient      Consented to blood products.          Present on Admission:  **None**             [1] (Not in a hospital admission)

## 2025-01-03 ENCOUNTER — ANESTHESIA (OUTPATIENT)
Dept: POSTOP/PACU | Facility: HOSPITAL | Age: 75
End: 2025-01-03
Payer: MEDICARE

## 2025-01-03 ENCOUNTER — HOSPITAL ENCOUNTER (OUTPATIENT)
Dept: POSTOP/PACU | Facility: HOSPITAL | Age: 75
Discharge: HOME | End: 2025-01-03
Attending: Other
Payer: MEDICARE

## 2025-01-03 ENCOUNTER — ANESTHESIA EVENT (OUTPATIENT)
Dept: POSTOP/PACU | Facility: HOSPITAL | Age: 75
End: 2025-01-03
Payer: MEDICARE

## 2025-01-03 VITALS
SYSTOLIC BLOOD PRESSURE: 151 MMHG | HEART RATE: 89 BPM | TEMPERATURE: 98 F | DIASTOLIC BLOOD PRESSURE: 88 MMHG | RESPIRATION RATE: 14 BRPM | OXYGEN SATURATION: 97 %

## 2025-01-03 DIAGNOSIS — F33.2 SEVERE RECURRENT MAJOR DEPRESSION WITHOUT PSYCHOTIC FEATURES (HCC): ICD-10-CM

## 2025-01-03 RX ORDER — LABETALOL HYDROCHLORIDE 5 MG/ML
INJECTION, SOLUTION INTRAVENOUS AS NEEDED
Status: DISCONTINUED | OUTPATIENT
Start: 2025-01-03 | End: 2025-01-03 | Stop reason: SURG

## 2025-01-03 RX ORDER — CAFFEINE AND SODIUM BENZOATE 125 MG/ML
500 INJECTION, SOLUTION INTRAMUSCULAR; INTRAVENOUS ONCE
Status: COMPLETED | OUTPATIENT
Start: 2025-01-03 | End: 2025-01-03

## 2025-01-03 RX ORDER — HYDROMORPHONE HYDROCHLORIDE 1 MG/ML
0.6 INJECTION, SOLUTION INTRAMUSCULAR; INTRAVENOUS; SUBCUTANEOUS EVERY 5 MIN PRN
Status: CANCELLED | OUTPATIENT
Start: 2025-01-03 | End: 2025-01-03

## 2025-01-03 RX ORDER — NALOXONE HYDROCHLORIDE 0.4 MG/ML
0.08 INJECTION, SOLUTION INTRAMUSCULAR; INTRAVENOUS; SUBCUTANEOUS AS NEEDED
Status: CANCELLED | OUTPATIENT
Start: 2025-01-03 | End: 2025-01-03

## 2025-01-03 RX ORDER — SODIUM CHLORIDE, SODIUM LACTATE, POTASSIUM CHLORIDE, CALCIUM CHLORIDE 600; 310; 30; 20 MG/100ML; MG/100ML; MG/100ML; MG/100ML
INJECTION, SOLUTION INTRAVENOUS CONTINUOUS
Status: CANCELLED | OUTPATIENT
Start: 2025-01-03

## 2025-01-03 RX ORDER — ONDANSETRON 2 MG/ML
8 INJECTION INTRAMUSCULAR; INTRAVENOUS ONCE
Status: COMPLETED | OUTPATIENT
Start: 2025-01-03 | End: 2025-01-03

## 2025-01-03 RX ORDER — HYDRALAZINE HYDROCHLORIDE 20 MG/ML
INJECTION INTRAMUSCULAR; INTRAVENOUS
Status: COMPLETED
Start: 2025-01-03 | End: 2025-01-03

## 2025-01-03 RX ORDER — KETOROLAC TROMETHAMINE 30 MG/ML
15 INJECTION, SOLUTION INTRAMUSCULAR; INTRAVENOUS ONCE
Status: COMPLETED | OUTPATIENT
Start: 2025-01-03 | End: 2025-01-03

## 2025-01-03 RX ORDER — KETOROLAC TROMETHAMINE 30 MG/ML
INJECTION, SOLUTION INTRAMUSCULAR; INTRAVENOUS
Status: COMPLETED
Start: 2025-01-03 | End: 2025-01-03

## 2025-01-03 RX ORDER — HYDROMORPHONE HYDROCHLORIDE 1 MG/ML
0.4 INJECTION, SOLUTION INTRAMUSCULAR; INTRAVENOUS; SUBCUTANEOUS EVERY 5 MIN PRN
Status: CANCELLED | OUTPATIENT
Start: 2025-01-03 | End: 2025-01-03

## 2025-01-03 RX ORDER — HYDROMORPHONE HYDROCHLORIDE 1 MG/ML
0.2 INJECTION, SOLUTION INTRAMUSCULAR; INTRAVENOUS; SUBCUTANEOUS EVERY 5 MIN PRN
Status: CANCELLED | OUTPATIENT
Start: 2025-01-03 | End: 2025-01-03

## 2025-01-03 RX ORDER — ETOMIDATE 2 MG/ML
INJECTION INTRAVENOUS AS NEEDED
Status: DISCONTINUED | OUTPATIENT
Start: 2025-01-03 | End: 2025-01-03 | Stop reason: SURG

## 2025-01-03 RX ORDER — HYDRALAZINE HYDROCHLORIDE 20 MG/ML
10 INJECTION INTRAMUSCULAR; INTRAVENOUS ONCE
Status: COMPLETED | OUTPATIENT
Start: 2025-01-03 | End: 2025-01-03

## 2025-01-03 RX ORDER — SODIUM CHLORIDE, SODIUM LACTATE, POTASSIUM CHLORIDE, CALCIUM CHLORIDE 600; 310; 30; 20 MG/100ML; MG/100ML; MG/100ML; MG/100ML
INJECTION, SOLUTION INTRAVENOUS CONTINUOUS
Status: DISCONTINUED | OUTPATIENT
Start: 2025-01-03 | End: 2025-01-05

## 2025-01-03 RX ORDER — CAFFEINE AND SODIUM BENZOATE 125 MG/ML
INJECTION, SOLUTION INTRAMUSCULAR; INTRAVENOUS
Status: COMPLETED
Start: 2025-01-03 | End: 2025-01-03

## 2025-01-03 RX ORDER — ONDANSETRON 2 MG/ML
INJECTION INTRAMUSCULAR; INTRAVENOUS
Status: COMPLETED
Start: 2025-01-03 | End: 2025-01-03

## 2025-01-03 RX ORDER — KETAMINE HYDROCHLORIDE 50 MG/ML
INJECTION, SOLUTION INTRAMUSCULAR; INTRAVENOUS AS NEEDED
Status: DISCONTINUED | OUTPATIENT
Start: 2025-01-03 | End: 2025-01-03 | Stop reason: SURG

## 2025-01-03 RX ADMIN — KETOROLAC TROMETHAMINE 15 MG: 30 INJECTION, SOLUTION INTRAMUSCULAR; INTRAVENOUS at 06:34:00

## 2025-01-03 RX ADMIN — KETAMINE HYDROCHLORIDE 25 MG: 50 INJECTION, SOLUTION INTRAMUSCULAR; INTRAVENOUS at 06:59:00

## 2025-01-03 RX ADMIN — LABETALOL HYDROCHLORIDE 5 MG: 5 INJECTION, SOLUTION INTRAVENOUS at 06:59:00

## 2025-01-03 RX ADMIN — ETOMIDATE 10 MG: 2 INJECTION INTRAVENOUS at 06:59:00

## 2025-01-03 RX ADMIN — SODIUM CHLORIDE, SODIUM LACTATE, POTASSIUM CHLORIDE, CALCIUM CHLORIDE: 600; 310; 30; 20 INJECTION, SOLUTION INTRAVENOUS at 07:07:00

## 2025-01-03 RX ADMIN — ONDANSETRON 8 MG: 2 INJECTION INTRAMUSCULAR; INTRAVENOUS at 06:30:00

## 2025-01-03 RX ADMIN — CAFFEINE AND SODIUM BENZOATE 500 MG: 125 INJECTION, SOLUTION INTRAMUSCULAR; INTRAVENOUS at 06:45:00

## 2025-01-03 RX ADMIN — HYDRALAZINE HYDROCHLORIDE 10 MG: 20 INJECTION INTRAMUSCULAR; INTRAVENOUS at 06:40:00

## 2025-01-03 RX ADMIN — SODIUM CHLORIDE, SODIUM LACTATE, POTASSIUM CHLORIDE, CALCIUM CHLORIDE: 600; 310; 30; 20 INJECTION, SOLUTION INTRAVENOUS at 06:32:00

## 2025-01-03 NOTE — ANESTHESIA POSTPROCEDURE EVALUATION
[facility]    Robe Nunez Patient Status:  Outpatient   Age/Gender 74 year old male MRN [id number]   Location [facility] POST ANESTHESIA CARE UNIT Attending Frances Sung MD   Hosp Day # 0 PCP Mango Boland MD       Anesthesia Post-op Note        Procedure Summary       Date: 01/02/25 Room / Location: [facility] Post Anesthesia Care Unit    Anesthesia Start: 0706 Anesthesia Stop: 0718    Procedure: ECT(BEDSIDE) Diagnosis: Severe recurrent major depression without psychotic features (HCC)    Scheduled Providers:  Anesthesiologist: Joseph Medrano DO    Anesthesia Type: general ASA Status: 2            Anesthesia Type: general    Vitals Value Taken Time   /90 01/03/25 0736   Temp 98 01/03/25 0736   Pulse 78 01/03/25 0736   Resp 16 01/03/25 0736   SpO2 98 01/03/25 0736       Patient Location: PACU    Anesthesia Type: general    Airway Patency: patent    Postop Pain Control: adequate    Mental Status: mildly sedated but able to meaningfully participate in the post-anesthesia evaluation    Nausea/Vomiting: none    Cardiopulmonary/Hydration status: stable euvolemic    Complications: no apparent anesthesia related complications    Postop vital signs: stable    Dental Exam: Unchanged from Preop

## 2025-01-03 NOTE — ANESTHESIA POSTPROCEDURE EVALUATION
[facility]    Robe Nunez Patient Status:  Outpatient   Age/Gender 74 year old male MRN [id number]   Location [facility] POST ANESTHESIA CARE UNIT Attending Frances Sung MD   Hosp Day # 0 PCP Mnago Boland MD       Anesthesia Post-op Note        Procedure Summary       Date: 01/02/25 Room / Location: [facility] Post Anesthesia Care Unit    Anesthesia Start: 0706 Anesthesia Stop: 0718    Procedure: ECT(BEDSIDE) Diagnosis: Severe recurrent major depression without psychotic features (HCC)    Scheduled Providers:  Anesthesiologist: Joseph Medrano DO    Anesthesia Type: general ASA Status: 2            Anesthesia Type: general    Vitals Value Taken Time   /91 01/03/25 0730   Temp 98 01/03/25 0735   Pulse 76 01/03/25 0730   Resp 11 01/03/25 0730   SpO2 99 % 01/03/25 0730       Patient Location: PACU

## 2025-01-03 NOTE — ANESTHESIA POSTPROCEDURE EVALUATION
[facility]    Robe Nunez Patient Status:  Outpatient   Age/Gender 74 year old male MRN [id number]   Location [facility] POST ANESTHESIA CARE UNIT Attending Frances Sung MD   Hosp Day # 0 PCP Mango Boland MD       Anesthesia Post-op Note        Procedure Summary       Date: 01/03/25 Room / Location: [facility] Post Anesthesia Care Unit    Anesthesia Start: 0656 Anesthesia Stop: 0707    Procedure: ECT(BEDSIDE) Diagnosis: Severe recurrent major depression without psychotic features (HCC)    Scheduled Providers:  Anesthesiologist: Wiliam Rocha MD    Anesthesia Type: general ASA Status: 2            Anesthesia Type: general    Vitals Value Taken Time   /85 01/03/25 0707   Temp 98.3 °F (36.8 °C) 01/03/25 0707   Pulse 98 01/03/25 0707   Resp 16 01/03/25 0707   SpO2 95 % 01/03/25 0707       Patient Location: PACU    Anesthesia Type: general    Airway Patency: patent    Postop Pain Control: adequate    Mental Status: mildly sedated but able to meaningfully participate in the post-anesthesia evaluation    Nausea/Vomiting: none    Cardiopulmonary/Hydration status: stable euvolemic    Complications: no apparent anesthesia related complications    Postop vital signs: stable    Dental Exam: Unchanged from Preop

## 2025-01-03 NOTE — ANESTHESIA PREPROCEDURE EVALUATION
PRE-OP EVALUATION    Patient Name: Robe Nunez    Admit Diagnosis: Severe recurrent major depression without psychotic features (HCC) [F33.2]    Pre-op Diagnosis: * No pre-op diagnosis entered *        Anesthesia Procedure: ECT(BEDSIDE)    * No surgeons found in log *    Pre-op vitals reviewed.  Temp: 98 °F (36.7 °C)  Pulse: 72  Resp: 17  BP: 133/73  SpO2: 97 %  There is no height or weight on file to calculate BMI.    Current medications reviewed.  Hospital Medications:   lactated ringers infusion   Intravenous Continuous    [COMPLETED] ketorolac (Toradol) 30 MG/ML injection 15 mg  15 mg Intravenous Once    [COMPLETED] ondansetron (Zofran) 4 MG/2ML injection 8 mg  8 mg Intravenous Once    [COMPLETED] caffeine-sodium benzoate 125-125 mg/mL injection  500 mg Intravenous Once    [COMPLETED] hydrALAzine (Apresoline) 20 mg/mL injection 10 mg  10 mg Intravenous Once    [COMPLETED] ondansetron (Zofran) 4 MG/2ML injection        [COMPLETED] ketorolac (Toradol) 30 MG/ML injection        [COMPLETED] caffeine-sodium benzoate 125-125 MG/ML injection        [COMPLETED] hydrALAzine (Apresoline) 20 mg/mL injection        lactated ringers infusion   Intravenous Continuous    lactated ringers infusion   Intravenous Continuous       Outpatient Medications:   Prescriptions Prior to Admission[1]    Allergies: Other and Seasonal      Anesthesia Evaluation    Patient summary reviewed.    Anesthetic Complications           GI/Hepatic/Renal    Negative GI/hepatic/renal ROS.                             Cardiovascular            MET: >4      (+) hypertension                                     Endo/Other    Negative endo/other ROS.                              Pulmonary    Negative pulmonary ROS.                       Neuro/Psych      (+) depression                                Past Surgical History:   Procedure Laterality Date    Colonoscopy & polypectomy  05/09/2016    diverticulosis and a small polyp was removed; ASC     Colonoscopy,biopsy N/A 05/09/2016    Procedure: COLONOSCOPY, POSSIBLE BIOPSY, POSSIBLE POLYPECTOMY 08202;  Surgeon: Shaun Mock MD;  Location: [facility]    Other surgical history      scope knee    Other surgical history  11/29/2024    Aquablation of the prostate    Patient documented not to have experienced any of the following events N/A 05/09/2016    Procedure: COLONOSCOPY, POSSIBLE BIOPSY, POSSIBLE POLYPECTOMY 45861;  Surgeon: Shaun Mock MD;  Location: [facility]    Patient withough preoperative order for iv antibiotic surgical site infection prophylaxis. N/A 05/09/2016    Procedure: COLONOSCOPY, POSSIBLE BIOPSY, POSSIBLE POLYPECTOMY 12183;  Surgeon: Shaun Mock MD;  Location: [facility]    Special service or report  1990s    R knee arthroscopy lat meniscus     Social History     Socioeconomic History    Marital status:    Tobacco Use    Smoking status: Never    Smokeless tobacco: Never   Vaping Use    Vaping status: Unknown   Substance and Sexual Activity    Alcohol use: No     Alcohol/week: 0.0 - 1.0 standard drinks of alcohol     Comment: rare    Drug use: No    Sexual activity: Yes     History   Drug Use No     Available pre-op labs reviewed.  Lab Results   Component Value Date    WBC 6.5 11/17/2024    RBC 3.89 11/17/2024    HGB 11.6 (L) 11/17/2024    HCT 32.3 (L) 11/17/2024    MCV 83.0 11/17/2024    MCH 29.8 11/17/2024    MCHC 35.9 11/17/2024    RDW 13.2 11/17/2024    .0 11/17/2024     Lab Results   Component Value Date     11/17/2024    K 3.7 11/17/2024     11/17/2024    CO2 28.0 11/17/2024    BUN 16 11/17/2024    CREATSERUM 1.15 11/17/2024    GLU 88 11/17/2024    CA 9.8 11/17/2024            Airway      Mallampati: II  Mouth opening: >3 FB  TM distance: > 6 cm  Neck ROM: full Cardiovascular    Cardiovascular exam normal.         Dental             Pulmonary    Pulmonary exam normal.                 Other findings               ASA: 2   Plan: general  NPO status verified and     Post-procedure pain management plan discussed with surgeon and patient.    Comment:    I explained intrinsic risks of general anesthesia, including nausea, dental damage, sore throat, mouth injury,and hoarseness from airway management.  All questions were answered and understanding was demonstrated of risks.  Informed permission was obtained to proceed as documented in the signed consent form.      Plan/risks discussed with: patient      Consented to blood products.          Present on Admission:  **None**           [1] (Not in a hospital admission)

## 2025-01-03 NOTE — PROGRESS NOTES
[facility] / [facility]  ECT History & Physical    Robe Nunez Patient Status:  Outpatient   Age/Gender 74 year old male MRN [id number]   Location [facility] POST ANESTHESIA CARE UNIT Attending Frances Sung MD   Hosp Day # 0 PCP Mango Boland MD     Date: 1/3/2025    Diagnosis: Major depression recurrent severe    Procedure:  ECT    HPI:     Patient seen.  Patient reports no cognitive or physical complaints      Medical History:  Past Medical History:    Cancer (HCC)    Skin cancer    Depression    Headache disorder    High blood pressure    High cholesterol    HTN (hypertension)    Hyperlipidemia    Nonspecific elevation of levels of transaminase or lactic acid dehydrogenase (LDH)    s/p liver biopsy normal    Visual impairment    glasses       Surgical History:  Past Surgical History:   Procedure Laterality Date    Colonoscopy & polypectomy  05/09/2016    diverticulosis and a small polyp was removed; ASC    Colonoscopy,biopsy N/A 05/09/2016    Procedure: COLONOSCOPY, POSSIBLE BIOPSY, POSSIBLE POLYPECTOMY 68775;  Surgeon: Shaun Mock MD;  Location: [facility]    Other surgical history      scope knee    Other surgical history  11/29/2024    Aquablation of the prostate    Patient documented not to have experienced any of the following events N/A 05/09/2016    Procedure: COLONOSCOPY, POSSIBLE BIOPSY, POSSIBLE POLYPECTOMY 01861;  Surgeon: Shaun Mock MD;  Location: [facility]    Patient withough preoperative order for iv antibiotic surgical site infection prophylaxis. N/A 05/09/2016    Procedure: COLONOSCOPY, POSSIBLE BIOPSY, POSSIBLE POLYPECTOMY 16612;  Surgeon: Shaun Mock MD;  Location: [facility]    Special service or report  1990s    R knee arthroscopy lat meniscus       Family History:  Family History   Problem Relation Age of Onset    Hypertension Father     Diabetes Father     Lipids Brother     Cancer Brother         prostate ca    Other  (Other[other]) Sister         ?sle       ROS:  Unremarkable    Physical Exam:   /88   Pulse 89   Temp 98 °F (36.7 °C)   Resp 14   SpO2 97%     General Appearance:    Alert, cooperative, no distress, appears stated age   Head:    Normocephalic, without obvious abnormality, atraumatic   Eyes:    PERRL, conjunctiva/corneas clear, EOM's intact       Nose:   Nares normal, septum midline, mucosa normal, no drainage    or sinus tenderness   Throat:   Lips, mucosa, and tongue normal; teeth and gums normal   Neck:   Supple, symmetrical, trachea midline   Lungs:     Clear to auscultation bilaterally, respirations unlabored    Heart:    Regular rate and rhythm, S1 and S2 normal, no murmur, rub   or gallop   Abdomen:     Soft, non-tender, bowel sounds active all four quadrants,     no masses, no organomegaly   Extremities:   Extremities normal, atraumatic, no cyanosis or edema   Pulses:   2+ and symmetric all extremities   Skin:   Skin color, texture, turgor normal, no rashes or lesions   Neurologic:   CNII-XII intact, normal strength, sensation and reflexes     throughout     Impressions & Plans: Major depression recurrent severe.  Bitemporal ECT    I have discussed the risks and benefits and alternatives with the patient/family.  They understand and agree to proceed with plan of care.    Bryn Taylor MD

## 2025-01-03 NOTE — PROGRESS NOTES
[facility] / [facility]  ECT Procedure Note    Robe Nunez Patient Status:  Outpatient   Age/Gender 74 year old male MRN [id number]   Location [facility] POST ANESTHESIA CARE UNIT Attending Frances Sung MD   Hosp Day # 0 PCP Mango Boland MD     1/3/2025    ECT Number: #7 total.  #3 bitemporal    Diagnosis: Major Depression Recurrent Severe Without Psychotic Features. F33.2    Type of ECT:  Bitemporal    Place of Service:  Outpatient    Settings:   1.  Energy Percentage: 90%.  Patient with short seizure, consider increasing parameters    2.  Program:  Low 0.5    Pre-ECT Evaluation    Symptoms:  Patient seen.  Overall patient reports general improving mood.  Decreasing neurovegetative symptoms and anxiety.  Decreasing obsessiveness.  No suicidal thoughts.  Patient noted to have no pato or psychosis.  No cognitive or physical complaints.  Discussed pros and cons of longer term maintenance ECT.  Patient shows capacity make decisions.  Plan on decreasing to 2 ECT next week and if doing well, then weekly.    Risk/Benefits:  Discussed with patient side effects of ECT including headache, teeth, jaw, cardiac, pulmonary, NPO, aspiration, allergic reactions, anesthetic reactions, musculoskeletal, neurologic, morbidity/mortality, potential lack of efficacy, unilateral/bilateral ECT, relapse/maintenance issues, cognitive risks including memory, concentration, cognition, and other risks.    Side Effects:  Patient notes no cognitive/physical complaints    Exam:  Mood: less depressed and less anxious  Affect: Congruent  Memory:  intact immediate, recent, remote and as evidenced by ability to present consistent history  Concentration:   good  Suicidal ideation: no suicidal ideation    Prior to procedure, reviewed with treatment team correct patient, time of procedure and type of ECT.  Also reviewed with anesthesia pre-ECT medications.    Patient Monitored:  B/P, EKG, EEG, Pulse Ox, Left Ankle  Cuff    Pre-ECT Medications: Toradol 15 mg IV, Hydralazine 10 mg IV 30 min prior to treatment, Caffeine 500 mg IV, and Zofran 8 mg IV.  Patient was short seizure time and consider increasing caffeine    ECT Medications:  Labetalol 5 mg IV, Anesthetic  Etomidate 14 mg IV followed by ketamine 25 mg IV, and Succinylcholine 80 mg IV    Seizure Duration:  Motor: 19 seconds       EE seconds    Post-ECT Condition: Patient with short seizure time.  Otherwise unremarkable    Post-ECT Medications:  None     Bryn Taylor MD

## 2025-01-08 ENCOUNTER — ANESTHESIA EVENT (OUTPATIENT)
Dept: POSTOP/PACU | Facility: HOSPITAL | Age: 75
End: 2025-01-08
Payer: MEDICARE

## 2025-01-08 ENCOUNTER — HOSPITAL ENCOUNTER (OUTPATIENT)
Dept: POSTOP/PACU | Facility: HOSPITAL | Age: 75
Discharge: HOME | End: 2025-01-08
Attending: Other
Payer: MEDICARE

## 2025-01-08 ENCOUNTER — ANESTHESIA (OUTPATIENT)
Dept: POSTOP/PACU | Facility: HOSPITAL | Age: 75
End: 2025-01-08
Payer: MEDICARE

## 2025-01-08 VITALS
SYSTOLIC BLOOD PRESSURE: 134 MMHG | TEMPERATURE: 98 F | HEART RATE: 79 BPM | BODY MASS INDEX: 29 KG/M2 | DIASTOLIC BLOOD PRESSURE: 79 MMHG | RESPIRATION RATE: 15 BRPM | OXYGEN SATURATION: 98 % | HEIGHT: 67 IN

## 2025-01-08 DIAGNOSIS — F33.2 SEVERE RECURRENT MAJOR DEPRESSION WITHOUT PSYCHOTIC FEATURES (HCC): ICD-10-CM

## 2025-01-08 RX ORDER — HYDROMORPHONE HYDROCHLORIDE 1 MG/ML
0.4 INJECTION, SOLUTION INTRAMUSCULAR; INTRAVENOUS; SUBCUTANEOUS EVERY 5 MIN PRN
Status: ACTIVE | OUTPATIENT
Start: 2025-01-08 | End: 2025-01-08

## 2025-01-08 RX ORDER — HYDROMORPHONE HYDROCHLORIDE 1 MG/ML
0.6 INJECTION, SOLUTION INTRAMUSCULAR; INTRAVENOUS; SUBCUTANEOUS EVERY 5 MIN PRN
Status: ACTIVE | OUTPATIENT
Start: 2025-01-08 | End: 2025-01-08

## 2025-01-08 RX ORDER — ONDANSETRON 2 MG/ML
4 INJECTION INTRAMUSCULAR; INTRAVENOUS EVERY 6 HOURS PRN
Status: DISCONTINUED | OUTPATIENT
Start: 2025-01-08 | End: 2025-01-10

## 2025-01-08 RX ORDER — SODIUM CHLORIDE, SODIUM LACTATE, POTASSIUM CHLORIDE, CALCIUM CHLORIDE 600; 310; 30; 20 MG/100ML; MG/100ML; MG/100ML; MG/100ML
INJECTION, SOLUTION INTRAVENOUS CONTINUOUS
Status: DISCONTINUED | OUTPATIENT
Start: 2025-01-08 | End: 2025-01-10

## 2025-01-08 RX ORDER — LABETALOL HYDROCHLORIDE 5 MG/ML
5 INJECTION, SOLUTION INTRAVENOUS EVERY 5 MIN PRN
Status: ACTIVE | OUTPATIENT
Start: 2025-01-08 | End: 2025-01-08

## 2025-01-08 RX ORDER — NALOXONE HYDROCHLORIDE 0.4 MG/ML
0.08 INJECTION, SOLUTION INTRAMUSCULAR; INTRAVENOUS; SUBCUTANEOUS AS NEEDED
Status: ACTIVE | OUTPATIENT
Start: 2025-01-08 | End: 2025-01-08

## 2025-01-08 RX ORDER — HYDROMORPHONE HYDROCHLORIDE 1 MG/ML
0.2 INJECTION, SOLUTION INTRAMUSCULAR; INTRAVENOUS; SUBCUTANEOUS EVERY 5 MIN PRN
Status: ACTIVE | OUTPATIENT
Start: 2025-01-08 | End: 2025-01-08

## 2025-01-08 RX ORDER — ACETAMINOPHEN 500 MG
1000 TABLET ORAL ONCE AS NEEDED
Status: ACTIVE | OUTPATIENT
Start: 2025-01-08 | End: 2025-01-08

## 2025-01-08 RX ORDER — ONDANSETRON 2 MG/ML
8 INJECTION INTRAMUSCULAR; INTRAVENOUS ONCE
Status: COMPLETED | OUTPATIENT
Start: 2025-01-08 | End: 2025-01-08

## 2025-01-08 RX ORDER — KETAMINE HYDROCHLORIDE 50 MG/ML
INJECTION, SOLUTION INTRAMUSCULAR; INTRAVENOUS
Status: COMPLETED
Start: 2025-01-08 | End: 2025-01-08

## 2025-01-08 RX ORDER — KETOROLAC TROMETHAMINE 30 MG/ML
INJECTION, SOLUTION INTRAMUSCULAR; INTRAVENOUS
Status: COMPLETED
Start: 2025-01-08 | End: 2025-01-08

## 2025-01-08 RX ORDER — HYDRALAZINE HYDROCHLORIDE 20 MG/ML
INJECTION INTRAMUSCULAR; INTRAVENOUS
Status: COMPLETED
Start: 2025-01-08 | End: 2025-01-08

## 2025-01-08 RX ORDER — CAFFEINE AND SODIUM BENZOATE 125 MG/ML
750 INJECTION, SOLUTION INTRAMUSCULAR; INTRAVENOUS ONCE
Status: COMPLETED | OUTPATIENT
Start: 2025-01-08 | End: 2025-01-08

## 2025-01-08 RX ORDER — ETOMIDATE 2 MG/ML
INJECTION INTRAVENOUS AS NEEDED
Status: DISCONTINUED | OUTPATIENT
Start: 2025-01-08 | End: 2025-01-08 | Stop reason: SURG

## 2025-01-08 RX ORDER — KETAMINE HYDROCHLORIDE 50 MG/ML
INJECTION, SOLUTION INTRAMUSCULAR; INTRAVENOUS AS NEEDED
Status: DISCONTINUED | OUTPATIENT
Start: 2025-01-08 | End: 2025-01-08 | Stop reason: SURG

## 2025-01-08 RX ORDER — HYDRALAZINE HYDROCHLORIDE 20 MG/ML
10 INJECTION INTRAMUSCULAR; INTRAVENOUS ONCE
Status: COMPLETED | OUTPATIENT
Start: 2025-01-08 | End: 2025-01-08

## 2025-01-08 RX ORDER — CAFFEINE AND SODIUM BENZOATE 125 MG/ML
INJECTION, SOLUTION INTRAMUSCULAR; INTRAVENOUS
Status: COMPLETED
Start: 2025-01-08 | End: 2025-01-08

## 2025-01-08 RX ORDER — LABETALOL HYDROCHLORIDE 5 MG/ML
INJECTION, SOLUTION INTRAVENOUS AS NEEDED
Status: DISCONTINUED | OUTPATIENT
Start: 2025-01-08 | End: 2025-01-08 | Stop reason: SURG

## 2025-01-08 RX ORDER — KETOROLAC TROMETHAMINE 30 MG/ML
15 INJECTION, SOLUTION INTRAMUSCULAR; INTRAVENOUS ONCE
Status: COMPLETED | OUTPATIENT
Start: 2025-01-08 | End: 2025-01-08

## 2025-01-08 RX ORDER — MIDAZOLAM HYDROCHLORIDE 1 MG/ML
1 INJECTION INTRAMUSCULAR; INTRAVENOUS EVERY 5 MIN PRN
Status: ACTIVE | OUTPATIENT
Start: 2025-01-08 | End: 2025-01-08

## 2025-01-08 RX ORDER — DIPHENHYDRAMINE HYDROCHLORIDE 50 MG/ML
12.5 INJECTION INTRAMUSCULAR; INTRAVENOUS AS NEEDED
Status: ACTIVE | OUTPATIENT
Start: 2025-01-08 | End: 2025-01-08

## 2025-01-08 RX ORDER — ONDANSETRON 2 MG/ML
INJECTION INTRAMUSCULAR; INTRAVENOUS
Status: COMPLETED
Start: 2025-01-08 | End: 2025-01-08

## 2025-01-08 RX ADMIN — KETAMINE HYDROCHLORIDE 25 MG: 50 INJECTION, SOLUTION INTRAMUSCULAR; INTRAVENOUS at 06:57:00

## 2025-01-08 RX ADMIN — LABETALOL HYDROCHLORIDE 5 MG: 5 INJECTION, SOLUTION INTRAVENOUS at 07:07:00

## 2025-01-08 RX ADMIN — ONDANSETRON 4 MG: 2 INJECTION INTRAMUSCULAR; INTRAVENOUS at 06:31:00

## 2025-01-08 RX ADMIN — HYDRALAZINE HYDROCHLORIDE 10 MG: 20 INJECTION INTRAMUSCULAR; INTRAVENOUS at 06:25:00

## 2025-01-08 RX ADMIN — SODIUM CHLORIDE, SODIUM LACTATE, POTASSIUM CHLORIDE, CALCIUM CHLORIDE: 600; 310; 30; 20 INJECTION, SOLUTION INTRAVENOUS at 06:55:00

## 2025-01-08 RX ADMIN — LABETALOL HYDROCHLORIDE 5 MG: 5 INJECTION, SOLUTION INTRAVENOUS at 06:55:00

## 2025-01-08 RX ADMIN — SODIUM CHLORIDE, SODIUM LACTATE, POTASSIUM CHLORIDE, CALCIUM CHLORIDE: 600; 310; 30; 20 INJECTION, SOLUTION INTRAVENOUS at 06:30:00

## 2025-01-08 RX ADMIN — CAFFEINE AND SODIUM BENZOATE 750 MG: 125 INJECTION, SOLUTION INTRAMUSCULAR; INTRAVENOUS at 06:31:00

## 2025-01-08 RX ADMIN — KETOROLAC TROMETHAMINE 15 MG: 30 INJECTION, SOLUTION INTRAMUSCULAR; INTRAVENOUS at 06:30:00

## 2025-01-08 RX ADMIN — ETOMIDATE 14 MG: 2 INJECTION INTRAVENOUS at 06:57:00

## 2025-01-08 NOTE — ANESTHESIA POSTPROCEDURE EVALUATION
[facility]    Robe Nunez Patient Status:  Outpatient   Age/Gender 74 year old male MRN [id number]   Location [facility] POST ANESTHESIA CARE UNIT Attending Bryn Taylor MD   Hosp Day # 0 PCP Mango Boland MD       Anesthesia Post-op Note        Procedure Summary       Date: 01/08/25 Room / Location: [facility] Post Anesthesia Care Unit    Anesthesia Start: 0655 Anesthesia Stop: 0708    Procedure: ECT(BEDSIDE) Diagnosis: Severe recurrent major depression without psychotic features (HCC)    Scheduled Providers:  Anesthesiologist: Isamar Thomas MD    Anesthesia Type: general ASA Status: 2            Anesthesia Type: general    Vitals Value Taken Time   /106 01/08/25 0709   Temp 98 01/08/25 0709   Pulse 93 01/08/25 0709   Resp 16 01/08/25 0709   SpO2 99 01/08/25 0709           Patient Location: PACU    Anesthesia Type: general    Airway Patency: patent    Postop Pain Control: adequate    Mental Status: mildly sedated but able to meaningfully participate in the post-anesthesia evaluation    Nausea/Vomiting: none    Cardiopulmonary/Hydration status: stable euvolemic    Complications: no apparent anesthesia related complications    Postop vital signs: stable    Dental Exam: Unchanged from Preop    Patient to be discharged from PACU when criteria met.

## 2025-01-08 NOTE — PROGRESS NOTES
[facility] / [facility]  ECT Procedure Note    Robe Nunez Patient Status:  Outpatient   Age/Gender 74 year old male MRN [id number]   Location [facility] POST ANESTHESIA CARE UNIT Attending Bryn Taylor MD   Hosp Day # 0 PCP Mango Boland MD     1/8/2025    ECT Number: #8 total.  #4 bitemporal    Diagnosis: Major Depression Recurrent Severe Without Psychotic Features. F33.2    Type of ECT:  Bitemporal    Place of Service:  Outpatient    Settings:   1.  Energy Percentage: 100%    2.  Program:  Pulse Width  0.75    Pre-ECT Evaluation    Symptoms:  Patient seen.  Overall patient reports having periods of significant improvement in mood.  He reports he has been very happy with the positive response.  Patient noted to have reported greater than 75% improvement in overall mood.  Patient noted to have no pato or psychosis.  No suicidal thoughts.  He reports no cognitive or physical complaints.  We discussed treatment options and plan on second ECT this week and if doing well to change to 1 ECT next week.  Discussed pros and cons of longer term maintenance ECT.  Patient shows capacity to make decisions.    Risk/Benefits:  Discussed with patient side effects of ECT including headache, teeth, jaw, cardiac, pulmonary, NPO, aspiration, allergic reactions, anesthetic reactions, musculoskeletal, neurologic, morbidity/mortality, potential lack of efficacy, unilateral/bilateral ECT, relapse/maintenance issues, cognitive risks including memory, concentration, cognition, and other risks.    Side Effects:  Patient notes no cognitive/physical complaints    Exam:  Mood: less depressed and less anxious  Affect: Congruent  Memory:  intact immediate, recent, remote and as evidenced by ability to present consistent history  Concentration:   good  Suicidal ideation: no suicidal ideation    Prior to procedure, reviewed with treatment team correct patient, time of procedure and type of ECT.  Also reviewed with  anesthesia pre-ECT medications.    Patient Monitored:  B/P, EKG, EEG, Pulse Ox, Left Ankle Cuff    Pre-ECT Medications: Toradol 15 mg IV, Hydralazine 10 mg IV 30 min prior to treatment, and Zofran 8 mg IV and caffeine 750 mg IV    ECT Medications:  Labetalol 5 mg IV, Anesthetic  Etomidate 14 mg IV followed by ketamine 25 mg IV, and Succinylcholine 80 mg IV    Seizure Duration:  Motor: 16 seconds       EE seconds    Post-ECT Condition:  Treatment unremarkable    Post-ECT Medications:  None     Bryn Taylor MD

## 2025-01-08 NOTE — ANESTHESIA PREPROCEDURE EVALUATION
PRE-OP EVALUATION    Patient Name: Robe Nunez    Admit Diagnosis: Severe recurrent major depression without psychotic features ([facility]) [F33.2]    Pre-op Diagnosis: * No pre-op diagnosis entered *        Anesthesia Procedure: ECT(BEDSIDE)    * No surgeons found in log *    Pre-op vitals reviewed.  Temp: 98 °F (36.7 °C)  Pulse: 88  Resp: 12  BP: 149/67  SpO2: 100 %  Body mass index is 28.51 kg/m².    Current medications reviewed.  Hospital Medications:   lactated ringers infusion   Intravenous Continuous    [COMPLETED] ketorolac (Toradol) 30 MG/ML injection 15 mg  15 mg Intravenous Once    [COMPLETED] caffeine-sodium benzoate 125-125 mg/mL injection  750 mg Intravenous Once    [COMPLETED] ondansetron (Zofran) 4 MG/2ML injection 8 mg  8 mg Intravenous Once    [COMPLETED] hydrALAzine (Apresoline) 20 mg/mL injection 10 mg  10 mg Intravenous Once    [COMPLETED] ondansetron (Zofran) 4 MG/2ML injection        [COMPLETED] ketorolac (Toradol) 30 MG/ML injection        [COMPLETED] caffeine-sodium benzoate 125-125 MG/ML injection        [COMPLETED] hydrALAzine (Apresoline) 20 mg/mL injection        [COMPLETED] ketamine (Ketalar) 50 MG/ML injection        lactated ringers infusion   Intravenous Continuous    lactated ringers IV bolus 500 mL  500 mL Intravenous Once PRN    atropine 0.1 MG/ML injection 0.5 mg  0.5 mg Intravenous PRN    naloxone (Narcan) 0.4 MG/ML injection 0.08 mg  0.08 mg Intravenous PRN    fentaNYL (Sublimaze) 50 mcg/mL injection 25 mcg  25 mcg Intravenous Q5 Min PRN    Or    fentaNYL (Sublimaze) 50 mcg/mL injection 50 mcg  50 mcg Intravenous Q5 Min PRN    HYDROmorphone (Dilaudid) 1 MG/ML injection 0.2 mg  0.2 mg Intravenous Q5 Min PRN    Or    HYDROmorphone (Dilaudid) 1 MG/ML injection 0.4 mg  0.4 mg Intravenous Q5 Min PRN    Or    HYDROmorphone (Dilaudid) 1 MG/ML injection 0.6 mg  0.6 mg Intravenous Q5 Min PRN    [COMPLETED] caffeine-sodium benzoate 125-125 MG/ML injection           Outpatient Medications:    Prescriptions Prior to Admission[1]    Allergies: Other and Seasonal      Anesthesia Evaluation    Patient summary reviewed.    Anesthetic Complications           GI/Hepatic/Renal    Negative GI/hepatic/renal ROS.                             Cardiovascular            MET: >4      (+) hypertension                                     Endo/Other    Negative endo/other ROS.                              Pulmonary    Negative pulmonary ROS.                       Neuro/Psych      (+) depression                                Past Surgical History:   Procedure Laterality Date    Colonoscopy & polypectomy  05/09/2016    diverticulosis and a small polyp was removed; ASC    Colonoscopy,biopsy N/A 05/09/2016    Procedure: COLONOSCOPY, POSSIBLE BIOPSY, POSSIBLE POLYPECTOMY 84585;  Surgeon: Shaun Mock MD;  Location: [facility]    Other surgical history      scope knee    Other surgical history  11/29/2024    Aquablation of the prostate    Patient documented not to have experienced any of the following events N/A 05/09/2016    Procedure: COLONOSCOPY, POSSIBLE BIOPSY, POSSIBLE POLYPECTOMY 68310;  Surgeon: Shaun Mock MD;  Location: [facility]    Patient withough preoperative order for iv antibiotic surgical site infection prophylaxis. N/A 05/09/2016    Procedure: COLONOSCOPY, POSSIBLE BIOPSY, POSSIBLE POLYPECTOMY 47070;  Surgeon: Shaun Mock MD;  Location: [facility]    Special service or report  1990s    R knee arthroscopy lat meniscus     Social History     Socioeconomic History    Marital status:    Tobacco Use    Smoking status: Never    Smokeless tobacco: Never   Vaping Use    Vaping status: Unknown   Substance and Sexual Activity    Alcohol use: No     Alcohol/week: 0.0 - 1.0 standard drinks of alcohol     Comment: rare    Drug use: No    Sexual activity: Yes     History   Drug Use No     Available pre-op labs reviewed.  Lab Results   Component Value Date    WBC  6.5 11/17/2024    RBC 3.89 11/17/2024    HGB 11.6 (L) 11/17/2024    HCT 32.3 (L) 11/17/2024    MCV 83.0 11/17/2024    MCH 29.8 11/17/2024    MCHC 35.9 11/17/2024    RDW 13.2 11/17/2024    .0 11/17/2024     Lab Results   Component Value Date     11/17/2024    K 3.7 11/17/2024     11/17/2024    CO2 28.0 11/17/2024    BUN 16 11/17/2024    CREATSERUM 1.15 11/17/2024    GLU 88 11/17/2024    CA 9.8 11/17/2024            Airway      Mallampati: II  Mouth opening: >3 FB  TM distance: > 6 cm  Neck ROM: full Cardiovascular    Cardiovascular exam normal.         Dental             Pulmonary    Pulmonary exam normal.                 Other findings              ASA: 2   Plan: general  NPO status verified and     Post-procedure pain management plan discussed with surgeon and patient.    Comment:    I explained intrinsic risks of general anesthesia, including nausea, dental damage, sore throat, mouth injury,and hoarseness from airway management.  All questions were answered and understanding was demonstrated of risks.  Informed permission was obtained to proceed as documented in the signed consent form.      Plan/risks discussed with: patient      Consented to blood products.          Present on Admission:  **None**         [1] (Not in a hospital admission)

## 2025-01-08 NOTE — PROGRESS NOTES
[facility] / [facility]  ECT History & Physical    Robe Nunez Patient Status:  Outpatient   Age/Gender 74 year old male MRN [id number]   Location [facility] POST ANESTHESIA CARE UNIT Attending Bryn Taylor MD   Hosp Day # 0 PCP Mango Boland MD     Date: 1/8/2025    Diagnosis: Major depression recurrent severe    Procedure:  ECT    HPI:     Patient seen.  Patient reports no cognitive or physical complaints      Medical History:  Past Medical History:    Cancer (HCC)    Skin cancer    Depression    Headache disorder    High blood pressure    High cholesterol    HTN (hypertension)    Hyperlipidemia    Nonspecific elevation of levels of transaminase or lactic acid dehydrogenase (LDH)    s/p liver biopsy normal    Visual impairment    glasses       Surgical History:  Past Surgical History:   Procedure Laterality Date    Colonoscopy & polypectomy  05/09/2016    diverticulosis and a small polyp was removed; ASC    Colonoscopy,biopsy N/A 05/09/2016    Procedure: COLONOSCOPY, POSSIBLE BIOPSY, POSSIBLE POLYPECTOMY 45684;  Surgeon: Shaun Mock MD;  Location: [facility]    Other surgical history      scope knee    Other surgical history  11/29/2024    Aquablation of the prostate    Patient documented not to have experienced any of the following events N/A 05/09/2016    Procedure: COLONOSCOPY, POSSIBLE BIOPSY, POSSIBLE POLYPECTOMY 73297;  Surgeon: Shaun Mock MD;  Location: [facility]    Patient withough preoperative order for iv antibiotic surgical site infection prophylaxis. N/A 05/09/2016    Procedure: COLONOSCOPY, POSSIBLE BIOPSY, POSSIBLE POLYPECTOMY 08904;  Surgeon: Shaun Mock MD;  Location: [facility]    Special service or report  1990s    R knee arthroscopy lat meniscus       Family History:  Family History   Problem Relation Age of Onset    Hypertension Father     Diabetes Father     Lipids Brother     Cancer Brother         prostate ca    Other  (Other[other]) Sister         ?sle       ROS:  Unremarkable    Physical Exam:   /67 (BP Location: Left arm)   Pulse 88   Temp 98 °F (36.7 °C) (Temporal)   Resp 12   Ht 67\"   SpO2 100%   BMI 28.51 kg/m²     General Appearance:    Alert, cooperative, no distress, appears stated age   Head:    Normocephalic, without obvious abnormality, atraumatic   Eyes:    PERRL, conjunctiva/corneas clear, EOM's intact       Nose:   Nares normal, septum midline, mucosa normal, no drainage    or sinus tenderness   Throat:   Lips, mucosa, and tongue normal; teeth and gums normal   Neck:   Supple, symmetrical, trachea midline   Lungs:     Clear to auscultation bilaterally, respirations unlabored    Heart:    Regular rate and rhythm, S1 and S2 normal, no murmur, rub   or gallop   Abdomen:     Soft, non-tender, bowel sounds active all four quadrants,     no masses, no organomegaly   Extremities:   Extremities normal, atraumatic, no cyanosis or edema   Pulses:   2+ and symmetric all extremities   Skin:   Skin color, texture, turgor normal, no rashes or lesions   Neurologic:   CNII-XII intact, normal strength, sensation and reflexes     throughout     Impressions & Plans: Major depression recurrent severe.  Bitemporal ECT    I have discussed the risks and benefits and alternatives with the patient/family.  They understand and agree to proceed with plan of care.    Bryn Taylor MD

## 2025-01-10 ENCOUNTER — HOSPITAL ENCOUNTER (OUTPATIENT)
Dept: POSTOP/PACU | Facility: HOSPITAL | Age: 75
Discharge: HOME | End: 2025-01-10
Attending: Other
Payer: MEDICARE

## 2025-01-10 ENCOUNTER — ANESTHESIA (OUTPATIENT)
Dept: POSTOP/PACU | Facility: HOSPITAL | Age: 75
End: 2025-01-10
Payer: MEDICARE

## 2025-01-10 ENCOUNTER — ANESTHESIA EVENT (OUTPATIENT)
Dept: POSTOP/PACU | Facility: HOSPITAL | Age: 75
End: 2025-01-10
Payer: MEDICARE

## 2025-01-10 VITALS
OXYGEN SATURATION: 95 % | DIASTOLIC BLOOD PRESSURE: 78 MMHG | HEART RATE: 91 BPM | TEMPERATURE: 99 F | RESPIRATION RATE: 15 BRPM | BODY MASS INDEX: 28.56 KG/M2 | HEIGHT: 67 IN | WEIGHT: 182 LBS | SYSTOLIC BLOOD PRESSURE: 153 MMHG

## 2025-01-10 DIAGNOSIS — F33.2 SEVERE RECURRENT MAJOR DEPRESSION WITHOUT PSYCHOTIC FEATURES (HCC): ICD-10-CM

## 2025-01-10 RX ORDER — SODIUM CHLORIDE, SODIUM LACTATE, POTASSIUM CHLORIDE, CALCIUM CHLORIDE 600; 310; 30; 20 MG/100ML; MG/100ML; MG/100ML; MG/100ML
INJECTION, SOLUTION INTRAVENOUS CONTINUOUS
Status: DISCONTINUED | OUTPATIENT
Start: 2025-01-10 | End: 2025-01-12

## 2025-01-10 RX ORDER — KETAMINE HYDROCHLORIDE 50 MG/ML
INJECTION, SOLUTION INTRAMUSCULAR; INTRAVENOUS AS NEEDED
Status: DISCONTINUED | OUTPATIENT
Start: 2025-01-10 | End: 2025-01-10 | Stop reason: SURG

## 2025-01-10 RX ORDER — CAFFEINE AND SODIUM BENZOATE 125 MG/ML
INJECTION, SOLUTION INTRAMUSCULAR; INTRAVENOUS
Status: COMPLETED
Start: 2025-01-10 | End: 2025-01-10

## 2025-01-10 RX ORDER — CAFFEINE AND SODIUM BENZOATE 125 MG/ML
750 INJECTION, SOLUTION INTRAMUSCULAR; INTRAVENOUS ONCE
Status: COMPLETED | OUTPATIENT
Start: 2025-01-10 | End: 2025-01-10

## 2025-01-10 RX ORDER — LABETALOL HYDROCHLORIDE 5 MG/ML
INJECTION, SOLUTION INTRAVENOUS AS NEEDED
Status: DISCONTINUED | OUTPATIENT
Start: 2025-01-10 | End: 2025-01-10 | Stop reason: SURG

## 2025-01-10 RX ORDER — HYDROMORPHONE HYDROCHLORIDE 1 MG/ML
0.4 INJECTION, SOLUTION INTRAMUSCULAR; INTRAVENOUS; SUBCUTANEOUS EVERY 5 MIN PRN
Status: ACTIVE | OUTPATIENT
Start: 2025-01-10 | End: 2025-01-10

## 2025-01-10 RX ORDER — HYDRALAZINE HYDROCHLORIDE 20 MG/ML
INJECTION INTRAMUSCULAR; INTRAVENOUS
Status: COMPLETED
Start: 2025-01-10 | End: 2025-01-10

## 2025-01-10 RX ORDER — ONDANSETRON 2 MG/ML
INJECTION INTRAMUSCULAR; INTRAVENOUS
Status: COMPLETED
Start: 2025-01-10 | End: 2025-01-10

## 2025-01-10 RX ORDER — KETOROLAC TROMETHAMINE 30 MG/ML
INJECTION, SOLUTION INTRAMUSCULAR; INTRAVENOUS
Status: COMPLETED
Start: 2025-01-10 | End: 2025-01-10

## 2025-01-10 RX ORDER — HYDRALAZINE HYDROCHLORIDE 20 MG/ML
10 INJECTION INTRAMUSCULAR; INTRAVENOUS ONCE
Status: COMPLETED | OUTPATIENT
Start: 2025-01-10 | End: 2025-01-10

## 2025-01-10 RX ORDER — NALOXONE HYDROCHLORIDE 0.4 MG/ML
0.08 INJECTION, SOLUTION INTRAMUSCULAR; INTRAVENOUS; SUBCUTANEOUS AS NEEDED
Status: ACTIVE | OUTPATIENT
Start: 2025-01-10 | End: 2025-01-10

## 2025-01-10 RX ORDER — HYDROMORPHONE HYDROCHLORIDE 1 MG/ML
0.2 INJECTION, SOLUTION INTRAMUSCULAR; INTRAVENOUS; SUBCUTANEOUS EVERY 5 MIN PRN
Status: ACTIVE | OUTPATIENT
Start: 2025-01-10 | End: 2025-01-10

## 2025-01-10 RX ORDER — HYDROMORPHONE HYDROCHLORIDE 1 MG/ML
0.6 INJECTION, SOLUTION INTRAMUSCULAR; INTRAVENOUS; SUBCUTANEOUS EVERY 5 MIN PRN
Status: ACTIVE | OUTPATIENT
Start: 2025-01-10 | End: 2025-01-10

## 2025-01-10 RX ORDER — ETOMIDATE 2 MG/ML
INJECTION INTRAVENOUS AS NEEDED
Status: DISCONTINUED | OUTPATIENT
Start: 2025-01-10 | End: 2025-01-10 | Stop reason: SURG

## 2025-01-10 RX ORDER — KETOROLAC TROMETHAMINE 30 MG/ML
30 INJECTION, SOLUTION INTRAMUSCULAR; INTRAVENOUS ONCE
Status: COMPLETED | OUTPATIENT
Start: 2025-01-10 | End: 2025-01-10

## 2025-01-10 RX ORDER — ONDANSETRON 2 MG/ML
8 INJECTION INTRAMUSCULAR; INTRAVENOUS ONCE
Status: COMPLETED | OUTPATIENT
Start: 2025-01-10 | End: 2025-01-10

## 2025-01-10 RX ADMIN — KETAMINE HYDROCHLORIDE 25 MG: 50 INJECTION, SOLUTION INTRAMUSCULAR; INTRAVENOUS at 06:57:00

## 2025-01-10 RX ADMIN — KETOROLAC TROMETHAMINE 30 MG: 30 INJECTION, SOLUTION INTRAMUSCULAR; INTRAVENOUS at 06:02:00

## 2025-01-10 RX ADMIN — HYDRALAZINE HYDROCHLORIDE 10 MG: 20 INJECTION INTRAMUSCULAR; INTRAVENOUS at 06:03:00

## 2025-01-10 RX ADMIN — LABETALOL HYDROCHLORIDE 5 MG: 5 INJECTION, SOLUTION INTRAVENOUS at 06:55:00

## 2025-01-10 RX ADMIN — CAFFEINE AND SODIUM BENZOATE 750 MG: 125 INJECTION, SOLUTION INTRAMUSCULAR; INTRAVENOUS at 06:37:00

## 2025-01-10 RX ADMIN — ONDANSETRON 8 MG: 2 INJECTION INTRAMUSCULAR; INTRAVENOUS at 06:07:00

## 2025-01-10 RX ADMIN — ETOMIDATE 14 MG: 2 INJECTION INTRAVENOUS at 06:57:00

## 2025-01-10 RX ADMIN — SODIUM CHLORIDE, SODIUM LACTATE, POTASSIUM CHLORIDE, CALCIUM CHLORIDE: 600; 310; 30; 20 INJECTION, SOLUTION INTRAVENOUS at 05:59:00

## 2025-01-10 NOTE — DISCHARGE INSTRUCTIONS
Discharge Instructions  Electroconvulsive Therapy    Activities:  You MUST arrange to have a responsible adult drive you home and have a responsible adult stay with you the rest of the day and overnight.  Do not drive today.  Do not operate any machinery today. Use kitchen equipment with caution.  Rest and take it easy today.  Do not take public transportation without the presence of another responsible adult for 24 hours    Medications:  Resume your regular medications when you get home  The nurse will instruct you not to take any NSAIDS (Advil, Aleve, Motrin, Ibuprofen) before 1 pm today because you were given a certain medication during the procedure.      Diet:  You may resume your regular diet when you get home  Do not drink alcohol for 24 hours    Additional Instructions:  Someone should call you to schedule any upcoming ECT treatments. Call as needed to schedule or cancel your ECT appointments [phone].  If you have any questions or concerns, please call your own psychiatrist.  For your safety, please do not wear make-up to any future ECT appointments.  For any questions regarding your ECT appointment, please call [facility] [phone].  For cancellations after hours, call [phone] and leave a message.    Expected Recovery:  As you awaken, you may experience one or more of the following:  Headache, nausea, temporary confusion, or muscle stiffness.  If these symptoms increase, become severe or are accompanied by a fever of more than 101, please seek medical attention.  The ECT may affect memory.  Many patients report loss of memory for events that occurred in the days, weeks or months surrounding the ECT.  Many of these memories may return, but not always.  Short-term memory may also be affected for months, but this can also be a result of the disorder that you have.

## 2025-01-10 NOTE — PROGRESS NOTES
[facility] / [facility]  ECT History & Physical    Robe Nunez Patient Status:  Outpatient   Age/Gender 74 year old male MRN [id number]   Location [facility] POST ANESTHESIA CARE UNIT Attending Bryn Taylor MD   Hosp Day # 0 PCP Mango Boland MD     Date: 1/10/2025    Diagnosis: Major depression recurrent severe    Procedure:  ECT    HPI:     Patient seen.  Patient reports no cognitive or physical complaints      Medical History:  Past Medical History:    Cancer (HCC)    Skin cancer    Depression    Headache disorder    High blood pressure    High cholesterol    HTN (hypertension)    Hyperlipidemia    Nonspecific elevation of levels of transaminase or lactic acid dehydrogenase (LDH)    s/p liver biopsy normal    Visual impairment    glasses       Surgical History:  Past Surgical History:   Procedure Laterality Date    Colonoscopy & polypectomy  05/09/2016    diverticulosis and a small polyp was removed; ASC    Colonoscopy,biopsy N/A 05/09/2016    Procedure: COLONOSCOPY, POSSIBLE BIOPSY, POSSIBLE POLYPECTOMY 12153;  Surgeon: Shaun Mock MD;  Location: [facility]    Other surgical history      scope knee    Other surgical history  11/29/2024    Aquablation of the prostate    Patient documented not to have experienced any of the following events N/A 05/09/2016    Procedure: COLONOSCOPY, POSSIBLE BIOPSY, POSSIBLE POLYPECTOMY 37559;  Surgeon: Shaun Mock MD;  Location: [facility]    Patient withough preoperative order for iv antibiotic surgical site infection prophylaxis. N/A 05/09/2016    Procedure: COLONOSCOPY, POSSIBLE BIOPSY, POSSIBLE POLYPECTOMY 95902;  Surgeon: Shaun Mock MD;  Location: [facility]    Special service or report  1990s    R knee arthroscopy lat meniscus       Family History:  Family History   Problem Relation Age of Onset    Hypertension Father     Diabetes Father     Lipids Brother     Cancer Brother         prostate ca    Other  (Other[other]) Sister         ?sle       ROS:  Unremarkable    Physical Exam:   /82   Pulse 84   Temp 99.1 °F (37.3 °C) (Temporal)   Resp (!) 9   Ht 67\"   Wt 82.6 kg (182 lb)   SpO2 94%   BMI 28.51 kg/m²     General Appearance:    Alert, cooperative, no distress, appears stated age   Head:    Normocephalic, without obvious abnormality, atraumatic   Eyes:    PERRL, conjunctiva/corneas clear, EOM's intact       Nose:   Nares normal, septum midline, mucosa normal, no drainage    or sinus tenderness   Throat:   Lips, mucosa, and tongue normal; teeth and gums normal   Neck:   Supple, symmetrical, trachea midline   Lungs:     Clear to auscultation bilaterally, respirations unlabored    Heart:    Regular rate and rhythm, S1 and S2 normal, no murmur, rub   or gallop   Abdomen:     Soft, non-tender, bowel sounds active all four quadrants,     no masses, no organomegaly   Extremities:   Extremities normal, atraumatic, no cyanosis or edema   Pulses:   2+ and symmetric all extremities   Skin:   Skin color, texture, turgor normal, no rashes or lesions   Neurologic:   CNII-XII intact, normal strength, sensation and reflexes     throughout     Impressions & Plans: Major depression recurrent severe.  Bitemporal ECT    I have discussed the risks and benefits and alternatives with the patient/family.  They understand and agree to proceed with plan of care.    Bryn Taylor MD

## 2025-01-10 NOTE — ANESTHESIA POSTPROCEDURE EVALUATION
[facility]    Robe Nunez Patient Status:  Outpatient   Age/Gender 74 year old male MRN [id number]   Location [facility] POST ANESTHESIA CARE UNIT Attending Bryn Taylor MD   Hosp Day # 0 PCP Mango Boland MD       Anesthesia Post-op Note        Procedure Summary       Date: 01/10/25 Room / Location: [facility] Post Anesthesia Care Unit    Anesthesia Start: 0652 Anesthesia Stop: 0706    Procedure: ECT(BEDSIDE) Diagnosis: Severe recurrent major depression without psychotic features (HCC)    Scheduled Providers:  Anesthesiologist: Juan Corbin MD    Anesthesia Type: general ASA Status: 2            Anesthesia Type: general    Vitals Value Taken Time   /89 01/10/25 0706   Temp 98 °F (36.7 °C) 01/10/25 0706   Pulse 88 01/10/25 0706   Resp 16 01/10/25 0706   SpO2 98 % 01/10/25 0706           Patient Location: PACU    Anesthesia Type: general    Airway Patency: patent    Postop Pain Control: adequate    Mental Status: mildly sedated but able to meaningfully participate in the post-anesthesia evaluation    Nausea/Vomiting: none    Cardiopulmonary/Hydration status: stable euvolemic    Complications: no apparent anesthesia related complications    Postop vital signs: stable    Dental Exam: Unchanged from Preop    Patient to be discharged from PACU when criteria met.

## 2025-01-10 NOTE — PROGRESS NOTES
[facility] / [facility]  ECT Procedure Note    Robe Nunez Patient Status:  Outpatient   Age/Gender 74 year old male MRN [id number]   Location [facility] POST ANESTHESIA CARE UNIT Attending Bryn Taylor MD   Hosp Day # 0 PCP Mango Boland MD     1/10/2025    ECT Number: #9 total.  #5 bitemporal    Diagnosis: Major Depression Recurrent Severe Without Psychotic Features. F33.2    Type of ECT:  Bitemporal    Place of Service:  Outpatient    Settings:   1.  Energy Percentage: 100%    2.  Program:  DGX    Pre-ECT Evaluation    Symptoms:  Patient seen.  Overall patient noted to have significant improvement in mood.  Patient reports greater than 75% to his normal baseline which she feels has been extremely good.  Patient noted no suicidal thoughts.  Patient reports a decrease in anxiety.  No pato or psychosis.  Patient reports no cognitive or physical complaints.  Discussed recommendation for doing ECT next week and also to consider maintenance.  Patient noted significant right issue.  Patient processed risks of potential decompensation of mood with slowing or stopping ECT.  We discussed treatment options and risks.  At this point as patient has been doing well and has a ride issues next week, we will plan on doing ECT in 2 weeks.  Patient understands potential decompensation risks and to call office if any signs or symptoms.  Patient shows capacity to make decisions.    Risk/Benefits:  Discussed with patient side effects of ECT including headache, teeth, jaw, cardiac, pulmonary, NPO, aspiration, allergic reactions, anesthetic reactions, musculoskeletal, neurologic, morbidity/mortality, potential lack of efficacy, unilateral/bilateral ECT, relapse/maintenance issues, cognitive risks including memory, concentration, cognition, and other risks.    Side Effects:  Patient notes no cognitive/physical complaints    Exam:  Mood: less depressed and less anxious  Affect: Congruent  Memory:  intact  immediate, recent, remote and as evidenced by ability to present consistent history  Concentration:   good  Suicidal ideation: no suicidal ideation    Prior to procedure, reviewed with treatment team correct patient, time of procedure and type of ECT.  Also reviewed with anesthesia pre-ECT medications.    Patient Monitored:  B/P, EKG, EEG, Pulse Ox, Left Ankle Cuff    Pre-ECT Medications: Toradol 15 mg IV, Hydralazine 10 mg IV 30 min prior to treatment, and Zofran 8 mg IV and caffeine 750 mg IV    ECT Medications:  Labetalol 5 mg IV, Anesthetic  Etomidate 14 mg IV followed by ketamine 25 mg IV, and Succinylcholine 80 mg IV    Seizure Duration:  Motor: 18 seconds       EE seconds    Post-ECT Condition:  Treatment unremarkable    Post-ECT Medications:  None     Bryn Taylor MD

## 2025-01-10 NOTE — ANESTHESIA PREPROCEDURE EVALUATION
PRE-OP EVALUATION    Patient Name: Robe Nunez    Admit Diagnosis: Severe recurrent major depression without psychotic features (HCC) [F33.2]    Pre-op Diagnosis: * No pre-op diagnosis entered *        Anesthesia Procedure: ECT(BEDSIDE)    * No surgeons found in log *    Pre-op vitals reviewed.  Temp: 96.9 °F (36.1 °C)  Pulse: 75  Resp: 12  BP: 151/84  SpO2: 97 %  Body mass index is 28.51 kg/m².    Current medications reviewed.  Hospital Medications:   lactated ringers infusion   Intravenous Continuous    [COMPLETED] ketorolac (Toradol) 30 MG/ML injection 30 mg  30 mg Intravenous Once    [COMPLETED] ondansetron (Zofran) 4 MG/2ML injection 8 mg  8 mg Intravenous Once    [COMPLETED] caffeine-sodium benzoate 125-125 mg/mL injection  750 mg Intravenous Once    [COMPLETED] hydrALAzine (Apresoline) 20 mg/mL injection 10 mg  10 mg Intravenous Once    [COMPLETED] caffeine-sodium benzoate 125-125 MG/ML injection        [COMPLETED] ondansetron (Zofran) 4 MG/2ML injection        [COMPLETED] caffeine-sodium benzoate 125-125 MG/ML injection           Outpatient Medications:   Prescriptions Prior to Admission[1]    Allergies: Other and Seasonal      Anesthesia Evaluation    Patient summary reviewed.    Anesthetic Complications           GI/Hepatic/Renal    Negative GI/hepatic/renal ROS.                             Cardiovascular            MET: >4      (+) hypertension                                     Endo/Other    Negative endo/other ROS.                              Pulmonary    Negative pulmonary ROS.                       Neuro/Psych      (+) depression                                Past Surgical History:   Procedure Laterality Date    Colonoscopy & polypectomy  05/09/2016    diverticulosis and a small polyp was removed; ASC    Colonoscopy,biopsy N/A 05/09/2016    Procedure: COLONOSCOPY, POSSIBLE BIOPSY, POSSIBLE POLYPECTOMY 71146;  Surgeon: Shaun Mock MD;  Location: [facility]    Other surgical  history      scope knee    Other surgical history  11/29/2024    Aquablation of the prostate    Patient documented not to have experienced any of the following events N/A 05/09/2016    Procedure: COLONOSCOPY, POSSIBLE BIOPSY, POSSIBLE POLYPECTOMY 30845;  Surgeon: Shaun Mock MD;  Location: [facility]    Patient withough preoperative order for iv antibiotic surgical site infection prophylaxis. N/A 05/09/2016    Procedure: COLONOSCOPY, POSSIBLE BIOPSY, POSSIBLE POLYPECTOMY 78915;  Surgeon: Shaun Mock MD;  Location: [facility]    Special service or report  1990s    R knee arthroscopy lat meniscus     Social History     Socioeconomic History    Marital status:    Tobacco Use    Smoking status: Never    Smokeless tobacco: Never   Vaping Use    Vaping status: Unknown   Substance and Sexual Activity    Alcohol use: No     Alcohol/week: 0.0 - 1.0 standard drinks of alcohol     Comment: rare    Drug use: No    Sexual activity: Yes     History   Drug Use No     Available pre-op labs reviewed.  Lab Results   Component Value Date    WBC 6.5 11/17/2024    RBC 3.89 11/17/2024    HGB 11.6 (L) 11/17/2024    HCT 32.3 (L) 11/17/2024    MCV 83.0 11/17/2024    MCH 29.8 11/17/2024    MCHC 35.9 11/17/2024    RDW 13.2 11/17/2024    .0 11/17/2024     Lab Results   Component Value Date     11/17/2024    K 3.7 11/17/2024     11/17/2024    CO2 28.0 11/17/2024    BUN 16 11/17/2024    CREATSERUM 1.15 11/17/2024    GLU 88 11/17/2024    CA 9.8 11/17/2024            Airway      Mallampati: II  Mouth opening: >3 FB  TM distance: > 6 cm  Neck ROM: full Cardiovascular    Cardiovascular exam normal.         Dental             Pulmonary    Pulmonary exam normal.                 Other findings              ASA: 2   Plan: general  NPO status verified and     Post-procedure pain management plan discussed with surgeon and patient.    Comment:    I explained intrinsic risks of general  anesthesia, including nausea, dental damage, sore throat, mouth injury,and hoarseness from airway management.  All questions were answered and understanding was demonstrated of risks.  Informed permission was obtained to proceed as documented in the signed consent form.      Plan/risks discussed with: patient      Consented to blood products.          Present on Admission:  **None**         [1] (Not in a hospital admission)

## 2025-01-15 ENCOUNTER — HOSPITAL ENCOUNTER (OUTPATIENT)
Dept: POSTOP/PACU | Facility: HOSPITAL | Age: 75
Discharge: HOME | End: 2025-01-15
Attending: Other
Payer: MEDICARE

## 2025-01-15 ENCOUNTER — ANESTHESIA (OUTPATIENT)
Dept: POSTOP/PACU | Facility: HOSPITAL | Age: 75
End: 2025-01-15
Payer: MEDICARE

## 2025-01-15 ENCOUNTER — ANESTHESIA EVENT (OUTPATIENT)
Dept: POSTOP/PACU | Facility: HOSPITAL | Age: 75
End: 2025-01-15
Payer: MEDICARE

## 2025-01-15 VITALS
OXYGEN SATURATION: 95 % | RESPIRATION RATE: 11 BRPM | SYSTOLIC BLOOD PRESSURE: 143 MMHG | TEMPERATURE: 99 F | DIASTOLIC BLOOD PRESSURE: 85 MMHG | HEART RATE: 79 BPM | BODY MASS INDEX: 29 KG/M2 | HEIGHT: 67 IN

## 2025-01-15 DIAGNOSIS — F33.2 SEVERE RECURRENT MAJOR DEPRESSION WITHOUT PSYCHOTIC FEATURES (HCC): ICD-10-CM

## 2025-01-15 RX ORDER — CAFFEINE AND SODIUM BENZOATE 125 MG/ML
INJECTION, SOLUTION INTRAMUSCULAR; INTRAVENOUS
Status: COMPLETED
Start: 2025-01-15 | End: 2025-01-15

## 2025-01-15 RX ORDER — KETAMINE HYDROCHLORIDE 50 MG/ML
INJECTION, SOLUTION INTRAMUSCULAR; INTRAVENOUS
Status: COMPLETED
Start: 2025-01-15 | End: 2025-01-15

## 2025-01-15 RX ORDER — KETAMINE HYDROCHLORIDE 50 MG/ML
INJECTION, SOLUTION INTRAMUSCULAR; INTRAVENOUS AS NEEDED
Status: DISCONTINUED | OUTPATIENT
Start: 2025-01-15 | End: 2025-01-15 | Stop reason: SURG

## 2025-01-15 RX ORDER — ETOMIDATE 2 MG/ML
INJECTION INTRAVENOUS AS NEEDED
Status: DISCONTINUED | OUTPATIENT
Start: 2025-01-15 | End: 2025-01-15 | Stop reason: SURG

## 2025-01-15 RX ORDER — CAFFEINE AND SODIUM BENZOATE 125 MG/ML
750 INJECTION, SOLUTION INTRAMUSCULAR; INTRAVENOUS ONCE
Status: COMPLETED | OUTPATIENT
Start: 2025-01-15 | End: 2025-01-15

## 2025-01-15 RX ORDER — LABETALOL HYDROCHLORIDE 5 MG/ML
INJECTION, SOLUTION INTRAVENOUS AS NEEDED
Status: DISCONTINUED | OUTPATIENT
Start: 2025-01-15 | End: 2025-01-15 | Stop reason: SURG

## 2025-01-15 RX ORDER — ONDANSETRON 2 MG/ML
8 INJECTION INTRAMUSCULAR; INTRAVENOUS ONCE
Status: COMPLETED | OUTPATIENT
Start: 2025-01-15 | End: 2025-01-15

## 2025-01-15 RX ORDER — HYDROMORPHONE HYDROCHLORIDE 1 MG/ML
0.6 INJECTION, SOLUTION INTRAMUSCULAR; INTRAVENOUS; SUBCUTANEOUS EVERY 5 MIN PRN
Status: ACTIVE | OUTPATIENT
Start: 2025-01-15 | End: 2025-01-15

## 2025-01-15 RX ORDER — SODIUM CHLORIDE, SODIUM LACTATE, POTASSIUM CHLORIDE, CALCIUM CHLORIDE 600; 310; 30; 20 MG/100ML; MG/100ML; MG/100ML; MG/100ML
INJECTION, SOLUTION INTRAVENOUS CONTINUOUS
Status: DISCONTINUED | OUTPATIENT
Start: 2025-01-15 | End: 2025-01-17

## 2025-01-15 RX ORDER — HYDRALAZINE HYDROCHLORIDE 20 MG/ML
10 INJECTION INTRAMUSCULAR; INTRAVENOUS ONCE
Status: COMPLETED | OUTPATIENT
Start: 2025-01-15 | End: 2025-01-15

## 2025-01-15 RX ORDER — HYDROMORPHONE HYDROCHLORIDE 1 MG/ML
0.2 INJECTION, SOLUTION INTRAMUSCULAR; INTRAVENOUS; SUBCUTANEOUS EVERY 5 MIN PRN
Status: ACTIVE | OUTPATIENT
Start: 2025-01-15 | End: 2025-01-15

## 2025-01-15 RX ORDER — ONDANSETRON 2 MG/ML
4 INJECTION INTRAMUSCULAR; INTRAVENOUS EVERY 6 HOURS PRN
Status: DISCONTINUED | OUTPATIENT
Start: 2025-01-15 | End: 2025-01-17

## 2025-01-15 RX ORDER — KETOROLAC TROMETHAMINE 30 MG/ML
15 INJECTION, SOLUTION INTRAMUSCULAR; INTRAVENOUS ONCE
Status: COMPLETED | OUTPATIENT
Start: 2025-01-15 | End: 2025-01-15

## 2025-01-15 RX ORDER — METOCLOPRAMIDE HYDROCHLORIDE 5 MG/ML
10 INJECTION INTRAMUSCULAR; INTRAVENOUS EVERY 8 HOURS PRN
Status: DISCONTINUED | OUTPATIENT
Start: 2025-01-15 | End: 2025-01-17

## 2025-01-15 RX ORDER — NALOXONE HYDROCHLORIDE 0.4 MG/ML
0.08 INJECTION, SOLUTION INTRAMUSCULAR; INTRAVENOUS; SUBCUTANEOUS AS NEEDED
Status: ACTIVE | OUTPATIENT
Start: 2025-01-15 | End: 2025-01-15

## 2025-01-15 RX ORDER — HYDROMORPHONE HYDROCHLORIDE 1 MG/ML
0.4 INJECTION, SOLUTION INTRAMUSCULAR; INTRAVENOUS; SUBCUTANEOUS EVERY 5 MIN PRN
Status: ACTIVE | OUTPATIENT
Start: 2025-01-15 | End: 2025-01-15

## 2025-01-15 RX ORDER — KETOROLAC TROMETHAMINE 30 MG/ML
INJECTION, SOLUTION INTRAMUSCULAR; INTRAVENOUS
Status: COMPLETED
Start: 2025-01-15 | End: 2025-01-15

## 2025-01-15 RX ORDER — ONDANSETRON 2 MG/ML
INJECTION INTRAMUSCULAR; INTRAVENOUS
Status: COMPLETED
Start: 2025-01-15 | End: 2025-01-15

## 2025-01-15 RX ORDER — HYDRALAZINE HYDROCHLORIDE 20 MG/ML
INJECTION INTRAMUSCULAR; INTRAVENOUS
Status: COMPLETED
Start: 2025-01-15 | End: 2025-01-15

## 2025-01-15 RX ADMIN — ONDANSETRON 8 MG: 2 INJECTION INTRAMUSCULAR; INTRAVENOUS at 06:08:00

## 2025-01-15 RX ADMIN — SODIUM CHLORIDE, SODIUM LACTATE, POTASSIUM CHLORIDE, CALCIUM CHLORIDE: 600; 310; 30; 20 INJECTION, SOLUTION INTRAVENOUS at 06:05:00

## 2025-01-15 RX ADMIN — ETOMIDATE 14 MG: 2 INJECTION INTRAVENOUS at 07:18:00

## 2025-01-15 RX ADMIN — KETAMINE HYDROCHLORIDE 25 MG: 50 INJECTION, SOLUTION INTRAMUSCULAR; INTRAVENOUS at 07:18:00

## 2025-01-15 RX ADMIN — HYDRALAZINE HYDROCHLORIDE 10 MG: 20 INJECTION INTRAMUSCULAR; INTRAVENOUS at 06:06:00

## 2025-01-15 RX ADMIN — CAFFEINE AND SODIUM BENZOATE 750 MG: 125 INJECTION, SOLUTION INTRAMUSCULAR; INTRAVENOUS at 06:30:00

## 2025-01-15 RX ADMIN — LABETALOL HYDROCHLORIDE 5 MG: 5 INJECTION, SOLUTION INTRAVENOUS at 07:16:00

## 2025-01-15 RX ADMIN — KETOROLAC TROMETHAMINE 15 MG: 30 INJECTION, SOLUTION INTRAMUSCULAR; INTRAVENOUS at 06:03:00

## 2025-01-15 NOTE — PROGRESS NOTES
[facility] / [facility]  ECT History & Physical    Robe Nunez Patient Status:  Outpatient   Age/Gender 74 year old male MRN [id number]   Location [facility] POST ANESTHESIA CARE UNIT Attending Bryn Taylor MD   Hosp Day # 0 PCP Mango Boland MD     Date: 1/15/2025    Diagnosis: Major depression recurrent severe    Procedure:  ECT    HPI:     Patient seen.  Patient reports no cognitive or physical complaints      Medical History:  Past Medical History:    Cancer (HCC)    Skin cancer    Depression    Headache disorder    High blood pressure    High cholesterol    HTN (hypertension)    Hyperlipidemia    Nonspecific elevation of levels of transaminase or lactic acid dehydrogenase (LDH)    s/p liver biopsy normal    Visual impairment    glasses       Surgical History:  Past Surgical History:   Procedure Laterality Date    Colonoscopy & polypectomy  05/09/2016    diverticulosis and a small polyp was removed; ASC    Colonoscopy,biopsy N/A 05/09/2016    Procedure: COLONOSCOPY, POSSIBLE BIOPSY, POSSIBLE POLYPECTOMY 20005;  Surgeon: Shaun Mock MD;  Location: [facility]    Other surgical history      scope knee    Other surgical history  11/29/2024    Aquablation of the prostate    Patient documented not to have experienced any of the following events N/A 05/09/2016    Procedure: COLONOSCOPY, POSSIBLE BIOPSY, POSSIBLE POLYPECTOMY 11646;  Surgeon: Shaun Mock MD;  Location: [facility]    Patient withough preoperative order for iv antibiotic surgical site infection prophylaxis. N/A 05/09/2016    Procedure: COLONOSCOPY, POSSIBLE BIOPSY, POSSIBLE POLYPECTOMY 63362;  Surgeon: Shaun Mock MD;  Location: [facility]    Special service or report  1990s    R knee arthroscopy lat meniscus       Family History:  Family History   Problem Relation Age of Onset    Hypertension Father     Diabetes Father     Lipids Brother     Cancer Brother         prostate ca    Other  (Other[other]) Sister         ?sle       ROS:  Unremarkable    Physical Exam:   /85   Pulse 79   Temp 98.8 °F (37.1 °C) (Temporal)   Resp 13   Ht 67\"   SpO2 94%   BMI 28.51 kg/m²     General Appearance:    Alert, cooperative, no distress, appears stated age   Head:    Normocephalic, without obvious abnormality, atraumatic   Eyes:    PERRL, conjunctiva/corneas clear, EOM's intact       Nose:   Nares normal, septum midline, mucosa normal, no drainage    or sinus tenderness   Throat:   Lips, mucosa, and tongue normal; teeth and gums normal   Neck:   Supple, symmetrical, trachea midline   Lungs:     Clear to auscultation bilaterally, respirations unlabored    Heart:    Regular rate and rhythm, S1 and S2 normal, no murmur, rub   or gallop   Abdomen:     Soft, non-tender, bowel sounds active all four quadrants,     no masses, no organomegaly   Extremities:   Extremities normal, atraumatic, no cyanosis or edema   Pulses:   2+ and symmetric all extremities   Skin:   Skin color, texture, turgor normal, no rashes or lesions   Neurologic:   CNII-XII intact, normal strength, sensation and reflexes     throughout     Impressions & Plans: Major depression recurrent severe.  Bitemporal ECT    I have discussed the risks and benefits and alternatives with the patient/family.  They understand and agree to proceed with plan of care.    Bryn Taylor MD

## 2025-01-15 NOTE — ANESTHESIA PREPROCEDURE EVALUATION
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  Temp: 97.4 °F (36.3 °C)  Pulse: 80  Resp: 16  BP: 129/76  SpO2: 96 %  Body mass index is 28.51 kg/m².    Current medications reviewed.  Hospital Medications:   lactated ringers infusion   Intravenous Continuous    [COMPLETED] ketorolac (Toradol) 30 MG/ML injection 15 mg  15 mg Intravenous Once    [COMPLETED] ondansetron (Zofran) 4 MG/2ML injection 8 mg  8 mg Intravenous Once    [COMPLETED] caffeine-sodium benzoate 125-125 mg/mL injection  750 mg Intravenous Once    [COMPLETED] hydrALAzine (Apresoline) 20 mg/mL injection 10 mg  10 mg Intravenous Once    [COMPLETED] ondansetron (Zofran) 4 MG/2ML injection        [COMPLETED] ketamine (Ketalar) 50 MG/ML injection           Outpatient Medications:   Prescriptions Prior to Admission[1]    Allergies: Other and Seasonal      Anesthesia Evaluation    Patient summary reviewed.    Anesthetic Complications           GI/Hepatic/Renal    Negative GI/hepatic/renal ROS.                             Cardiovascular            MET: >4      (+) hypertension                                     Endo/Other    Negative endo/other ROS.                              Pulmonary    Negative pulmonary ROS.                       Neuro/Psych      (+) depression                                Past Surgical History:   Procedure Laterality Date    Colonoscopy & polypectomy  05/09/2016    diverticulosis and a small polyp was removed; ASC    Colonoscopy,biopsy N/A 05/09/2016    Procedure: COLONOSCOPY, POSSIBLE BIOPSY, POSSIBLE POLYPECTOMY 41817;  Surgeon: Shaun Mock MD;  [place] [facility] [place] [facility]    Other surgical history      scope knee    Other surgical history  11/29/2024    Aquablation of the prostate     Patient documented not to have experienced any of the following events N/A 05/09/2016    Procedure: COLONOSCOPY, POSSIBLE BIOPSY, POSSIBLE POLYPECTOMY 86838;  Surgeon: Shaun Mock MD;  Location: [facility]    Patient withough preoperative order for iv antibiotic surgical site infection prophylaxis. N/A 05/09/2016    Procedure: COLONOSCOPY, POSSIBLE BIOPSY, POSSIBLE POLYPECTOMY 63302;  Surgeon: Shaun Mock MD;  Location: [facility]    Special service or report  1990s    R knee arthroscopy lat meniscus     Social History     Socioeconomic History    Marital status:    Tobacco Use    Smoking status: Never    Smokeless tobacco: Never   Vaping Use    Vaping status: Unknown   Substance and Sexual Activity    Alcohol use: No     Alcohol/week: 0.0 - 1.0 standard drinks of alcohol     Comment: rare    Drug use: No    Sexual activity: Yes     History   Drug Use No     Available pre-op labs reviewed.  Lab Results   Component Value Date    WBC 6.5 11/17/2024    RBC 3.89 11/17/2024    HGB 11.6 (L) 11/17/2024    HCT 32.3 (L) 11/17/2024    MCV 83.0 11/17/2024    MCH 29.8 11/17/2024    MCHC 35.9 11/17/2024    RDW 13.2 11/17/2024    .0 11/17/2024     Lab Results   Component Value Date     11/17/2024    K 3.7 11/17/2024     11/17/2024    CO2 28.0 11/17/2024    BUN 16 11/17/2024    CREATSERUM 1.15 11/17/2024    GLU 88 11/17/2024    CA 9.8 11/17/2024            Airway      Mallampati: II  Mouth opening: >3 FB  TM distance: > 6 cm  Neck ROM: full Cardiovascular    Cardiovascular exam normal.         Dental             Pulmonary    Pulmonary exam normal.                 Other findings              ASA: 2   Plan: general  NPO status verified and     Post-procedure pain management plan discussed with surgeon and patient.    Comment:    I explained intrinsic risks of general anesthesia, including nausea, dental damage, sore throat, mouth injury,and hoarseness from airway  management.  All questions were answered and understanding was demonstrated of risks.  Informed permission was obtained to proceed as documented in the signed consent form.      Plan/risks discussed with: patient      Consented to blood products.          Present on Admission:  **None**             [1] (Not in a hospital admission)

## 2025-01-15 NOTE — ANESTHESIA POSTPROCEDURE EVALUATION
[facility]    Robe Nunez Patient Status:  Outpatient   Age/Gender 74 year old male MRN [id number]   Location [facility] POST ANESTHESIA CARE UNIT Attending Bryn Taylor MD   Hosp Day # 0 PCP Mango Boland MD       Anesthesia Post-op Note        Procedure Summary       Date: 01/15/25 Room / Location: [facility] Post Anesthesia Care Unit    Anesthesia Start: 0714 Anesthesia Stop: 0726    Procedure: ECT(BEDSIDE) Diagnosis: Severe recurrent major depression without psychotic features (HCC)    Scheduled Providers:  Anesthesiologist: Joseph Medrano DO    Anesthesia Type: general ASA Status: 2            Anesthesia Type: general    Vitals Value Taken Time   /80 01/15/25 0727   Temp 97 01/15/25 0727   Pulse 81 01/15/25 0727   Resp 16 01/15/25 0727   SpO2 100 01/15/25 0727           Patient Location: PACU    Anesthesia Type: general    Airway Patency: patent    Postop Pain Control: adequate    Mental Status: mildly sedated but able to meaningfully participate in the post-anesthesia evaluation    Nausea/Vomiting: none    Cardiopulmonary/Hydration status: stable euvolemic    Complications: no apparent anesthesia related complications    Postop vital signs: stable    Dental Exam: Unchanged from Preop    Patient to be discharged from PACU when criteria met.

## 2025-01-15 NOTE — DISCHARGE INSTRUCTIONS
Discharge Instructions  Electroconvulsive Therapy    Activities:  You MUST arrange to have a responsible adult drive you home and have a responsible adult stay with you the rest of the day and overnight.  Do not drive today.  Do not operate any machinery today. Use kitchen equipment with caution.  Rest and take it easy today.  Do not take public transportation without the presence of another responsible adult for 24 hours    Medications:  Resume your regular medications when you get home  The nurse will instruct you not to take any NSAIDS (Advil, Aleve, Motrin, Ibuprofen) before 1 pm today because you were given a certain medication during the procedure.    Diet:  You may resume your regular diet when you get home  Do not drink alcohol for 24 hours    Additional Instructions:  Someone should call you to schedule any upcoming ECT treatments. Call as needed to schedule or cancel your ECT appointments [phone].  If you have any questions or concerns, please call your own psychiatrist.  For your safety, please do not wear make-up to any future ECT appointments.  For any questions regarding your ECT appointment, please call [facility] [phone].  For cancellations after hours, call [phone] and leave a message.    Expected Recovery:  As you awaken, you may experience one or more of the following:  Headache, nausea, temporary confusion, or muscle stiffness.  If these symptoms increase, become severe or are accompanied by a fever of more than 101, please seek medical attention.  The ECT may affect memory.  Many patients report loss of memory for events that occurred in the days, weeks or months surrounding the ECT.  Many of these memories may return, but not always.  Short-term memory may also be affected for months, but this can also be a result of the disorder that you have.

## 2025-01-15 NOTE — PROGRESS NOTES
[facility] / [facility]  ECT Procedure Note    Robe Nunez Patient Status:  Outpatient   Age/Gender 74 year old male MRN [id number]   Location [facility] POST ANESTHESIA CARE UNIT Attending Bryn Taylor MD   Hosp Day # 0 PCP Mango Boland MD     1/15/2025    ECT Number: #10 total.  #6 bitemporal    Diagnosis: Major Depression Recurrent Severe Without Psychotic Features. F33.2    Type of ECT:  Bitemporal    Place of Service:  Outpatient    Settings:   1.  Energy Percentage: 100%    2.  Program:  DGX    Pre-ECT Evaluation    Symptoms:  Patient seen.  Overall patient noted to have some breakthrough dysphoria and anxiety.  Patient notes that overall continues to remain significantly better.  No suicidal thoughts.  No pato or psychosis.  Patient notes a overall improvement with interest and enjoyment.  Patient reports no cognitive or physical complaints.  Discussed treatment options with patient.  Patient shows capacity to make decisions.  Discussed risks and benefits.  Plan on 1 ECT next week and reevaluate.    Risk/Benefits:  Discussed with patient side effects of ECT including headache, teeth, jaw, cardiac, pulmonary, NPO, aspiration, allergic reactions, anesthetic reactions, musculoskeletal, neurologic, morbidity/mortality, potential lack of efficacy, unilateral/bilateral ECT, relapse/maintenance issues, cognitive risks including memory, concentration, cognition, and other risks.    Side Effects:  Patient notes no cognitive/physical complaints    Exam:  Mood: less depressed and less anxious  Affect: Congruent  Memory:  intact immediate, recent, remote and as evidenced by ability to present consistent history  Concentration:   good  Suicidal ideation: no suicidal ideation    Prior to procedure, reviewed with treatment team correct patient, time of procedure and type of ECT.  Also reviewed with anesthesia pre-ECT medications.    Patient Monitored:  B/P, EKG, EEG, Pulse Ox, Left Ankle  Cuff    Pre-ECT Medications: Toradol 15 mg IV, Hydralazine 10 mg IV 30 min prior to treatment, and Zofran 8 mg IV and caffeine 750 mg IV    ECT Medications:  Labetalol 5 mg IV, Anesthetic  Etomidate 14 mg IV followed by ketamine 25 mg IV, and Succinylcholine 80 mg IV    Seizure Duration:  Motor: 26 seconds       EE seconds    Post-ECT Condition:  Treatment unremarkable    Post-ECT Medications:  None     Bryn Taylor MD

## 2025-01-17 VITALS — HEIGHT: 67 IN | BODY MASS INDEX: 29 KG/M2

## 2025-01-22 ENCOUNTER — HOSPITAL ENCOUNTER (OUTPATIENT)
Dept: POSTOP/PACU | Facility: HOSPITAL | Age: 75
Discharge: HOME | End: 2025-01-22
Attending: Other
Payer: MEDICARE

## 2025-01-29 ENCOUNTER — ANESTHESIA EVENT (OUTPATIENT)
Dept: POSTOP/PACU | Facility: HOSPITAL | Age: 75
End: 2025-01-29
Payer: MEDICARE

## 2025-01-29 ENCOUNTER — HOSPITAL ENCOUNTER (OUTPATIENT)
Dept: POSTOP/PACU | Facility: HOSPITAL | Age: 75
Discharge: HOME | End: 2025-01-29
Attending: Other
Payer: MEDICARE

## 2025-01-29 ENCOUNTER — ANESTHESIA (OUTPATIENT)
Dept: POSTOP/PACU | Facility: HOSPITAL | Age: 75
End: 2025-01-29
Payer: MEDICARE

## 2025-01-29 VITALS
SYSTOLIC BLOOD PRESSURE: 145 MMHG | TEMPERATURE: 98 F | HEART RATE: 81 BPM | OXYGEN SATURATION: 97 % | BODY MASS INDEX: 29 KG/M2 | HEIGHT: 67 IN | DIASTOLIC BLOOD PRESSURE: 81 MMHG | RESPIRATION RATE: 11 BRPM

## 2025-01-29 DIAGNOSIS — F33.2 SEVERE RECURRENT MAJOR DEPRESSION WITHOUT PSYCHOTIC FEATURES (HCC): ICD-10-CM

## 2025-01-29 RX ORDER — HYDROMORPHONE HYDROCHLORIDE 1 MG/ML
0.4 INJECTION, SOLUTION INTRAMUSCULAR; INTRAVENOUS; SUBCUTANEOUS EVERY 5 MIN PRN
Status: ACTIVE | OUTPATIENT
Start: 2025-01-29 | End: 2025-01-29

## 2025-01-29 RX ORDER — KETAMINE HYDROCHLORIDE 50 MG/ML
INJECTION, SOLUTION INTRAMUSCULAR; INTRAVENOUS AS NEEDED
Status: DISCONTINUED | OUTPATIENT
Start: 2025-01-29 | End: 2025-01-29 | Stop reason: SURG

## 2025-01-29 RX ORDER — HYDROMORPHONE HYDROCHLORIDE 1 MG/ML
0.6 INJECTION, SOLUTION INTRAMUSCULAR; INTRAVENOUS; SUBCUTANEOUS EVERY 5 MIN PRN
Status: ACTIVE | OUTPATIENT
Start: 2025-01-29 | End: 2025-01-29

## 2025-01-29 RX ORDER — LABETALOL HYDROCHLORIDE 5 MG/ML
INJECTION, SOLUTION INTRAVENOUS AS NEEDED
Status: DISCONTINUED | OUTPATIENT
Start: 2025-01-29 | End: 2025-01-29 | Stop reason: SURG

## 2025-01-29 RX ORDER — ETOMIDATE 2 MG/ML
INJECTION INTRAVENOUS AS NEEDED
Status: DISCONTINUED | OUTPATIENT
Start: 2025-01-29 | End: 2025-01-29 | Stop reason: SURG

## 2025-01-29 RX ORDER — NALOXONE HYDROCHLORIDE 0.4 MG/ML
0.08 INJECTION, SOLUTION INTRAMUSCULAR; INTRAVENOUS; SUBCUTANEOUS AS NEEDED
Status: ACTIVE | OUTPATIENT
Start: 2025-01-29 | End: 2025-01-29

## 2025-01-29 RX ORDER — SODIUM CHLORIDE, SODIUM LACTATE, POTASSIUM CHLORIDE, CALCIUM CHLORIDE 600; 310; 30; 20 MG/100ML; MG/100ML; MG/100ML; MG/100ML
INJECTION, SOLUTION INTRAVENOUS CONTINUOUS
Status: DISCONTINUED | OUTPATIENT
Start: 2025-01-29 | End: 2025-01-31

## 2025-01-29 RX ORDER — CAFFEINE AND SODIUM BENZOATE 125 MG/ML
750 INJECTION, SOLUTION INTRAMUSCULAR; INTRAVENOUS ONCE
Status: COMPLETED | OUTPATIENT
Start: 2025-01-29 | End: 2025-01-29

## 2025-01-29 RX ORDER — ONDANSETRON 2 MG/ML
INJECTION INTRAMUSCULAR; INTRAVENOUS
Status: COMPLETED
Start: 2025-01-29 | End: 2025-01-29

## 2025-01-29 RX ORDER — CAFFEINE AND SODIUM BENZOATE 125 MG/ML
INJECTION, SOLUTION INTRAMUSCULAR; INTRAVENOUS
Status: COMPLETED
Start: 2025-01-29 | End: 2025-01-29

## 2025-01-29 RX ORDER — HYDROMORPHONE HYDROCHLORIDE 1 MG/ML
0.2 INJECTION, SOLUTION INTRAMUSCULAR; INTRAVENOUS; SUBCUTANEOUS EVERY 5 MIN PRN
Status: ACTIVE | OUTPATIENT
Start: 2025-01-29 | End: 2025-01-29

## 2025-01-29 RX ORDER — KETOROLAC TROMETHAMINE 30 MG/ML
15 INJECTION, SOLUTION INTRAMUSCULAR; INTRAVENOUS ONCE
Status: COMPLETED | OUTPATIENT
Start: 2025-01-29 | End: 2025-01-29

## 2025-01-29 RX ORDER — ONDANSETRON 2 MG/ML
8 INJECTION INTRAMUSCULAR; INTRAVENOUS ONCE
Status: COMPLETED | OUTPATIENT
Start: 2025-01-29 | End: 2025-01-29

## 2025-01-29 RX ORDER — KETOROLAC TROMETHAMINE 30 MG/ML
INJECTION, SOLUTION INTRAMUSCULAR; INTRAVENOUS
Status: COMPLETED
Start: 2025-01-29 | End: 2025-01-29

## 2025-01-29 RX ORDER — HYDRALAZINE HYDROCHLORIDE 20 MG/ML
INJECTION INTRAMUSCULAR; INTRAVENOUS
Status: COMPLETED
Start: 2025-01-29 | End: 2025-01-29

## 2025-01-29 RX ORDER — HYDRALAZINE HYDROCHLORIDE 20 MG/ML
20 INJECTION INTRAMUSCULAR; INTRAVENOUS ONCE
Status: COMPLETED | OUTPATIENT
Start: 2025-01-29 | End: 2025-01-29

## 2025-01-29 RX ADMIN — KETAMINE HYDROCHLORIDE 25 MG: 50 INJECTION, SOLUTION INTRAMUSCULAR; INTRAVENOUS at 07:01:00

## 2025-01-29 RX ADMIN — CAFFEINE AND SODIUM BENZOATE 750 MG: 125 INJECTION, SOLUTION INTRAMUSCULAR; INTRAVENOUS at 06:20:00

## 2025-01-29 RX ADMIN — KETOROLAC TROMETHAMINE 15 MG: 30 INJECTION, SOLUTION INTRAMUSCULAR; INTRAVENOUS at 06:20:00

## 2025-01-29 RX ADMIN — SODIUM CHLORIDE, SODIUM LACTATE, POTASSIUM CHLORIDE, CALCIUM CHLORIDE: 600; 310; 30; 20 INJECTION, SOLUTION INTRAVENOUS at 07:14:00

## 2025-01-29 RX ADMIN — SODIUM CHLORIDE, SODIUM LACTATE, POTASSIUM CHLORIDE, CALCIUM CHLORIDE: 600; 310; 30; 20 INJECTION, SOLUTION INTRAVENOUS at 06:19:00

## 2025-01-29 RX ADMIN — ONDANSETRON 8 MG: 2 INJECTION INTRAMUSCULAR; INTRAVENOUS at 06:19:00

## 2025-01-29 RX ADMIN — HYDRALAZINE HYDROCHLORIDE 20 MG: 20 INJECTION INTRAMUSCULAR; INTRAVENOUS at 06:19:00

## 2025-01-29 RX ADMIN — ETOMIDATE 14 MG: 2 INJECTION INTRAVENOUS at 07:01:00

## 2025-01-29 RX ADMIN — LABETALOL HYDROCHLORIDE 5 MG: 5 INJECTION, SOLUTION INTRAVENOUS at 07:01:00

## 2025-01-29 NOTE — PROGRESS NOTES
[facility] / [facility]  ECT Procedure Note    Robe Nunez Patient Status:  Outpatient   Age/Gender 74 year old male   MRN [id number]    Location [facility] POST ANESTHESIA CARE UNIT Attending No att. providers found   Hosp Day # 0 PCP Mango Boland MD     ECT Number: #11 total- # 7 Bitemporal     Diagnosis: Major Depression Recurrent Severe Without Psychotic Features. F33.2    Type of ECT:  Bitemporal    Place of Service:  Outpatient    Settings:   1.  Energy Percentage: 100%    2.  Program:  DGX    Pre-ECT Evaluation    Symptoms:      Prior to procedure, reviewed with treatment team correct patient, time of procedure and type of ECT.  Also reviewed with anesthesia pre-ECT medications    Patient notes continued improvement in his depression and anxiety.  Patient is noted to be smiling and engaged in conversation today.  Patient notes some improvement in function with daily routines.  Patient denies cognitive side effects.  He is sleeping and eating adequately.  Patient denies any pato or psychosis.  Patient's improvement in between ECT have persisted and consider spacing ECT further to 1-1/2 to 2 weeks    The patient continues to retain capacity to consent for ECT.    Risk/Benefits:  Discussed with patient side effects of ECT including headache, teeth, jaw, cardiac, pulmonary, NPO, aspiration, allergic reactions, anesthetic reactions, musculoskeletal, neurologic, morbidity/mortality, potential lack of efficacy, unilateral/bilateral ECT, relapse/maintenance issues, cognitive risks including memory, concentration, cognition, and other risks.    Side Effects:  Patient notes no cognitive/physical complaints    Exam:  Mood: less depressed and less anxious  Affect: Congruent and Colmer and brighter  Memory:  intact immediate, recent, remote and as evidenced by ability to present consistent history  Concentration:   good and as assessed by  ability to concentrate on our conversation  Suicidal  ideation: no suicidal ideation    Patient Monitored:  B/P, EKG, EEG, Pulse Ox, Left Ankle Cuff    Pre-ECT Medications:  Toradol 15 mg IV, Hydralazine 10 mg IV 30 min prior to treatment, and Zofran 8 mg IV and caffeine 750 mg IV     ECT Medications:  Labetalol 5 mg IV, Anesthetic  Etomidate 14 mg IV followed by ketamine 25 mg IV, and Succinylcholine 80 mg IV    Seizure Duration:  Motor: 30 seconds       EE seconds    Post-ECT Condition:  Treatment unremarkable    ECT Medications:  None     Frances Sung    2025

## 2025-01-29 NOTE — ANESTHESIA PREPROCEDURE EVALUATION
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  Temp: 97.7 °F (36.5 °C)  Pulse: 75  Resp: 18  BP: 144/80  SpO2: 98 %  Body mass index is 28.51 kg/m².    Current medications reviewed.  Hospital Medications:   lactated ringers infusion   Intravenous Continuous    [COMPLETED] ketorolac (Toradol) 30 MG/ML injection 15 mg  15 mg Intravenous Once    [COMPLETED] ondansetron (Zofran) 4 MG/2ML injection 8 mg  8 mg Intravenous Once    [COMPLETED] caffeine-sodium benzoate 125-125 mg/mL injection  750 mg Intravenous Once    [COMPLETED] hydrALAzine (Apresoline) 20 mg/mL injection 20 mg  20 mg Intravenous Once    [COMPLETED] ondansetron (Zofran) 4 MG/2ML injection           Outpatient Medications:   Prescriptions Prior to Admission[1]    Allergies: Other and Seasonal      Anesthesia Evaluation    Patient summary reviewed.    Anesthetic Complications           GI/Hepatic/Renal    Negative GI/hepatic/renal ROS.                             Cardiovascular            MET: >4      (+) hypertension                                     Endo/Other    Negative endo/other ROS.                              Pulmonary    Negative pulmonary ROS.                       Neuro/Psych      (+) depression                                Past Surgical History:   Procedure Laterality Date    Colonoscopy & polypectomy  05/09/2016    diverticulosis and a small polyp was removed; ASC    Colonoscopy,biopsy N/A 05/09/2016    Procedure: COLONOSCOPY, POSSIBLE BIOPSY, POSSIBLE POLYPECTOMY 77491;  Surgeon: Shaun Mock MD;  [place] [facility] [place] [facility]    Other surgical history      scope knee    Other surgical history  11/29/2024    Aquablation of the prostate    Patient documented not to have experienced any of the  following events N/A 05/09/2016    Procedure: COLONOSCOPY, POSSIBLE BIOPSY, POSSIBLE POLYPECTOMY 47039;  Surgeon: Shaun Mock MD;  Location: [facility]    Patient withough preoperative order for iv antibiotic surgical site infection prophylaxis. N/A 05/09/2016    Procedure: COLONOSCOPY, POSSIBLE BIOPSY, POSSIBLE POLYPECTOMY 55541;  Surgeon: Shaun Mock MD;  Location: [facility]    Special service or report  1990s    R knee arthroscopy lat meniscus     Social History     Socioeconomic History    Marital status:    Tobacco Use    Smoking status: Never    Smokeless tobacco: Never   Vaping Use    Vaping status: Unknown   Substance and Sexual Activity    Alcohol use: No     Alcohol/week: 0.0 - 1.0 standard drinks of alcohol     Comment: rare    Drug use: No    Sexual activity: Yes     History   Drug Use No     Available pre-op labs reviewed.  Lab Results   Component Value Date    WBC 6.5 11/17/2024    RBC 3.89 11/17/2024    HGB 11.6 (L) 11/17/2024    HCT 32.3 (L) 11/17/2024    MCV 83.0 11/17/2024    MCH 29.8 11/17/2024    MCHC 35.9 11/17/2024    RDW 13.2 11/17/2024    .0 11/17/2024     Lab Results   Component Value Date     11/17/2024    K 3.7 11/17/2024     11/17/2024    CO2 28.0 11/17/2024    BUN 16 11/17/2024    CREATSERUM 1.15 11/17/2024    GLU 88 11/17/2024    CA 9.8 11/17/2024            Airway      Mallampati: II  Mouth opening: >3 FB  TM distance: > 6 cm  Neck ROM: full Cardiovascular    Cardiovascular exam normal.  Rhythm: regular  Rate: normal     Dental    Dentition appears grossly intact         Pulmonary    Pulmonary exam normal.  Breath sounds clear to auscultation bilaterally.               Other findings              ASA: 2   Plan: general  NPO status verified and patient meets guidelines.    Post-procedure pain management plan discussed with surgeon and patient.      Plan/risks discussed with: patient  Use of blood product(s) discussed with:  patient              Present on Admission:  **None**             [1] (Not in a hospital admission)

## 2025-01-29 NOTE — ANESTHESIA POSTPROCEDURE EVALUATION
[place]    Robe Nunez Patient Status:  Outpatient   Age/Gender 74 year old male MRN [id number]   Location [facility] POST ANESTHESIA CARE UNIT Attending Bryn Taylor MD   Hosp Day # 0 PCP Mango Boland MD       Anesthesia Post-op Note        Procedure Summary       Date: 01/29/25 Room / Location: [place] Post Anesthesia Care Unit    Anesthesia Start: 0659 Anesthesia Stop:     Procedure: ECT(BEDSIDE) Diagnosis: Severe recurrent major depression without psychotic features (HCC)    Scheduled Providers:  Anesthesiologist: Walter Garcia MD    Anesthesia Type: general ASA Status: 2            Anesthesia Type: general    Vitals Value Taken Time   /72 01/29/25 0713   Temp  01/29/25 0714   Pulse 69 01/29/25 0713   Resp 16 01/29/25 0713   SpO2 97 % 01/29/25 0713           Patient Location: PACU    Anesthesia Type: general    Airway Patency: patent    Postop Pain Control: adequate    Mental Status: mildly sedated but able to meaningfully participate in the post-anesthesia evaluation    Nausea/Vomiting: none    Cardiopulmonary/Hydration status: stable euvolemic    Complications: no apparent anesthesia related complications    Postop vital signs: stable    Dental Exam: Unchanged from Preop    Patient to be discharged from PACU when criteria met.

## 2025-01-29 NOTE — DISCHARGE INSTRUCTIONS
Discharge Instructions  Electroconvulsive Therapy    Activities:  You MUST arrange to have a responsible adult drive you home and have a responsible adult stay with you the rest of the day and overnight.  Do not drive today.  Do not operate any machinery today. Use kitchen equipment with caution.  Rest and take it easy today.  Do not take public transportation without the presence of another responsible adult for 24 hours    Medications:  Resume your regular medications when you get home  The nurse will instruct you not to take any NSAIDS (Advil, Aleve, Motrin, Ibuprofen) before 1 pm today because you were given a certain medication during the procedure.    Diet:  You may resume your regular diet when you get home  Do not drink alcohol for 24 hours    Additional Instructions:  Someone should call you to schedule any upcoming ECT treatments. Call as needed to schedule or cancel your ECT appointments [phone].  If you have any questions or concerns, please call your own psychiatrist.  For your safety, please do not wear make-up to any future ECT appointments.  For any questions regarding your ECT appointment, please call [facility] [phone].  For cancellations after hours, call [phone] and leave a message.    Expected Recovery:  As you awaken, you may experience one or more of the following:  Headache, nausea, temporary confusion, or muscle stiffness.  If these symptoms increase, become severe or are accompanied by a fever of more than 101, please seek medical attention.  The ECT may affect memory.  Many patients report loss of memory for events that occurred in the days, weeks or months surrounding the ECT.  Many of these memories may return, but not always.  Short-term memory may also be affected for months, but this can also be a result of the disorder that you have.

## 2025-01-31 VITALS — HEIGHT: 67 IN | BODY MASS INDEX: 29 KG/M2

## 2025-01-31 NOTE — PROGRESS NOTES
[facility]  ECT History & Physical    Robe Nunez Patient Status:  Outpatient   Age/Gender 74 year old male MRN [id number]   Location [facility] POST ANESTHESIA CARE UNIT Attending No att. providers found   Hosp Day # 0 PCP Mango Boland MD     Date of Service: 1/31/2025    Diagnosis:  Major Depression Recurrent Severe Without Psychotic Features. F33.2    Procedure:  Bifrontal    HPI: Improving in all areas.  No physical complaints      Medical History:  Past Medical History:    Cancer (HCC)    Skin cancer    Depression    Headache disorder    High blood pressure    High cholesterol    HTN (hypertension)    Hyperlipidemia    Nonspecific elevation of levels of transaminase or lactic acid dehydrogenase (LDH)    s/p liver biopsy normal    Visual impairment    glasses       Surgical History:  Past Surgical History:   Procedure Laterality Date    Colonoscopy & polypectomy  05/09/2016    diverticulosis and a small polyp was removed; ASC    Colonoscopy,biopsy N/A 05/09/2016    Procedure: COLONOSCOPY, POSSIBLE BIOPSY, POSSIBLE POLYPECTOMY 87336;  Surgeon: Shaun Mock MD;  Location: [facility]    Other surgical history      scope knee    Other surgical history  11/29/2024    Aquablation of the prostate    Patient documented not to have experienced any of the following events N/A 05/09/2016    Procedure: COLONOSCOPY, POSSIBLE BIOPSY, POSSIBLE POLYPECTOMY 99606;  Surgeon: Shaun Mock MD;  Location: [facility]    Patient withough preoperative order for iv antibiotic surgical site infection prophylaxis. N/A 05/09/2016    Procedure: COLONOSCOPY, POSSIBLE BIOPSY, POSSIBLE POLYPECTOMY 78805;  Surgeon: Shaun Mock MD;  Location: [facility]    Special service or report  1990s    R knee arthroscopy lat meniscus       Family History:  Family History   Problem Relation Age of Onset    Hypertension Father     Diabetes Father     Lipids Brother     Cancer  Brother         prostate ca    Other (Other[other]) Sister         ?sle       Social History:  Social History     Socioeconomic History    Marital status:      Spouse name: Not on file    Number of children: Not on file    Years of education: Not on file    Highest education level: Not on file   Occupational History    Not on file   Tobacco Use    Smoking status: Never    Smokeless tobacco: Never   Vaping Use    Vaping status: Unknown   Substance and Sexual Activity    Alcohol use: No     Alcohol/week: 0.0 - 1.0 standard drinks of alcohol     Comment: rare    Drug use: No    Sexual activity: Yes   Other Topics Concern    Not on file   Social History Narrative    marital status:     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[address]    caffeine: few cups    blood tranfusion: no    hiv exposure: no    travel: domestic    exercise: health club 2 x week    mammo:      dexa:      pap:      colonoscopy: 2006 ok    lab: 2012, 2013,2014    prostate: 2013,2014    testicles: 2013,2013    influenza: 2012,2013    bdt: yes    pneumovax: no    zoster: 2013                         Social Drivers of Health     Financial Resource Strain: Low Risk  (11/29/2024)    Received from [facility]    Financial Resource Strain     In the past year, have you or any family members you live with been unable to get any of the following when it was really needed? Check all that apply.: None   Food Insecurity: Low Risk  (11/29/2024)    Received from [facility]    Food Insecurity     Within the past 12 months, you worried that your food would run out before you got money to buy more.  : Never true     Within the past 12 months, the food you bought just didn't last and you didn't have money to get more. : Never true   Transportation Needs: At Risk (11/29/2024)    Received from [facility]    Transportation Needs     In the past 12 months, has lack of reliable transportation kept you from medical appointments,  meetings, work or from getting things needed for daily living? : No;Yes   Physical Activity: Not on file   Stress: Not on file   Social Connections: Not on file   Housing Stability: Low Risk  (11/17/2024)    Housing Stability     Housing Instability: No     Housing Instability Emergency: Not on file     Crib or Bassinette: Not on file       ROS:  unremarkable    Physical Exam:   /81   Pulse 81   Temp 97.7 °F (36.5 °C) (Temporal)   Resp 11   Ht 67\"   SpO2 97%   BMI 28.51 kg/m²     General Appearance:    Alert, cooperative, no distress, appears stated age   Head:    Normocephalic, without obvious abnormality, atraumatic   Eyes:    PERRL, conjunctiva/corneas clear, EOM's intact   Nose:   Nares normal, septum midline, mucosa normal, no drainage    or sinus tenderness   Throat:   Lips, mucosa, and tongue normal; teeth and gums normal   Neck:   Supple, symmetrical, trachea midline   Lungs:     Clear to auscultation bilaterally, respirations unlabored    Heart:    Regular rate and rhythm, S1 and S2 normal, no murmur, rub or gallop   Abdomen:     Soft, non-tender, bowel sounds active all four quadrants,     no masses, no organomegaly   Extremities:   Extremities normal, atraumatic, no cyanosis or edema   Pulses:   2+ and symmetric all extremities   Skin:   Skin color, texture, turgor normal, no rashes or lesions   Neurologic:   CNII-XII intact, normal strength, sensation and reflexes     throughout     Impressions & Plans:    Diagnosis:  Major Depression Recurrent Severe Without Psychotic Features. F33.2    Procedure:  Bifrontal    I have discussed the risks and benefits and alternatives with the patient/family.  They understand and agree to proceed with plan of care.    Frances Sung MD  1/31/2025

## 2025-01-31 NOTE — PROGRESS NOTES
[facility]  ECT History & Physical    Robe Nunez Patient Status:  Outpatient   Age/Gender 74 year old male MRN [id number]   Location [facility] POST ANESTHESIA CARE UNIT Attending Bryn Taylor MD   Hosp Day # 0 PCP Mango Boland MD     Date of Service: 1/30/2025    Diagnosis:  Major Depression Recurrent Severe Without Psychotic Features. F33.2    Procedure:  Bitemporal    HPI: Improving in all areas.  No physical complaints      Medical History:  Past Medical History:    Cancer (HCC)    Skin cancer    Depression    Headache disorder    High blood pressure    High cholesterol    HTN (hypertension)    Hyperlipidemia    Nonspecific elevation of levels of transaminase or lactic acid dehydrogenase (LDH)    s/p liver biopsy normal    Visual impairment    glasses       Surgical History:  Past Surgical History:   Procedure Laterality Date    Colonoscopy & polypectomy  05/09/2016    diverticulosis and a small polyp was removed; ASC    Colonoscopy,biopsy N/A 05/09/2016    Procedure: COLONOSCOPY, POSSIBLE BIOPSY, POSSIBLE POLYPECTOMY 23284;  Surgeon: Shaun Mock MD;  Location: [facility]    Other surgical history      scope knee    Other surgical history  11/29/2024    Aquablation of the prostate    Patient documented not to have experienced any of the following events N/A 05/09/2016    Procedure: COLONOSCOPY, POSSIBLE BIOPSY, POSSIBLE POLYPECTOMY 27304;  Surgeon: Shaun Mock MD;  Location: [facility]    Patient withough preoperative order for iv antibiotic surgical site infection prophylaxis. N/A 05/09/2016    Procedure: COLONOSCOPY, POSSIBLE BIOPSY, POSSIBLE POLYPECTOMY 37824;  Surgeon: Shaun Mock MD;  Location: [facility]    Special service or report  1990s    R knee arthroscopy lat meniscus       Family History:  Family History   Problem Relation Age of Onset    Hypertension Father     Diabetes Father     Lipids Brother     Cancer  Brother         prostate ca    Other (Other[other]) Sister         ?sle       Social History:  Social History     Socioeconomic History    Marital status:      Spouse name: Not on file    Number of children: Not on file    Years of education: Not on file    Highest education level: Not on file   Occupational History    Not on file   Tobacco Use    Smoking status: Never    Smokeless tobacco: Never   Vaping Use    Vaping status: Unknown   Substance and Sexual Activity    Alcohol use: No     Alcohol/week: 0.0 - 1.0 standard drinks of alcohol     Comment: rare    Drug use: No    Sexual activity: Yes   Other Topics Concern    Not on file   Social History Narrative    marital status:     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[address]    caffeine: few cups    blood tranfusion: no    hiv exposure: no    travel: domestic    exercise: health club 2 x week    mammo:      dexa:      pap:      colonoscopy: 2006 ok    lab: 2012, 2013,2014    prostate: 2013,2014    testicles: 2013,2013    influenza: 2012,2013    bdt: yes    pneumovax: no    zoster: 2013                         Social Drivers of Health     Financial Resource Strain: Low Risk  (11/29/2024)    Received from [facility]    Financial Resource Strain     In the past year, have you or any family members you live with been unable to get any of the following when it was really needed? Check all that apply.: None   Food Insecurity: Low Risk  (11/29/2024)    Received from [facility]    Food Insecurity     Within the past 12 months, you worried that your food would run out before you got money to buy more.  : Never true     Within the past 12 months, the food you bought just didn't last and you didn't have money to get more. : Never true   Transportation Needs: At Risk (11/29/2024)    Received from [facility]    Transportation Needs     In the past 12 months, has lack of reliable transportation kept you from medical appointments,  meetings, work or from getting things needed for daily living? : No;Yes   Physical Activity: Not on file   Stress: Not on file   Social Connections: Not on file   Housing Stability: Low Risk  (11/17/2024)    Housing Stability     Housing Instability: No     Housing Instability Emergency: Not on file     Crib or Bassinette: Not on file       ROS:  unremarkable    Physical Exam:   /81   Pulse 81   Temp 97.7 °F (36.5 °C) (Temporal)   Resp 11   Ht 67\"   SpO2 97%   BMI 28.51 kg/m²     General Appearance:    Alert, cooperative, no distress, appears stated age   Head:    Normocephalic, without obvious abnormality, atraumatic   Eyes:    PERRL, conjunctiva/corneas clear, EOM's intact   Nose:   Nares normal, septum midline, mucosa normal, no drainage    or sinus tenderness   Throat:   Lips, mucosa, and tongue normal; teeth and gums normal   Neck:   Supple, symmetrical, trachea midline   Lungs:     Clear to auscultation bilaterally, respirations unlabored    Heart:    Regular rate and rhythm, S1 and S2 normal, no murmur, rub or gallop   Abdomen:     Soft, non-tender, bowel sounds active all four quadrants,     no masses, no organomegaly   Extremities:   Extremities normal, atraumatic, no cyanosis or edema   Pulses:   2+ and symmetric all extremities   Skin:   Skin color, texture, turgor normal, no rashes or lesions   Neurologic:   CNII-XII intact, normal strength, sensation and reflexes     throughout     Impressions & Plans:    Diagnosis:  Major Depression Recurrent Severe Without Psychotic Features. F33.2    Procedure:  Bitemporal    I have discussed the risks and benefits and alternatives with the patient/family.  They understand and agree to proceed with plan of care.    Frances Sung MD  1/30/2025

## 2025-02-10 ENCOUNTER — HOSPITAL ENCOUNTER (OUTPATIENT)
Dept: POSTOP/PACU | Facility: HOSPITAL | Age: 75
Discharge: HOME | End: 2025-02-10
Attending: Other
Payer: MEDICARE

## 2025-03-21 ENCOUNTER — HOSPITAL ENCOUNTER (OUTPATIENT)
Facility: HOSPITAL | Age: 75
Setting detail: OBSERVATION
Discharge: NURSING, REHAB OR LONG-TERM CARE | End: 2025-03-24
Attending: EMERGENCY MEDICINE | Admitting: INTERNAL MEDICINE
Payer: MEDICARE

## 2025-03-21 DIAGNOSIS — T42.4X4A BENZODIAZEPINE OVERDOSE OF UNDETERMINED INTENT, INITIAL ENCOUNTER: Primary | ICD-10-CM

## 2025-03-21 LAB
ALBUMIN SERPL-MCNC: 4.3 G/DL (ref 3.2–4.8)
ALBUMIN/GLOB SERPL: 1.6 {RATIO} (ref 1–2)
ALP LIVER SERPL-CCNC: 55 U/L
ALT SERPL-CCNC: 14 U/L
ANION GAP SERPL CALC-SCNC: 13 MMOL/L (ref 0–18)
APAP SERPL-MCNC: <2 UG/ML (ref 10–20)
APTT PPP: 27.4 SECONDS (ref 23–36)
AST SERPL-CCNC: 17 U/L (ref ?–34)
BASOPHILS # BLD AUTO: 0.04 X10(3) UL (ref 0–0.2)
BASOPHILS NFR BLD AUTO: 0.6 %
BILIRUB SERPL-MCNC: 0.8 MG/DL (ref 0.2–1.1)
BUN BLD-MCNC: 16 MG/DL (ref 9–23)
CALCIUM BLD-MCNC: 10.2 MG/DL (ref 8.7–10.6)
CHLORIDE SERPL-SCNC: 102 MMOL/L (ref 98–112)
CO2 SERPL-SCNC: 25 MMOL/L (ref 21–32)
CREAT BLD-MCNC: 1.15 MG/DL
EGFRCR SERPLBLD CKD-EPI 2021: 67 ML/MIN/1.73M2 (ref 60–?)
EOSINOPHIL # BLD AUTO: 0.14 X10(3) UL (ref 0–0.7)
EOSINOPHIL NFR BLD AUTO: 2 %
ERYTHROCYTE [DISTWIDTH] IN BLOOD BY AUTOMATED COUNT: 12.4 %
ETHANOL SERPL-MCNC: <3 MG/DL (ref ?–3)
GLOBULIN PLAS-MCNC: 2.7 G/DL (ref 2–3.5)
GLUCOSE BLD-MCNC: 93 MG/DL (ref 70–99)
HCT VFR BLD AUTO: 40.7 %
HGB BLD-MCNC: 13.9 G/DL
IMM GRANULOCYTES # BLD AUTO: 0.02 X10(3) UL (ref 0–1)
IMM GRANULOCYTES NFR BLD: 0.3 %
INR BLD: 1.06 (ref 0.8–1.2)
LYMPHOCYTES # BLD AUTO: 2.06 X10(3) UL (ref 1–4)
LYMPHOCYTES NFR BLD AUTO: 30.1 %
MCH RBC QN AUTO: 29.3 PG (ref 26–34)
MCHC RBC AUTO-ENTMCNC: 34.2 G/DL (ref 31–37)
MCV RBC AUTO: 85.9 FL
MONOCYTES # BLD AUTO: 0.4 X10(3) UL (ref 0.1–1)
MONOCYTES NFR BLD AUTO: 5.8 %
NEUTROPHILS # BLD AUTO: 4.18 X10 (3) UL (ref 1.5–7.7)
NEUTROPHILS # BLD AUTO: 4.18 X10(3) UL (ref 1.5–7.7)
NEUTROPHILS NFR BLD AUTO: 61.2 %
OSMOLALITY SERPL CALC.SUM OF ELEC: 291 MOSM/KG (ref 275–295)
PLATELET # BLD AUTO: 200 10(3)UL (ref 150–450)
POTASSIUM SERPL-SCNC: 4.4 MMOL/L (ref 3.5–5.1)
PROT SERPL-MCNC: 7 G/DL (ref 5.7–8.2)
PROTHROMBIN TIME: 13.9 SECONDS (ref 11.6–14.8)
RBC # BLD AUTO: 4.74 X10(6)UL
SALICYLATES SERPL-MCNC: <3 MG/DL (ref 3–20)
SARS-COV-2 RNA RESP QL NAA+PROBE: NOT DETECTED
SODIUM SERPL-SCNC: 140 MMOL/L (ref 136–145)
WBC # BLD AUTO: 6.8 X10(3) UL (ref 4–11)

## 2025-03-21 NOTE — ED INITIAL ASSESSMENT (HPI)
Pt found by family with empty pill  bottles at 1830  Pt states \"hopefully not\" when asked if he was si  Drowsy -   Daughter arrived at bs = pt told her he took all of his psych meds this am - approx 20 of each pill.

## 2025-03-22 ENCOUNTER — APPOINTMENT (OUTPATIENT)
Dept: CT IMAGING | Facility: HOSPITAL | Age: 75
End: 2025-03-22
Attending: STUDENT IN AN ORGANIZED HEALTH CARE EDUCATION/TRAINING PROGRAM
Payer: MEDICARE

## 2025-03-22 ENCOUNTER — APPOINTMENT (OUTPATIENT)
Dept: GENERAL RADIOLOGY | Facility: HOSPITAL | Age: 75
End: 2025-03-22
Attending: STUDENT IN AN ORGANIZED HEALTH CARE EDUCATION/TRAINING PROGRAM
Payer: MEDICARE

## 2025-03-22 PROBLEM — F33.2 SEVERE EPISODE OF RECURRENT MAJOR DEPRESSIVE DISORDER, WITHOUT PSYCHOTIC FEATURES (HCC): Status: ACTIVE | Noted: 2024-11-16

## 2025-03-22 PROBLEM — F99 INSOMNIA DUE TO OTHER MENTAL DISORDER: Status: ACTIVE | Noted: 2024-11-18

## 2025-03-22 PROBLEM — F51.05 INSOMNIA DUE TO OTHER MENTAL DISORDER: Status: ACTIVE | Noted: 2024-11-18

## 2025-03-22 PROBLEM — T42.4X4A BENZODIAZEPINE OVERDOSE OF UNDETERMINED INTENT, INITIAL ENCOUNTER: Status: ACTIVE | Noted: 2025-03-22

## 2025-03-22 LAB
AMPHET UR QL SCN: NEGATIVE
ARTERIAL PATENCY WRIST A: POSITIVE
BASE EXCESS BLDA CALC-SCNC: 2.8 MMOL/L (ref ?–2)
BILIRUB UR QL STRIP.AUTO: NEGATIVE
BODY TEMPERATURE: 98.6 F
CANNABINOIDS UR QL SCN: NEGATIVE
COCAINE UR QL: NEGATIVE
COHGB MFR BLD: 1.7 % SAT (ref 0–3)
CREAT UR-SCNC: 55.4 MG/DL
FENTANYL UR QL SCN: NEGATIVE
FIO2: 21 %
GLUCOSE UR STRIP.AUTO-MCNC: NORMAL MG/DL
HCO3 BLDA-SCNC: 27.1 MEQ/L (ref 21–27)
HGB BLD-MCNC: 13.2 G/DL
KETONES UR STRIP.AUTO-MCNC: NEGATIVE MG/DL
LEUKOCYTE ESTERASE UR QL STRIP.AUTO: 25
MDMA UR QL SCN: NEGATIVE
METHGB MFR BLD: 1 % SAT (ref 0.4–1.5)
NITRITE UR QL STRIP.AUTO: NEGATIVE
OPIATES UR QL SCN: NEGATIVE
OXYCODONE UR QL SCN: NEGATIVE
OXYHGB MFR BLDA: 95.5 % (ref 92–100)
PCO2 BLDA: 40 MM HG (ref 35–45)
PH BLDA: 7.44 [PH] (ref 7.35–7.45)
PH UR STRIP.AUTO: 6.5 [PH] (ref 5–8)
PO2 BLDA: 77 MM HG (ref 80–100)
PROT UR STRIP.AUTO-MCNC: 100 MG/DL
SP GR UR STRIP.AUTO: 1.02 (ref 1–1.03)
UROBILINOGEN UR STRIP.AUTO-MCNC: NORMAL MG/DL

## 2025-03-22 PROCEDURE — 70450 CT HEAD/BRAIN W/O DYE: CPT | Performed by: STUDENT IN AN ORGANIZED HEALTH CARE EDUCATION/TRAINING PROGRAM

## 2025-03-22 PROCEDURE — 71045 X-RAY EXAM CHEST 1 VIEW: CPT | Performed by: STUDENT IN AN ORGANIZED HEALTH CARE EDUCATION/TRAINING PROGRAM

## 2025-03-22 PROCEDURE — 90792 PSYCH DIAG EVAL W/MED SRVCS: CPT

## 2025-03-22 RX ORDER — ATORVASTATIN CALCIUM 10 MG/1
10 TABLET, FILM COATED ORAL NIGHTLY
Status: DISCONTINUED | OUTPATIENT
Start: 2025-03-22 | End: 2025-03-24

## 2025-03-22 RX ORDER — CHOLECALCIFEROL (VITAMIN D3) 50 MCG
2000 TABLET ORAL DAILY
Status: DISCONTINUED | OUTPATIENT
Start: 2025-03-22 | End: 2025-03-24

## 2025-03-22 RX ORDER — METOPROLOL SUCCINATE 25 MG/1
25 TABLET, EXTENDED RELEASE ORAL DAILY
Status: DISCONTINUED | OUTPATIENT
Start: 2025-03-22 | End: 2025-03-24

## 2025-03-22 RX ORDER — ACETAMINOPHEN 500 MG
500 TABLET ORAL EVERY 4 HOURS PRN
Status: DISCONTINUED | OUTPATIENT
Start: 2025-03-22 | End: 2025-03-24

## 2025-03-22 NOTE — ED PROVIDER NOTES
Patient reevaluated at 5:15 AM.  Patient was able to answer questions.  He was still drowsy.  Will admit to behavioral tele for observation

## 2025-03-22 NOTE — ED NOTES
Writer paged patient's psychiatrist from [facility], Dr. Reyes, @ 20:53    Dr. Reyes called back at 20:54.  Updated on patient and gave accepting for IP once medically cleared.  EMIL still pending review.

## 2025-03-22 NOTE — SLP NOTE
ADULT SWALLOWING EVALUATION    ASSESSMENT    ASSESSMENT/OVERALL IMPRESSION:  Robe, 74-year old male admitted with the following:  Benzodiazepine overdose of undetermined intent, initial encounter [T42.4X4A]    Patient sitting up in bed with 1:1 with RN present at bedside. Patient awake, alert, and participant. He denies history of dysphagia. Patient presents with reduced lingual ROM and reduced jaw opening, no asymmetry.    Patient was provided with PO trials of puree via TSP x4, thin liquids via cup x3 and straw x2oz, and hard solid x4. Patient attempted to self feed, however, has reduced coordination. Therapist assisted with feeding. Adequate bolus formation of solids, slightly slow oral transit, however, adequate oral clearance of all consistencies. Adequate swallow initiation observed without s/s of aspiration.    Patient to be on general diet and thin liquids. No further ST at this time.    RECOMMENDATIONS   Diet Recommendations - Solids: Regular  Diet Recommendations - Liquids: Thin Liquids     Aspiration Precautions: Upright position, Slow rate, Small bites, Small sips  Medication Administration Recommendations: No restrictions       HISTORY   MEDICAL HISTORY  Reason for Referral: R/O aspiration    Problem List  Principal Problem:    Benzodiazepine overdose of undetermined intent, initial encounter      Past Medical History  Past Medical History:    Cancer (HCC)    Skin cancer    Depression    Headache disorder    High blood pressure    High cholesterol    HTN (hypertension)    Hyperlipidemia    Nonspecific elevation of levels of transaminase or lactic acid dehydrogenase (LDH)    s/p liver biopsy normal    Visual impairment    glasses        Diet Prior to Admission: Regular, Thin liquids       Patient/Family Goals: None stated    SWALLOWING HISTORY  Current Diet Consistency: NPO    Dysphagia History: Patient denies history of dysphagia    Imaging Results:   03/22/2025 CXR:  \"Cardiac size and pulmonary  vasculature are within normal limits. No pleural effusions. No pneumothorax.  Small area of sclerosis identified within the proximal right humerus which is incompletely evaluated but could represent an enchondroma or possible avascular necrosis.  If further evaluation is needed, consider dedicated shoulder radiographs.\"      SUBJECTIVE   Patient awake and alert.    OBJECTIVE   ORAL MOTOR EXAMINATION  Dentition: Natural  Symmetry: Within Functional Limits  Strength: Within Functional Limits  Tone: Within Functional Limits  Range of Motion: Overall reduced       Voice Quality: Weak  Respiratory Status: Unlabored  Consistencies Trialed: Thin liquids, Puree, Hard solid  Method of Presentation: Staff/Clinician assistance  Patient Positioned: Upright    Oral Phase of Swallow: Impaired  Bolus Retrieval: Intact  Bilabial Seal: Intact  Bolus Formation: Intact  Bolus Propulsion: Impaired  Mastication: Intact  Retention: Intact    Pharyngeal Phase of Swallow: Within Functional Limits           (Please note: Silent aspiration cannot be evaluated clinically. Videofluoroscopic Swallow Study is required to rule-out silent aspiration.)    Esophageal Phase of Swallow: No complaints consistent with possible esophageal involvement      FOLLOW UP    Thank you for your referral.   If you have any questions, please contact Chayito Mora, SLP

## 2025-03-22 NOTE — ED QUICK NOTES
ACUTE OCCUPATIONAL THERAPY GOALS:   (Developed with and agreed upon by patient and/or caregiver.)  1. Patient will complete lower body bathing and dressing with Min A and adaptive equipment as   needed. 2. Patient will completed upper body bathing and dressing with Mod I and adaptive equipment as needed. 3. Patient will complete grooming standing at sink with CGA and adaptive equipment as needed. 4. Patient will complete toileting with Min A and adaptive equipment as needed. 5. Patient will tolerate 20 minutes of OT treatment OOB with 1-2 rest breaks to increase activity tolerance for ADLs. 6. Patient will complete functional transfers with Min A and adaptive equipment as needed. Timeframe: 7 visits     OCCUPATIONAL THERAPY Initial Assessment, Daily Note, and AM       OT Visit Days: 1  Acknowledge Orders  Time  OT Charge Capture  Rehab Caseload Tracker      Waylon Rouse is a 62 y.o. male   PRIMARY DIAGNOSIS: Cellulitis of right lower extremity  Cellulitis [L03.90]  Cellulitis of right lower extremity [L03.115]  Cellulitis of right lower leg [L03.115]  Venous stasis ulcer of other part of right lower leg with fat layer exposed, unspecified whether varicose veins present ([facility] [I83.018, L97.812]       Reason for Referral: Generalized Muscle Weakness (M62.81)  Other abnormalities of gait and mobility (R26.89)  Repeated Falls (R29.6)  History of falling (Z91.81)  Observation: Payor: [employer] / Plan: Robert Seth [place] / Product Type: *No Product type* /     ASSESSMENT:     REHAB RECOMMENDATIONS:   Recommendation to date pending progress:  Setting:  Short-term Rehab    Equipment:    To Be Determined     ASSESSMENT:  Mr. Yash Araya is a 63 y/o M presenting with cellulitis of RLE. Pt supine and asleep on entry on 2L o2 via NC, A/O x 4. Applicable PMHx: gout, lymphedema, A fib, GERD. Pt reported minimal light headedness and dizziness with positional changes. Pt denied SOB. Pt reports 2 fall(s) in past 6 months.  PLOF Mod No drainage noted from catheter, patient bladder scanned, 771mls retention. Patient catheter removed and replaced with 18fr coude catheter. Catheter draining red urine without any clots. 600mls drained from catheter.    I, living with spouse. DME present in home; high commode. Pt currently Min A with UB ADLs, Max A with LB ADLs, Max A for toileting and Mod A x 2 for functional t/fs and household mobility for ADLs with RW. Session and ADL participation limited d/t increased edema and pain in RLE. 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 Further pt reports non-compliance with wound care has been due to fincial hardship. Pt encouraged to disucss with CM and apply for financial assistance as needed. Rest breaks utilized as needed throughout session. Pt presents with deficits in ADLs, functional t/fs, household mobility for ADLs and activity tolerance compared to reported PLOF. Pt tolerated session fair. Pt presents with increased fatigue and pain in RLE. Pt motivated with good rehab potential. Pt would benefit from continued skilled OT services for duration of hospital stay and after t/f to next level of care or until all stated goals met.       [address] [facility] [address] [address] [facility] 6 Clicks Daily Activity Inpatient Short Form:    AM-PAC Daily Activity Inpatient   How much help for putting on and taking off regular lower body clothing?: A Lot  How much help for Bathing?: A Lot  How much help for Toileting?: A Lot  How much help for putting on and taking off regular upper body clothing?: A Little  How much help for taking care of personal grooming?: A Little  How much help for eating meals?: None  AM-[facility] Inpatient Daily Activity Raw Score: 16  AM-PAC Inpatient ADL T-Scale Score : 35.96  ADL Inpatient CMS 0-100% Score: 53.32  ADL Inpatient CMS G-Code Modifier : CK           SUBJECTIVE:     Mr. Capri Moe states, \"It feels like it's [RLE] about to shatter\"     Social/Functional Lives With: Spouse  Type of Home: House  Home Layout: One level  Home Access: Stairs to enter with rails  Entrance Stairs - Number of Steps: 4  Bathroom Toilet: Standard  Home Equipment:  (none)  ADL Assistance: Independent  Homemaking Assistance: Independent  Ambulation Assistance: Independent  Transfer Assistance: Independent  Active : Yes  Mode of Transportation: Car  Occupation: Full time employment    OBJECTIVE:     LINES / [place] / AIRWAY: Continuous Pulse Oximetry, IV, and Telemetry     RESTRICTIONS/PRECAUTIONS:  Restrictions/Precautions: Fall Risk    PAIN: VITALS / O2:   Pre Treatment:   Pain Assessment: Face, Legs, Activity, Cry, and Consolability (FLACC)  Pain Level: 10  Pain Location: Leg  Pain Orientation: Right  Non-Pharmaceutical Pain Intervention(s): Repositioned;Rest;Nurse notified (comment)      Post Treatment: None reported resting in supine       Vitals          Oxygen            GROSS EVALUATION: INTACT IMPAIRED   (See Comments)   UE AROM [x] []   UE PROM [] []N/T   Strength [] LUE Strength  Gross LUE Strength: WFL  LUE Strength Comment: 3/5  RUE Strength  Gross RUE Strength: WFL  RUE Strength Comment: 3/5     Posture / Balance [] Sitting - Static: Fair, +  Sitting - Dynamic: Fair, +  Standing - Static: Fair  Standing - Dynamic: Fair, -   Sensation []  Pt reports intermittent significant numbness in BLE per pt \"it feels like I'm in roller skates on basketballs\"   Coordination []  Generally decreased     Tone []  N/A     Edema [] Increased edema in BLE< greater in RLE   Activity Tolerance [] Patient limited by fatigue, Patient limited by pain     Hand Dominance R [x] L []      COGNITION/  PERCEPTION: INTACT IMPAIRED   (See Comments)   Orientation [x]     Vision [x]     Hearing [x]     Cognition  [x]     Perception []       MOBILITY: I Mod I S SBA CGA Min Mod Max Total  NT x2 Comments:   Bed Mobility    Rolling [] [] [] [] [] [] [x] [] [] [] [x]    Supine to Sit [] [] [] [] [] [] [x] [] [] [] [x]    Scooting [] [] [] [] [] [] [x] [] [] [] [x]    Sit to Supine [] [] [] [] [] [] [x] [] [] [] [x]    Transfers    Sit to Stand [] [] [] [] [] [] [x] [] [] [] [x]    Bed to Chair [] [] [] [] [] [] [] [] [] [x] []    Stand to Sit [] [] [] [] [] [] [x] [] [] [] [x]    Tub/Shower [] [] [] [] [] [] [] [] [] [x] []     Toilet [] [] [] [] [] [] [] [] [] [x] []      [] [] [] [] [] [] [] [] [] [] []    I=Independent, Mod I=Modified Independent, S=Supervision/Setup, SBA=Standby Assistance, CGA=Contact Guard Assistance, Min=Minimal Assistance, Mod=Moderate Assistance, Max=Maximal Assistance, Total=Total Assistance, NT=Not Tested    ACTIVITIES OF DAILY LIVING: I Mod I S SBA CGA Min Mod Max Total NT Comments   BASIC ADLs:              Upper Body Bathing  [] [] [] [] [] [x] [] [] [] []    Lower Body Bathing [] [] [] [] [] [] [] [x] [] []    Toileting [] [] [] [] [] [] [] [x] [] []    Upper Body Dressing [] [] [] [] [] [x] [] [] [] []    Lower Body Dressing [] [] [] [] [] [] [] [x] [] [] Increased time attempted donning of sock and shoe   Feeding [] [x] [] [] [] [] [] [] [] []    Grooming [] [] [] [] [x] [] [] [] [] []    Personal Device Care [] [] [] [] [] [] [] [] [] [x]    Functional Mobility [] [] [] [] [] [] [] [x] [] []    I=Independent, Mod I=Modified Independent, S=Supervision/Setup, SBA=Standby Assistance, CGA=Contact Guard Assistance, Min=Minimal Assistance, Mod=Moderate Assistance, Max=Maximal Assistance, Total=Total Assistance, NT=Not Tested    PLAN:   FREQUENCY/DURATION   OT Plan of Care: 3 times/week for duration of hospital stay or until stated goals are met, whichever comes first.    PROBLEM LIST:   (Skilled intervention is medically necessary to address:)  Decreased ADL/Functional Activities  Decreased Activity Tolerance  Decreased Balance  Decreased Coordination  Decreased Gait Ability  Decreased Strength  Decreased Transfer Abilities  Increased Pain   INTERVENTIONS PLANNED:  (Benefits and precautions of occupational therapy have been discussed with the patient.)  Self Care Training  Therapeutic Activity  Therapeutic Exercise/HEP  Neuromuscular Re-education  Manual Therapy  Education         TREATMENT:     EVALUATION: MODERATE COMPLEXITY: (Untimed Charge)    TREATMENT:   Co-Treatment PT/OT necessary due to patient's decreased overall endurance/tolerance levels, as well as need for high level skilled assistance to complete functional transfers/mobility and functional tasks  Neuromuscular Re-education (15 Minutes): Neuromuscular Re-education included Balance Training, Coordination training, Functional mobility with facilitation, Postural training, Sitting balance training, and Standing balance training to improve Balance, Coordination, Functional Mobility, and Postural Control. Self Care (8 minutes): Patient participated in lower body dressing ADLs in unsupported sitting and standing with minimal verbal cueing to increase independence, decrease assistance required, and increase activity tolerance. Patient also participated in functional mobility, functional transfer, and energy conservation training to increase independence, decrease assistance required, and increase activity tolerance.      TREATMENT GRID:  N/A    AFTER TREATMENT PRECAUTIONS: Bed, Bed/Chair Locked, Call light within reach, Needs within reach, and RN at bedside    INTERDISCIPLINARY COLLABORATION:  RN/ PCT, PT/ PTA, and OT/ CAMPBELL    EDUCATION:  Education Given To: Patient  Education Provided: Role of Therapy;Plan of Care;Energy Conservation  Education Method: Verbal  Barriers to Learning: None  Education Outcome: Verbalized understanding;Continued education needed    TOTAL TREATMENT DURATION AND TIME:  Time In: [address] [facility]  Time Out: [place]  Minutes: Galen Morgan OT

## 2025-03-22 NOTE — BH LEVEL OF CARE ASSESSMENT
Crisis Evaluation Assessment    Robe Nunez YOB: 1950   Age 74 year old MRN [id number]   [facility] EMERGENCY DEPARTMENT Attending Tyler Castillo MD      Patient's legal sex: male  Patient identifies as: male  Patient's birth sex: male  Preferred pronouns: He/Him    Date of Service: 3/21/2025    Referral Source:  Referral Source  Where was crisis eval performed?: On-site  Referral Source: Friend/Relative  Referral Source Info: Pt's family called 911 who reported patient to be found unresponsive    Reason for Crisis Evaluation   Patient presents to the ER after he was found at home by his wife with empty pill bottles, and patient appearing drowsy and disoriented.  Per family, patient has a hx of a suicide attempt >20 years via cutting.        Collateral  Pt's daughter's and wife: Patient's family reports patient has been increasingly depressed since October when he had a medical issue and had to be on a catheter.  Patient's family reports he is no longer on the catheter but that is when they noticed his mental health started to decline.  Patient's family reports patient had been in recovery from his depression for over 30 years and they are considering this a relapse of his mental health.  Patient's family reports back in November they noticed is declining and that is when they took him to [facility] for inpatient hospitalization.  Patient's family reports since then he continues to show no signs of improvement and if anything he appears worse.  Patient's family reports patient keeps to himself and does not disclose anything but his body language speaks otherwise which is how they can tell that he is still depressed.  Patient family reports he isolates where he is in his room or laying around a lot and does not engage in conversations.  Patient's family reports he just overall seems to have a low affect and not interested in anything anymore.  Patient's family reports he had ECT in the  past but he quit on his own but due to his worsening depression they actually had just rescheduled for her to restart on March 26.  Patient's family reports patient's wife left this morning around 8 AM and when she returned home around 5 that is when she saw him in his room unresponsive with empty bottles around him.  Patient's mother reports she called 911 as she could figure he definitely overdosed and is in agreement with patient requiring treatment.  Patient's wife reports she had control of all of his medications until about a week ago, reporting she felt that it had been enough time where she was managing his medications and she wanted to give him the benefit of the doubt to manage his own meds and to trust him however she feels guilty about this now.        Suicide Crisis Syndrome:  Suicide Crisis Syndrome  Do you feel trapped with no good options left?: Yes  Are you overwhelmed, or have you lost control by negative thoughts filling your head? : Yes    Suicide Risk Screening:  Source of information for CSSR: Collateral  In what setting is the screener performed?: in person  1. Have you wished you were dead or wished you could go to sleep and not wake up? (past 30 days): Yes  2. Have you actually had any thoughts of killing yourself? (past 30 days): Yes  3. Have you been thinking about how you might kill yourself? (past 30 days): Yes (Patient overdosed on medications)  4. Have you had these thoughts and had some intention of acting on them? (past 30 days): Yes (Patient intentionally overdosed today)  5a. Have you started to work out or worked out the details of how to kill yourself? (past 30 days): Yes (Patient intentionally ingested all prescribed medication)  5b. Do you intend to carry out this plan? (past 30 days): Yes (Patient attempted today)  6. Have you ever done anything, started to do anything, or prepared to do anything to end your life? (lifetime): Yes (Prior attempt in the 1990's via cutting)  7.  How long ago did you do any of these?: Within the last three months (Today's overdose)  Score -  OV: 13 - High Risk     Suicide Risk Assessments:  Suicidal Thoughts, Plan and Intent (this information to be used in conjunction with CSSR-S Suicide Screening)  Describe thoughts, ideation and intent:: Patient's family report patient began declining in his mental health around October.  Patient's family report they took patient to [facility] for admission in November due to noticing his decline.  Patient's family report continuing to notice patient declining in motivation, interest in activities, and overall demeanor to the point where they [place] [facility] [place].  Frequency: How many times have you had these thoughts?: Many times each day  Duration: When you have the thoughts, how long do they last?: More than 8 hours/ persistent or continuous  Controllability: Could/can you stop thinking about killing yourself or wanting to die if you want to?: Does not attempt to control thoughts  Identify Risk Factors  Do you have access to lethal methods to attempt suicide?: Yes  Describe access to lethal methods: Household items, medications  Clinical Status:: Hopelessness, Major depressive episode, Agitation or severe anxiety, Insomnia  Activating Events/Recent Stressors:: Significant negative event(s) (legal, financial, relationship, etc.), Current or pending isolation or feeling alone  Identify Protective Factors  Internal: Other (comment) (Declines)  External: Other (comment) (Declines)  Risk Stratification  Risk Level: High  Active SI: Pt presents after an intentional overdose of prescribed medications.  Helpless/Hopeless/Worthless: Pt reports experiencing all 3.  Significant losses: Family deny.  Stressors: Pt family reports overall increased uncontrollable depression and loss of enjoyment in life.        Non-Suicidal Self-Injury:   Pt denies.  Pt reports a suicide attempt >20 years ago via cutting his  wrists.        Risk to Others  Aggression/Agitation: Pt denies.  Destruction of property: Pt denies.  HI: Pt denies.        Access to Means:  Access to Means  Has access to means to attempt suicide, self-injure, harm others, or damage property?: Yes  Description of Access: Medications, Household items  Discussion of Removal of Access to Means: To be discussed at discharge  Access to Firearm/Weapon: No  Do you have a firearm owner identification (FOID) card?: No        Protective Factors:   Pt's family.        Review of Psychiatric Systems:  Depression: Pt's family report chronic depression hx with hx of ECT treatment, most recent in 01/2025.  Patient's family reports increasing depressive symptoms including decreased motivation, loss of interest in activities, low energy, low mood, isolation.  Anxiety: Patient's family reports overall generalized anxiety.  Patient's family denies any panic attacks.  Psychosis: Pt denies.  Sleep: Pt reports he is dx with insomnia and often struggles with sleep.  Pt's family reports pt has always struggled with sleep and typically gets anywhere from 3-5 hours, reporting he struggles to fall asleep and his sleep is broken.  Appetite: Pt's family report a decline in pt's appetite, reporting this started in October.  Pt's family report they are certain pt has lost weight over the past half year due to his poor appetite, reporting a meal a day.        Substance Use:  Pt denies.   Withdrawal Symptoms  Current Withdrawal Symptoms: No        Functional Achievement:   Home: Pt is able to perform own ADL's.  Pt's family report a decline in caring for self, reporting he has not been showering as much and has been isolating, keeping to himself, and presents with a low affect.  Pt's family report pt can be found laying around most days, not being as conversational.  Work: Pt family deny pt working.        Ability to Care for Self::   See above.        Current Treatment and Treatment  History:  Prior diagnoses:  -MDD  -ONEIDA  -Insomnia    Inpatient hx:  -1990's~2x~[facility]  -[facility]~11/2024~SI    Outpatient hx:  -Pt denies    Therapist:  -[facility]~Bi-Weekly sessions~4 total sessions    Psychiatrist:  -[facility]~Dr. Reyes~Most recent visit 03/13/2025    Medications:  -See med list.  Pt was complaint until today due to intentional overdose.        School/Work Performance:  See above.        Relevant Social History:  Family hx: Pt family denies  Trauma/Abuse hx: Pt family denies.  Marital status/Children: Pt reports he is , 3 adult children.  Living situation: Pt resides at home with his wife.  Legal hx: Pt denies.        Baron and Complex (as applicable):  Geriatric Functioning  Dementia Symptoms Observed/Expressed: No  Current Living Situation  Current Living Situation: Private Residence  [place] [employer] [place]  Is the patient verbal?: Yes  Vision or hearing correction?: No  Patient able to understand and follow directions?: Yes  Patient able to express needs?: Yes  Feeding and Swallowing  History of aspiration or choking?: No  Feeding assistance needed?: No  Patient have any chewing or swallowing problems?: No  Special Diet: No  Mobility/Activity & Assistive Devices  Current/recent injuries or surgeries that affect mobility?: No  Physical Limitations Present: None  Independent in ambulation?: Yes  Transfer Assist: No Assistance Needed  Assistive Device Used:: Walker  History of falls?: Yes  When was the most recent fall?: \"Months ago\"-Patient's family report if anything patient reports feeling dizzy at times  How often has the patient fallen in the last month?: 0  Grooming  Level of independence in dressing: Fully Independent  Level of independence to shower/bathe/wash: Fully Independent  Level of independence in personal grooming tasks (e.g., brushing teeth, brushing hair, applying hearing aids, shaving, etc.): Fully Independent  Toileting  Patient Incontinence: Bladder (Uses a depends for  precaution, not fully incontinent)  Special Considerations  Patient has pressures sores, surgical wounds, bandages/dressings?: No  Patient uses any kind of pump?: No  Intellectual disability reported?  Intellectual Disability?: No  Reported Social/Emotional Functioning Level  Describe: age appropriate      Current Medical (as applicable):  Current Medical  Medical Problems Under Current Treatment that will need to be continued after psychiatric admission: See H&P  Do you have a Primary Care Physician?: Yes  Primary Care Physician Name: Mango Boland MD  Does the Patient Have: None  Active Eating Disorder: No    EDP Assessment (as applicable):  IBW Calculations  Weight: 170 lb  BMI (Calculated): 26.6  IBW LBS [place]: 148 LBS  IBW %: 114.86 %  IBW + 10%: 162.8 LBS  IBW - 10%: 133.2 LBS    Abuse Assessment:  Abuse Assessment  Physical Abuse: Denies  Verbal Abuse: Denies  Sexual Abuse: Denies  Neglect: Denies  Does anyone say or do something to you that makes you feel unsafe?: No  Have You Ever Been Harmed by a Partner/Caregiver?: No  Health Concerns r/t Abuse: No  Possible Abuse Reportable to:: Not appropriate for reporting to authorities    Mental Status Exam:   General Appearance  Characteristics: Good hygiene, Appropriate clothing  Eye Contact: Other (comment) (Disorented stare)  Psychomotor Behavior  Gait/Movement: Coordinated, Steady, Normal  Abnormal movements: None  Posture: Stooped  Rate of Movement: Slow (Currently due to level of orientation from medications)  Mood and Affect  Mood or Feelings: Confused  Appropriateness of Affect: Inappropriate to situation, Incongruent to mood  Range of Affect: Flat  Stability of Affect: Stable  Attitude toward staff: Other (comment) (LETICIA due to level of orientation)  Speech  Rate of Speech: Slow (Currently due to level of orientation from medications)  Flow of Speech: Long pauses  Intensity of Volume: Inaudible  Clarity: Mumbled  Cognition  Concentration: Impaired  Memory:  Other (comment) (LETICIA due to level of orientation)  Orientation Level: Oriented X4 (A&O x4 when not disoriented from overdose)  Insight: Poor  Fair/poor insight as evidenced by: Patient presents with a suicide attempt via overdose  Judgment: Poor  Fair/poor judgment as evidenced by: Patient presents with a suicide attempt via overdose  Thought Patterns  Clarity/Relevance: Relevant to topic, Logical, Coherent (When not disoriented from Ativan overdose)  Flow: Organized (When not disoriented from Ativan overdose)  Content: Ordinary  Level of Consciousness: Drowsy  Level of Consciousness: Drowsy  Behavior  Exhibited behavior: Unable to participate      Disposition:  Writer consulted with patient's attending ER physician, Dr. Castillo, who is in agreement with inpatient psychiatric hospitalization due to high risk of harm to self.    Rationale for Treatment Recommendation:   Patient is a 74-year-old male presenting to the ER after an intentional overdose of his medications as a suicide attempt.  Patient arrives drowsy and disoriented therefore patient's family provided collateral for the assessment.  Patient's family reports patient had a suicide attempt via cutting his wrist back in the 90s which required an inpatient hospitalization at [facility].  Patient's family reports the last inpatient hospitalization patient had was in November 2024 due to worsening depression.  Patient's family reports patient was in remission from his depression up until about October 2024 when medical issues started to play out causing patient depression.  Patient's family reports ever since then he has continued to struggle with his depression and his anxiety, along with struggling to sleep and not having an appetite.  Patient's family reports they have been concerned for patient due to his worsening mood to the point where they wanted to get him restarted with ECT this upcoming week.  Patient's family reports in the past patient had ECT  however he stopped going on his own.  Patient's family reports feeling that the medication regimen he is on is not working at all.  Patient's wife reports she was in control of all of his medications until about a week ago, reporting she felt enough time had passed and she felt she could trust him and give him the benefit of the doubt however she now feels very guilty about this.  Patient's wife reports she could not believe what happened and she would have never given him his medications as she suspected he would overdose on them.  Patient's family deny any HI/AVH/SIB.  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  Patient's family denies any dementia symptoms.      Level of Care Recommendations  Consulted with: Dr. Oquendo psychiatrist, Dr. Jim IQBAL  Level of Care Recommendation: Inpatient Acute Care  Unit: A2  Reason for Unit Assigned: age/symptoms  Inpatient Criteria: Suicidal/homicidal risk  Behavioral Precautions: Suicide  Medical Precautions: None, Fall  Refused Treatment: No  Education Provided: Call 911 in an Emergency, [facility] Crisis Line Number, Advised to call if condition worsens, Advised to call with questions  Transferred: No  Sign-In  Paperwork Signed: Patient Rights, Voluntary Admission Form (Signed by POA)  Inpatient Admission Type: Adult Voluntary Signed (Signed by POA)  Patient Provided: Rights of Individuals Receiving MH/DD Services  Patient Verbalized Understanding: Yes      Diagnoses with F-Codes:  Primary Psychiatric Diagnosis  F33.2 Major depressive disorder, recurrent episode, severe without psychotic features     Secondary Psychiatric Diagnoses  F41.1 Generalized anxiety disorder  Pervasive Diagnoses (as applicable)  Deferred  Pertinent Non-Psychiatric Diagnoses  Deferred        Dottie ARENAS LPC

## 2025-03-22 NOTE — ED QUICK NOTES
Orders for admission, patient is aware of plan and ready to go upstairs. Any questions, please call ED RN Stefany at extension [phone].     Patient Covid vaccination status: Fully vaccinated     COVID Test Ordered in ED: SARS-CoV-2 by PCR ([facility])    COVID Suspicion at Admission: N/A    Running Infusions:  None    Mental Status/LOC at time of transport: A/OX3    Other pertinent information:   CIWA score: N/A   NIH score:  N/A

## 2025-03-22 NOTE — H&P
[facility]  History and Physical     Robe Nunez Patient Status:  Observation    1950 MRN [id number]   Location [facility] 3NE-A Attending Francisco J Sharma MD   Hosp Day # 0 PCP Mango Boland MD     Chief Complaint:   Drug overdose    History of Present Illness: Robe Nunez is a 74 year old male with PMH sig for HTN, DL, depression, presents to ED for drug overdose. Wife saw him yest morning. When she got home in the evening pt was drowsy and altered. Ativan and temazepam bottles were empty. Unclear how many he took. Pt states he took them in the morning but cannot remember when. Labs in ED normal. UA neg. Drug screen positive for benzos. CXR neg. Plan was to admit pt to inpt psych but after 12 hours in ED pt still somnolent and per ED admitted to behavioral tele for observation.       Past Medical History:  Past Medical History:    Cancer (HCC)    Skin cancer    Depression    Headache disorder    High blood pressure    High cholesterol    HTN (hypertension)    Hyperlipidemia    Nonspecific elevation of levels of transaminase or lactic acid dehydrogenase (LDH)    s/p liver biopsy normal    Visual impairment    glasses        Past Surgical History:   Past Surgical History:   Procedure Laterality Date    Colonoscopy & polypectomy  2016    diverticulosis and a small polyp was removed; ASC    Colonoscopy,biopsy N/A 2016    Procedure: COLONOSCOPY, POSSIBLE BIOPSY, POSSIBLE POLYPECTOMY 49762;  Surgeon: Shaun Mock MD;  Location: [facility]    Other surgical history      scope knee    Other surgical history  2024    Aquablation of the prostate    Patient documented not to have experienced any of the following events N/A 2016    Procedure: COLONOSCOPY, POSSIBLE BIOPSY, POSSIBLE POLYPECTOMY 80185;  Surgeon: Shaun Mock MD;  Location: [facility]    Patient withough preoperative order for iv antibiotic surgical site infection  prophylaxis. N/A 05/09/2016    Procedure: COLONOSCOPY, POSSIBLE BIOPSY, POSSIBLE POLYPECTOMY 37001;  Surgeon: Shaun Mock MD;  Location: [facility], [facility]    Special service or report  1990s    R knee arthroscopy lat meniscus       Social History:  reports that he has never smoked. He has never used smokeless tobacco. He reports that he does not drink alcohol and does not use drugs.    Family History:   Family History   Problem Relation Age of Onset    Hypertension Father     Diabetes Father     Lipids Brother     Cancer Brother         prostate ca    Other (Other[other]) Sister         ?sle        Allergies: Allergies[1]    Medications:  Medications Ordered Prior to Encounter[2]    Review of Systems:   A comprehensive 14 point review of systems was completed.    Pertinent positives and negatives noted in the HPI.    Physical Exam:    /78 (BP Location: Right arm)   Pulse 88   Temp 98 °F (36.7 °C) (Oral)   Resp 18   Ht 5' 7\" (1.702 m)   Wt 175 lb 8 oz (79.6 kg)   SpO2 98%   BMI 27.49 kg/m²   General:sleepy  HEENT: Normocephalic atraumatic. Moist mucous membranes. EOM-I. PERRLA. Anicteric.  Neck: No lymphadenopathy. No JVD. No carotid bruits.  Respiratory: Clear to auscultation bilaterally. No wheezes. No rhonchi.  Cardiovascular: S1, S2. Regular rate and rhythm. No murmurs, rubs or gallops. Equal pulses.   Chest and Back: No tenderness or deformity.  Abdomen: Soft, nontender, nondistended.  Positive bowel sounds. No rebound, guarding or organomegaly.  Neurologic: No focal neurological deficits. CNII-XII grossly intact.  Musculoskeletal: Moves all extremities.  Extremities: No edema or cyanosis.  Integument: No rashes or lesions.   Psychiatric: Appropriate mood and affect.      Diagnostic Data:      Labs:  Recent Labs   Lab 03/21/25 1904   WBC 6.8   HGB 13.9   MCV 85.9   .0   INR 1.06       Recent Labs   Lab 03/21/25 1904   GLU 93   BUN 16   CREATSERUM 1.15   CA 10.2   ALB 4.3   NA  140   K 4.4      CO2 25.0   ALKPHO 55   AST 17   ALT 14   BILT 0.8   TP 7.0       Estimated Creatinine Clearance: 52.7 mL/min (based on SCr of 1.15 mg/dL).    Recent Labs   Lab 03/21/25  1904   PTP 13.9   INR 1.06       COVID-19 Lab Results    COVID-19  Lab Results   Component Value Date    COVID19 Not Detected 03/21/2025       Pro-Calcitonin  No results for input(s): \"PCT\" in the last 168 hours.    Cardiac  No results for input(s): \"TROP\", \"PBNP\" in the last 168 hours.    Creatinine Kinase  No results for input(s): \"CK\" in the last 168 hours.    Inflammatory Markers  No results for input(s): \"CRP\", \"JOSIAS\", \"LDH\", \"DDIMER\" in the last 168 hours.    No results for input(s): \"TROP\", \"TROPHS\", \"CK\" in the last 168 hours.    Imaging: Imaging data reviewed in Epic.      ASSESSMENT / PLAN:   Robe Nunez is a 74 year old male with PMH sig for HTN, DL, depression, presents to ED for drug overdose.    Drug overdose w/ benzos - intentional, suicide attempt  Depression  -monitor on tele  -suicide precautions  -sitter  -supportive measures  -Psych consult > likely need inpt psych stay    Urinary retention  BPH sp cysto and TURB neck 11/29/24  -connolly placed in ED  -hematuria afterwards  -UA neg  -urology consulted >> apprec recs    HTN  DL  -resume home meds  -monitor on tele          Quality:  DVT Prophylaxis: lovenox, scds  CODE status: full code  Connolly: yes  If COVID testing is negative, may discontinue isolation: yes     Plan of care discussed with patient and RN - all questions answered.        Jenna Serrano MD  Formerly [facility], [facility]1983 Hospitalist  Pager [phone]  Answering Service number: [phone]                            [1]   Allergies  Allergen Reactions    Other UNKNOWN     Hay Fever    Seasonal OTHER (SEE COMMENTS)     Sneezing, watery eyes   [2]   Current Facility-Administered Medications on File Prior to Encounter   Medication Dose Route Frequency Provider Last Rate Last Admin    [COMPLETED] ketorolac (Toradol) 30  MG/ML injection 15 mg  15 mg Intravenous Once Frances Sung MD   15 mg at 25    [COMPLETED] ondansetron (Zofran) 4 MG/2ML injection 8 mg  8 mg Intravenous Once Frances Sung MD   8 mg at 25    [COMPLETED] caffeine-sodium benzoate 125-125 mg/mL injection  750 mg Intravenous Once Frances Sung MD   750 mg at 25    [COMPLETED] hydrALAzine (Apresoline) 20 mg/mL injection 20 mg  20 mg Intravenous Once Frances Sung MD   20 mg at 25    [COMPLETED] ondansetron (Zofran) 4 MG/2ML injection             [] lactated ringers IV bolus 500 mL  500 mL Intravenous Once PRN Walter Garcia MD        [] atropine 0.1 MG/ML injection 0.5 mg  0.5 mg Intravenous PRN Walter Garcia MD        [] naloxone (Narcan) 0.4 MG/ML injection 0.08 mg  0.08 mg Intravenous PRN Walter Garcia MD        [] fentaNYL (Sublimaze) 50 mcg/mL injection 25 mcg  25 mcg Intravenous Q5 Min PRN Walter Garcia MD        Or    [] fentaNYL (Sublimaze) 50 mcg/mL injection 50 mcg  50 mcg Intravenous Q5 Min PRWalter David MD        [] HYDROmorphone (Dilaudid) 1 MG/ML injection 0.2 mg  0.2 mg Intravenous Q5 Min PRN Walter Garcia MD        Or    [] HYDROmorphone (Dilaudid) 1 MG/ML injection 0.4 mg  0.4 mg Intravenous Q5 Min PRWalter David MD        Or    [] HYDROmorphone (Dilaudid) 1 MG/ML injection 0.6 mg  0.6 mg Intravenous Q5 Min PRWalter David MD        [COMPLETED] ketorolac (Toradol) 30 MG/ML injection 15 mg  15 mg Intravenous Once Bryn Taylor MD   15 mg at 01/15/25 0603    [COMPLETED] ondansetron (Zofran) 4 MG/2ML injection 8 mg  8 mg Intravenous Once Bryn Taylor MD   8 mg at 01/15/25 0608    [COMPLETED] caffeine-sodium benzoate 125-125 mg/mL injection  750 mg Intravenous Once Bryn Taylor MD   750 mg at 01/15/25 0630    [COMPLETED] hydrALAzine (Apresoline) 20 mg/mL injection 10 mg  10 mg Intravenous Once Bryn Taylor MD   10 mg at 01/15/25  06    [COMPLETED] ondansetron (Zofran) 4 MG/2ML injection             [COMPLETED] ketamine (Ketalar) 50 MG/ML injection             [] lactated ringers IV bolus 500 mL  500 mL Intravenous Once PRN Joseph Medrano, DO        [] atropine 0.1 MG/ML injection 0.5 mg  0.5 mg Intravenous PRN Mitchell, Joseph, DO        [] naloxone (Narcan) 0.4 MG/ML injection 0.08 mg  0.08 mg Intravenous PRN Mitchell, Joseph, DO        [] fentaNYL (Sublimaze) 50 mcg/mL injection 25 mcg  25 mcg Intravenous Q5 Min PRN Mitchell, Joseph, DO        Or    [] fentaNYL (Sublimaze) 50 mcg/mL injection 50 mcg  50 mcg Intravenous Q5 Min PRN Mitchell, Joseph, DO        [] HYDROmorphone (Dilaudid) 1 MG/ML injection 0.2 mg  0.2 mg Intravenous Q5 Min PRN Julia Medranoed, DO        Or    [] HYDROmorphone (Dilaudid) 1 MG/ML injection 0.4 mg  0.4 mg Intravenous Q5 Min PRN Joseph Medrano, DO        Or    [] HYDROmorphone (Dilaudid) 1 MG/ML injection 0.6 mg  0.6 mg Intravenous Q5 Min PRN Joseph Medrano, DO        [COMPLETED] ketorolac (Toradol) 30 MG/ML injection 30 mg  30 mg Intravenous Once Bryn Taylor MD   30 mg at 01/10/25 06    [COMPLETED] ondansetron (Zofran) 4 MG/2ML injection 8 mg  8 mg Intravenous Once Bryn Taylor MD   8 mg at 01/10/25 06    [COMPLETED] caffeine-sodium benzoate 125-125 mg/mL injection  750 mg Intravenous Once Bryn Taylor MD   750 mg at 01/10/25 06    [COMPLETED] hydrALAzine (Apresoline) 20 mg/mL injection 10 mg  10 mg Intravenous Once Bryn Taylor MD   10 mg at 01/10/25 0603    [COMPLETED] caffeine-sodium benzoate 125-125 MG/ML injection             [COMPLETED] ondansetron (Zofran) 4 MG/2ML injection             [COMPLETED] caffeine-sodium benzoate 125-125 MG/ML injection             [] lactated ringers IV bolus 500 mL  500 mL Intravenous Once PRN La LigaJuan Hoffmann MD        [] atropine 0.1 MG/ML injection 0.5 mg  0.5 mg Intravenous PRN La LigaJuan Hoffmann,  MD        [] naloxone (Narcan) 0.4 MG/ML injection 0.08 mg  0.08 mg Intravenous PRN [place] Peak, Juan Scott MD        [] fentaNYL (Sublimaze) 50 mcg/mL injection 25 mcg  25 mcg Intravenous Q5 Min PRN [place] Peak, Juan Scott MD        Or    [] fentaNYL (Sublimaze) 50 mcg/mL injection 50 mcg  50 mcg Intravenous Q5 Min PRN [place] Peak, Juan Scott MD        [] HYDROmorphone (Dilaudid) 1 MG/ML injection 0.2 mg  0.2 mg Intravenous Q5 Min PRN Silver Peak, Juan Scott MD        Or    [] HYDROmorphone (Dilaudid) 1 MG/ML injection 0.4 mg  0.4 mg Intravenous Q5 Min PRN [place] Peak, Juan Scott MD        Or    [] HYDROmorphone (Dilaudid) 1 MG/ML injection 0.6 mg  0.6 mg Intravenous Q5 Min PRN Silver Peak, Juan Scott MD        [COMPLETED] ketorolac (Toradol) 30 MG/ML injection 15 mg  15 mg Intravenous Once Bryn Taylor MD   15 mg at 25    [COMPLETED] caffeine-sodium benzoate 125-125 mg/mL injection  750 mg Intravenous Once Bryn Taylor MD   750 mg at 25    [COMPLETED] ondansetron (Zofran) 4 MG/2ML injection 8 mg  8 mg Intravenous Once Bryn Taylor MD   4 mg at 25    [COMPLETED] hydrALAzine (Apresoline) 20 mg/mL injection 10 mg  10 mg Intravenous Once Bryn Taylor MD   10 mg at 25    [COMPLETED] ondansetron (Zofran) 4 MG/2ML injection             [COMPLETED] ketorolac (Toradol) 30 MG/ML injection             [COMPLETED] caffeine-sodium benzoate 125-125 MG/ML injection             [COMPLETED] hydrALAzine (Apresoline) 20 mg/mL injection             [COMPLETED] ketamine (Ketalar) 50 MG/ML injection             [] lactated ringers IV bolus 500 mL  500 mL Intravenous Once PRN Wiliam Rocha MD        [] atropine 0.1 MG/ML injection 0.5 mg  0.5 mg Intravenous PRN Wiliam Rocha MD        [] naloxone (Narcan) 0.4 MG/ML injection 0.08 mg  0.08 mg Intravenous PRN Wiliam Rocha MD        [] fentaNYL (Sublimaze) 50 mcg/mL  injection 25 mcg  25 mcg Intravenous Q5 Min PRN Wiliam Rocha MD        Or    [] fentaNYL (Sublimaze) 50 mcg/mL injection 50 mcg  50 mcg Intravenous Q5 Min PRN Wiliam Rocha MD        [] HYDROmorphone (Dilaudid) 1 MG/ML injection 0.2 mg  0.2 mg Intravenous Q5 Min PRN Wiliam Rocha MD        Or    [] HYDROmorphone (Dilaudid) 1 MG/ML injection 0.4 mg  0.4 mg Intravenous Q5 Min PRN Wiliam Rohca MD        Or    [] HYDROmorphone (Dilaudid) 1 MG/ML injection 0.6 mg  0.6 mg Intravenous Q5 Min PRN Wiliam Rocha MD        [COMPLETED] caffeine-sodium benzoate 125-125 MG/ML injection             [] lactated ringers IV bolus 500 mL  500 mL Intravenous Once PRN Isamar Thomas MD        [] atropine 0.1 MG/ML injection 0.5 mg  0.5 mg Intravenous PRN Isamar Thomas MD        [] fentaNYL (Sublimaze) 50 mcg/mL injection 25 mcg  25 mcg Intravenous Q5 Min PRN Isamar Thomas MD        Or    [] fentaNYL (Sublimaze) 50 mcg/mL injection 50 mcg  50 mcg Intravenous Q5 Min PRN Isamar Thomas MD        [] acetaminophen (Tylenol Extra Strength) tab 1,000 mg  1,000 mg Oral Once PRN Isamar Thomas MD        [] labetalol (Trandate) 5 mg/mL injection 5 mg  5 mg Intravenous Q5 Min PRN Isamar Thomas MD        [] midazolam (Versed) 2 MG/2ML injection 1 mg  1 mg Intravenous Q5 Min PRN Isamar Thomas MD        [] diphenhydrAMINE (Benadryl) 50 mg/mL  injection 12.5 mg  12.5 mg Intravenous PRN Isamar Thomas MD        [COMPLETED] ketorolac (Toradol) 30 MG/ML injection 15 mg  15 mg Intravenous Once Bryn Taylor MD   15 mg at 25 0634    [COMPLETED] ondansetron (Zofran) 4 MG/2ML injection 8 mg  8 mg Intravenous Once Bryn Taylor MD   8 mg at 25 0630    [COMPLETED] caffeine-sodium benzoate 125-125 mg/mL injection  500 mg Intravenous Once Bryn Taylor MD   500 mg at 25 0645    [COMPLETED] hydrALAzine  (Apresoline) 20 mg/mL injection 10 mg  10 mg Intravenous Once Bryn Taylor MD   10 mg at 25 0640    [COMPLETED] ondansetron (Zofran) 4 MG/2ML injection             [COMPLETED] ketorolac (Toradol) 30 MG/ML injection             [COMPLETED] caffeine-sodium benzoate 125-125 MG/ML injection             [COMPLETED] hydrALAzine (Apresoline) 20 mg/mL injection             [COMPLETED] ketorolac (Toradol) 30 MG/ML injection 15 mg  15 mg Intravenous Once Bryn Taylor MD   15 mg at 25 0608    [COMPLETED] ondansetron (Zofran) 4 MG/2ML injection 8 mg  8 mg Intravenous Once Bryn Taylor MD   8 mg at 25 0605    [COMPLETED] caffeine-sodium benzoate 125-125 mg/mL injection  500 mg Intravenous Once Bryn Taylor MD   500 mg at 25 0653    [COMPLETED] hydrALAzine (Apresoline) 20 mg/mL injection 10 mg  10 mg Intravenous Once Bryn Taylor MD   10 mg at 25 0603    [COMPLETED] ondansetron (Zofran) 4 MG/2ML injection             [COMPLETED] ketorolac (Toradol) 30 MG/ML injection             [COMPLETED] caffeine-sodium benzoate 125-125 MG/ML injection             [COMPLETED] hydrALAzine (Apresoline) 20 mg/mL injection             [] lactated ringers IV bolus 500 mL  500 mL Intravenous Once PRN Mitchell, Joseph, DO        [] atropine 0.1 MG/ML injection 0.5 mg  0.5 mg Intravenous PRN Julia Medranoed, DO        [] naloxone (Narcan) 0.4 MG/ML injection 0.08 mg  0.08 mg Intravenous PRN Julia Medranoed, DO        [] fentaNYL (Sublimaze) 50 mcg/mL injection 25 mcg  25 mcg Intravenous Q5 Min PRN Joseph Medrano, DO        Or    [] fentaNYL (Sublimaze) 50 mcg/mL injection 50 mcg  50 mcg Intravenous Q5 Min PRN Joseph Medrano, DO        [] HYDROmorphone (Dilaudid) 1 MG/ML injection 0.2 mg  0.2 mg Intravenous Q5 Min PRN Joseph Medrano, DO        Or    [] HYDROmorphone (Dilaudid) 1 MG/ML injection 0.4 mg  0.4 mg Intravenous Q5 Min PRN Mitchell, Joseph, DO        Or    []  HYDROmorphone (Dilaudid) 1 MG/ML injection 0.6 mg  0.6 mg Intravenous Q5 Min PRN Joseph Medrano DO        [COMPLETED] ketorolac (Toradol) 30 MG/ML injection 15 mg  15 mg Intravenous Once Frances Sung MD   15 mg at 24 06    [COMPLETED] ondansetron (Zofran) 4 MG/2ML injection 8 mg  8 mg Intravenous Once Frances Sung MD   8 mg at 24 06    [COMPLETED] caffeine-sodium benzoate 125-125 mg/mL injection  500 mg Intravenous Once Frances Sung MD   500 mg at 24 06    [COMPLETED] hydrALAzine (Apresoline) 20 mg/mL injection 10 mg  10 mg Intravenous Once Frances Sung MD   10 mg at 24    [] lactated ringers IV bolus 500 mL  500 mL Intravenous Once PRN Francis Carranza MD        [] atropine 0.1 MG/ML injection 0.5 mg  0.5 mg Intravenous PRN Francis Carranza MD        [] naloxone (Narcan) 0.4 MG/ML injection 80 mcg  80 mcg Intravenous PRN Francis Carranza MD        [] fentaNYL (Sublimaze) 50 mcg/mL injection 25 mcg  25 mcg Intravenous Q5 Min PRN Francis Carranza MD        Or    [] fentaNYL (Sublimaze) 50 mcg/mL injection 50 mcg  50 mcg Intravenous Q5 Min PRN Francis Carranza MD        [] HYDROmorphone (Dilaudid) 1 MG/ML injection 0.2 mg  0.2 mg Intravenous Q5 Min PRN Francis Carranza MD        Or    [] HYDROmorphone (Dilaudid) 1 MG/ML injection 0.4 mg  0.4 mg Intravenous Q5 Min PRN Francis Carranza MD        Or    [] HYDROmorphone (Dilaudid) 1 MG/ML injection 0.6 mg  0.6 mg Intravenous Q5 Min PRN Francis Carranza MD        [COMPLETED] ketorolac (Toradol) 30 MG/ML injection 15 mg  15 mg Intravenous Once Frances Sung MD   15 mg at 24 0559    [COMPLETED] ondansetron (Zofran) 4 MG/2ML injection 4 mg  4 mg Intravenous Once Frances Sung MD   4 mg at 24 0600    [COMPLETED] caffeine-sodium benzoate 125-125 mg/mL injection  500 mg Intravenous Once Frances Sung MD   500 mg at 24 0621    [COMPLETED] hydrALAzine (Apresoline) 20  mg/mL injection 10 mg  10 mg Intravenous Once Frances Sung MD   10 mg at 24 0620    [COMPLETED] ondansetron (Zofran) 4 MG/2ML injection             [COMPLETED] ketorolac (Toradol) 30 MG/ML injection             [COMPLETED] caffeine-sodium benzoate 125-125 MG/ML injection             [COMPLETED] hydrALAzine (Apresoline) 20 mg/mL injection             [] lactated ringers IV bolus 500 mL  500 mL Intravenous Once PRN Sahil Gilmore MD        [] acetaminophen (Tylenol Extra Strength) tab 1,000 mg  1,000 mg Oral Once PRN Sahil Gilmore MD        Or    [] HYDROcodone-acetaminophen (Norco) 5-325 MG per tab 1 tablet  1 tablet Oral Once PRN Sahil Gilmore MD        Or    [] HYDROcodone-acetaminophen (Norco) 5-325 MG per tab 2 tablet  2 tablet Oral Once PRN Sahil Gilmore MD        [] atropine 0.1 MG/ML injection 0.5 mg  0.5 mg Intravenous PRN Sahil Gilmore MD        [] labetalol (Trandate) 5 mg/mL injection 5 mg  5 mg Intravenous Q5 Min PRN Sahil Gilmore MD        [] naloxone (Narcan) 0.4 MG/ML injection 0.08 mg  0.08 mg Intravenous PRN Sahil Gilmore MD        [] fentaNYL (Sublimaze) 50 mcg/mL injection 25 mcg  25 mcg Intravenous Q5 Min PRN Sahil Gilmore MD        Or    [] fentaNYL (Sublimaze) 50 mcg/mL injection 50 mcg  50 mcg Intravenous Q5 Min PRN Sahil Gilmore MD        [] HYDROmorphone (Dilaudid) 1 MG/ML injection 0.2 mg  0.2 mg Intravenous Q5 Min PRN Sahil Gilmore MD        Or    [] HYDROmorphone (Dilaudid) 1 MG/ML injection 0.4 mg  0.4 mg Intravenous Q5 Min PRN Sahil Gilmore MD        Or    [] HYDROmorphone (Dilaudid) 1 MG/ML injection 0.6 mg  0.6 mg Intravenous Q5 Min PRN Sahil Gilmore MD        [] midazolam (Versed) 2 MG/2ML injection 1 mg  1 mg Intravenous Q5 Min PRN Sahil Gilmore MD        [COMPLETED] ondansetron (Zofran) 4 MG/2ML injection             [COMPLETED] ketorolac (Toradol) 30 MG/ML  injection 15 mg  15 mg Intravenous Once Frances Sung MD   15 mg at 24 0635    [COMPLETED] ondansetron (Zofran) 4 MG/2ML injection 8 mg  8 mg Intravenous Once Frances Sung MD   8 mg at 24 0633    [COMPLETED] caffeine-sodium benzoate 125-125 mg/mL injection  500 mg Intravenous Once Frances Sung MD   500 mg at 24 0703    [COMPLETED] hydrALAzine (Apresoline) 20 mg/mL injection 10 mg  10 mg Intravenous Once Frances Sung MD   10 mg at 24 0704    [] lactated ringers IV bolus 500 mL  500 mL Intravenous Once PRN Wiliam Rocha MD        [] atropine 0.1 MG/ML injection 0.5 mg  0.5 mg Intravenous PRN Wiliam Rocha MD        [] naloxone (Narcan) 0.4 MG/ML injection 0.08 mg  0.08 mg Intravenous PRN Wiliam Rocha MD        [] fentaNYL (Sublimaze) 50 mcg/mL injection 25 mcg  25 mcg Intravenous Q5 Min PRN Wiliam Rocha MD        Or    [] fentaNYL (Sublimaze) 50 mcg/mL injection 50 mcg  50 mcg Intravenous Q5 Min PRN Wiliam Rocha MD        [] HYDROmorphone (Dilaudid) 1 MG/ML injection 0.2 mg  0.2 mg Intravenous Q5 Min PRN Wiliam Rocha MD        Or    [] HYDROmorphone (Dilaudid) 1 MG/ML injection 0.4 mg  0.4 mg Intravenous Q5 Min PRN Wiliam Rocha MD        Or    [] HYDROmorphone (Dilaudid) 1 MG/ML injection 0.6 mg  0.6 mg Intravenous Q5 Min PRN Wiliam Rocha MD        [COMPLETED] ketorolac (Toradol) 30 MG/ML injection 15 mg  15 mg Intravenous Once Frances Sung MD   15 mg at 24 0627    [COMPLETED] ondansetron (Zofran) 4 MG/2ML injection 4 mg  4 mg Intravenous Once Frances Sung MD   4 mg at 24    [COMPLETED] caffeine-sodium benzoate 125-125 mg/mL injection  250 mg Intravenous Once Frances Sung MD   250 mg at 24    [COMPLETED] hydrALAzine (Apresoline) 20 mg/mL injection 10 mg  10 mg Intravenous Once Frances Sung MD   10 mg at 24    [] lactated ringers IV  bolus 500 mL  500 mL Intravenous Once PRN Francis Carranza MD        [] atropine 0.1 MG/ML injection 0.5 mg  0.5 mg Intravenous PRN Francis Carranza MD        [] naloxone (Narcan) 0.4 MG/ML injection 80 mcg  80 mcg Intravenous PRN Francis Carranza MD        [] fentaNYL (Sublimaze) 50 mcg/mL injection 25 mcg  25 mcg Intravenous Q5 Min PRN Francis Carranza MD        Or    [] fentaNYL (Sublimaze) 50 mcg/mL injection 50 mcg  50 mcg Intravenous Q5 Min PRN Francis Carranza MD        [] HYDROmorphone (Dilaudid) 1 MG/ML injection 0.2 mg  0.2 mg Intravenous Q5 Min PRN Francis Carranza MD        Or    [] HYDROmorphone (Dilaudid) 1 MG/ML injection 0.4 mg  0.4 mg Intravenous Q5 Min PRN Francis Carranza MD        Or    [] HYDROmorphone (Dilaudid) 1 MG/ML injection 0.6 mg  0.6 mg Intravenous Q5 Min PRFrancis Zazueta MD         Current Outpatient Medications on File Prior to Encounter   Medication Sig Dispense Refill    temazepam 15 MG Oral Cap Take 1 capsule (15 mg total) by mouth nightly. 30 capsule 0    DULoxetine (CYMBALTA) 60 MG Oral Cap DR Particles Take 1 capsule (60 mg total) by mouth daily. 30 capsule 2    QUEtiapine (SEROQUEL) 50 MG Oral Tab Take 1 tablet (50 mg total) by mouth nightly. 30 tablet 2    PROBIOTIC PRODUCT OR Take 1 capsule by mouth every morning.      pravastatin 40 MG Oral Tab Take 1 tablet (40 mg total) by mouth nightly.      metoprolol succinate ER 25 MG Oral Tablet 24 Hr Take 1 tablet (25 mg total) by mouth daily.      Lisinopril-hydroCHLOROthiazide 10-12.5 MG Oral Tab Take 1 tablet by mouth once daily. 90 tablet 3    Cholecalciferol (VITAMIN D) 2000 UNITS Oral Cap Take by mouth daily.      LORazepam 0.5 MG Oral Tab Take 1 tablet (0.5 mg total) by mouth daily as needed for Anxiety. (Patient not taking: Reported on 3/22/2025) 30 tablet 1    Multiple Vitamins-Minerals (MULTI-VITAMIN/MINERALS) Oral Tab Take 1 tablet by mouth daily. (Patient not taking: Reported  on 3/22/2025)

## 2025-03-22 NOTE — ED PROVIDER NOTES
Patient Seen in: [facility] Emergency Department      History     Chief Complaint   Patient presents with    Eval-P    Overdose    Poisoning/Overdose     Stated Complaint: pt told ems he took ativan pills x 20 around 1730 -- hx si 20 yrs ago  -- wife *    Subjective:   HPI      74-year-old man presents for overdose.  Wife last saw him at 8 AM.  Upon getting home at 6:30 PM found him drowsy and altered.  Ativan and temazepam pill bottles were empty.  Unclear how many were taken.  Patient states he took them sometime this morning but does not know what time specifically he does not answer why he took all these medications at once.  He denies any other ingestions.  History very limited    Objective:     No pertinent past medical history.            No pertinent past surgical history.              No pertinent social history.                Physical Exam     ED Triage Vitals [03/21/25 1859]   /82   Pulse 79   Resp 24   Temp 97.6 °F (36.4 °C)   Temp src Oral   SpO2 100 %   O2 Device None (Room air)       Current Vitals:   Vital Signs  BP: (!) 161/81  Pulse: 70  Resp: 16  Temp: 97.6 °F (36.4 °C)  Temp src: Oral  MAP (mmHg): (!) 105    Oxygen Therapy  SpO2: 100 %  O2 Device: None (Room air)  ETCO2 (mmHg): 33 mmHg        Physical Exam  Constitutional:       General: Drowsy but arousable to verbal stimuli  HENT:      Head: Normocephalic and atraumatic.      Mouth/Throat:      Mouth: Mucous membranes are moist.   Eyes:      Conjunctiva/sclera: Conjunctivae normal.      Pupils: Pupils are equal, round, and reactive to light.   Cardiovascular:      Rate and Rhythm: Normal rate and regular rhythm.   Pulmonary:      Effort: Pulmonary effort is normal. No respiratory distress.      Breath sounds: Normal breath sounds.   Abdominal:      General: Abdomen is flat. There is no distension.      Tenderness: There is no abdominal tenderness.   Musculoskeletal:      Right lower leg: No edema.      Left lower leg: No edema.    Skin:     General: Skin is warm and dry.   Neurological: Oriented x 3, moves all extremities, exam limited by compliance      ED Course     Labs Reviewed   ACETAMINOPHEN (TYLENOL), S - Abnormal; Notable for the following components:       Result Value    Acetaminophen <2.0 (*)     All other components within normal limits   SALICYLATE, SERUM - Abnormal; Notable for the following components:    Salicylate <3.0 (*)     All other components within normal limits   COMP METABOLIC PANEL (14) - Normal   ETHYL ALCOHOL - Normal   PROTHROMBIN TIME (PT) - Normal   PTT, ACTIVATED - Normal   SARS-COV-2 BY PCR (GENEXPERT) - Normal   CBC WITH DIFFERENTIAL WITH PLATELET   URINALYSIS WITH CULTURE REFLEX   ETHYL ALCOHOL   DRUG SCREEN 8 W/OUT CONFIRMATION, URINE   RAINBOW DRAW LAVENDER   RAINBOW DRAW LIGHT GREEN   RAINBOW DRAW BLUE   RAINBOW DRAW GOLD     EKG    Rate, intervals and axes as noted on EKG Report.  Rate: 77  Rhythm: Normal sinus rhythm   Reading: no ST elevation or depression                       MDM      Considered all emergent etiologies, differential includes but is not limited to: Benzodiazepine overdose, alcohol intoxication, suicide attempt     I have independently visualized the radiology images, my focus/limited interpretation: N/A     Defer to radiologist for other/incidental findings    Blood work unremarkable.  Presentation consistent with benzodiazepine overdose.  Patient placed on capnometry.  Will likely require psychiatric admission.  Pending clinical sobriety and signed out to oncoming physician        Medical Decision Making      Disposition and Plan     Clinical Impression:  1. Benzodiazepine overdose of undetermined intent, initial encounter         Disposition:  There is no disposition on file for this visit.  There is no disposition time on file for this visit.    Follow-up:  No follow-up provider specified.        Medications Prescribed:  Current Discharge Medication List               Supplementary Documentation:

## 2025-03-22 NOTE — PROGRESS NOTES
NURSING ADMISSION NOTE      Patient admitted via Cart  Oriented to room.  Safety precautions initiated.  Bed in low position.  Call light in reach.    Pt oriented x 3   Vitals stable at room air  SI precautions initiated    1:1 sitter at bedside   Schmidt intact with hematuria   Pt updated on poc and verbalize understanding

## 2025-03-22 NOTE — CONSULTS
[facility]  Report of Consultation    Robe Nunez Patient Status:  Observation    1950 MRN [id number]   Location [facility] 3NE-A Attending Francisco J Sharma MD   Hosp Day # 0 PCP Mango Boland MD     Reason for Consultation:  Urinary retention, gross hematuria, connolly trauma    History of Present Illness:  Robe Nunez is a a(n) 74 year old male admitted for somnolence from drug overdose. During workup was found to have retention PVR 500cc when just using an external catheter.  Straight cath was difficult and unsuccessful, so eventually coude connolly was placed. Urine has been bloody since then. Reports are retention of 600ml drained via catheter. He was quite drowsy from his overdose at that time per notes.  He will be needing inpatient psych care after this medical hospitalization.    He is s/p aquablation of his enlarged prostate with urinary retetion with Dr. Fred Wilcox at [facility] 24 for BPH volume just over 90cc.  Had urinary retention with chronic connolly prior to that. The connolly contributed to his depression and was apparently an issue during his last inpatient [facility] stay. . Had voiding trial with RN team  post op that was successful. Had one month check with Dr. Wilcox 24 without retention (PVR 12). Noted some dribbling, sometimes before, sometimes after voiding.  Missed his more recent post op check last week as he was not feeling well.    Tells me still has some leakage pre and post void.  Nocturia is only x 1. No dysuria. No hematuria. But does still need a pad.  Although this is improved compared with his pre-op retention, with his dysphoria/depression, it does still seem to bother him.  Hematuria did not start until after catheterization in the ER  I wonder if his retention was related to his level of sedation    History:  Past Medical History:    Cancer (HCC)    Skin cancer    Depression    Headache disorder    High blood pressure    High cholesterol    HTN (hypertension)     Hyperlipidemia    Nonspecific elevation of levels of transaminase or lactic acid dehydrogenase (LDH)    s/p liver biopsy normal    Visual impairment    glasses     Past Surgical History:   Procedure Laterality Date    Colonoscopy & polypectomy  05/09/2016    diverticulosis and a small polyp was removed; ASC    Colonoscopy,biopsy N/A 05/09/2016    Procedure: COLONOSCOPY, POSSIBLE BIOPSY, POSSIBLE POLYPECTOMY 20337;  Surgeon: Shaun Mock MD;  Location: [facility]    Other surgical history      scope knee    Other surgical history  11/29/2024    Aquablation of the prostate    Patient documented not to have experienced any of the following events N/A 05/09/2016    Procedure: COLONOSCOPY, POSSIBLE BIOPSY, POSSIBLE POLYPECTOMY 61179;  Surgeon: Shaun Mock MD;  Location: [facility]    Patient withough preoperative order for iv antibiotic surgical site infection prophylaxis. N/A 05/09/2016    Procedure: COLONOSCOPY, POSSIBLE BIOPSY, POSSIBLE POLYPECTOMY 59803;  Surgeon: Shaun Mock MD;  Location: [facility]    Special service or report  1990s    R knee arthroscopy lat meniscus     Family History   Problem Relation Age of Onset    Hypertension Father     Diabetes Father     Lipids Brother     Cancer Brother         prostate ca    Other (Other[other]) Sister         ?sle      reports that he has never smoked. He has never used smokeless tobacco. He reports that he does not drink alcohol and does not use drugs.    Allergies:  Allergies[1]    Medications:    Current Facility-Administered Medications:     cholecalciferol (Vitamin D3) tab 2,000 Units, 2,000 Units, Oral, Daily    atorvastatin (Lipitor) tab 10 mg, 10 mg, Oral, Nightly    acetaminophen (Tylenol Extra Strength) tab 500 mg, 500 mg, Oral, Q4H PRN    metoprolol succinate ER (Toprol XL) 24 hr tab 25 mg, 25 mg, Oral, Daily    Review of Systems:  Pertinent items are noted in HPI.  He is cooperative  Nursing is  helping to clean him up  He has been eating during the day  Has not had a BM  Thinks maybe had a BM last yesterday morning, but not sure  Has no pain from the catheter  Denies any abdominal pain    Physical Exam:  /73 (BP Location: Right arm)   Pulse 88   Temp 97.8 °F (36.6 °C) (Oral)   Resp 16   Ht 5' 7\" (1.702 m)   Wt 175 lb 8 oz (79.6 kg)   SpO2 98%   BMI 27.49 kg/m²   Alert  Answers all questions  Nurse is present  Family is outside waiting to come in to see him  ABD is nontender  BLADDER is not distended  CONNOLLY in place, but with a routine 10cc 18fr coude catheter, suspect balloon may be continuing to irritate the bladder neck/prostate fossa  Additional 30ml water added to balloon. New stat lock placed. Irrigated connolly with 300cc without any clots.  Will monitor the hematuria overnight. Catheter in better position now.  No pain during the irrigation      Laboratory Data:  Lab Results   Component Value Date    WBC 6.8 03/21/2025    HGB 13.9 03/21/2025    HCT 40.7 03/21/2025    .0 03/21/2025    CREATSERUM 1.15 03/21/2025    BUN 16 03/21/2025     03/21/2025    K 4.4 03/21/2025     03/21/2025    CO2 25.0 03/21/2025    GLU 93 03/21/2025    CA 10.2 03/21/2025    PTT 27.4 03/21/2025    INR 1.06 03/21/2025    PTP 13.9 03/21/2025       Imaging:  NA    Impression:  Patient Active Problem List   Diagnosis    Vitamin D deficiency    HTN (hypertension)    Glucose intolerance (impaired glucose tolerance)    Pure hypercholesterolemia    Benign non-nodular prostatic hyperplasia with lower urinary tract symptoms    MDD (major depressive disorder), recurrent severe, 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)    Generalized anxiety disorder    Insomnia related to another mental disorder    Benzodiazepine overdose of undetermined intent, initial encounter       BPH with history of retention  Almost 4mo s/p AQUABLATION of his enlarged prostate (pathology benign)  Still has had some pre and post void dribbling, but no  hematuria until the catheterizations yesterday    Recommendations:  Schmidt overnight   Hopefully color will improve  His MS and level of alertness has returned back to normal  He will be getting OOB with family and nursing  Will re-eval color of urine in the AM to see if voiding trial is appropriate  Discussed Tamra  Discussed that there are some OAB meds that are used sometimes post op to prostate outlet surgeries.  PVR can be checked prior to dc to [facility]      Thank you for allowing me to participate in the care of your patient.    Taiwo Hoffman MD  3/22/2025  2:56 PM         [1]   Allergies  Allergen Reactions    Other UNKNOWN     Hay Fever    Seasonal OTHER (SEE COMMENTS)     Sneezing, watery eyes

## 2025-03-22 NOTE — CONSULTS
[facility]  Report of Psychiatric Consultation    Robe Nunez Patient Status:  Observation    1950 MRN [id number]   [facility] 3NE-A Attending Frnacisco J Sharma MD   Hosp Day # 0 PCP Mango Boland MD     Date of Admission: 3/21/2025  Date of Consult: 3/22/2025   Reason for Consultation: Overdose/SI attempt     Impression: Patient is a 74-year-old male who presented to the ED yesterday.     Primary Psychiatric Diagnosis:  Major depressive disorder, recurrent episode, severe without psychosis     Generalized anxiety disorder     Insomnia related to another mental disorder     Personality Traits:  Deferred      Pertinent Medical Diagnoses:  Recent overdose on lorazepam and temazepam, urinary retention, hematuria, HTN     Recommendations:  Due to suicide attempt via intentional overdose, patient is in need of inpatient psychiatric hospitalization.    Patient's POA (his wife, Mckenzie) is in agreement with this disposition and has signed patient in for inpatient psychiatric treatment. POA paperwork was brought in today by Mckenzie. Provided copy to [facility] to be scanned into patient's chart.     Continue 1:1 sitter and suicide precautions.     Hold off on restarting outpatient psychiatric medications at this time given recent overdose.     Poison control has closed case, #[id number].     Transfer to inpatient psychiatric unit once medically cleared and once placement is established.     Psychiatry will continue to follow.     JOON Alegre, [facility]-BC     History of Present Illness:  Patient is a 74-year-old male who presented to the hospital yesterday following an overdose on his lorazepam and temazepam. Per chart review, he was found by his family with empty pill bottles and presented as drowsy. Following  level of care assessment, inpatient psychiatric hospitalization was the determined disposition however patient continued to present as drowsy after 12 hours of observation in the ED,  therefore he was medically admitted for further observation. Additionally, he was noted to be retaining urine in the ED and required placement of a connolly catheter. Hematuria noted following placement therefore urology has also been consulted.     At the time of visit this morning, patient is seen resting in bed. He does consent to his wife and daughter staying in the room during conversation. When asked how he has been feeling recently, he states he is \"not dealing with life\". Reports first noting his struggles with depression in September 2024 after attending a music festival in [place] with his wife and being unable to urinate upon returning home. He then required a catheter to be placed and had the catheter for approximately 2 months total. In October 2024, he went on vacation with his wife and developed a fever. Diagnosed with a UTI related to the catheter. Reports he then \"spiraled\" and was hospitalized at [facility] in November 2024.     Patient further details getting in an argument with his wife yesterday morning over taking care of each other, but he attributes the argument to being his fault. His wife then reports that he went to the bathroom and she found him standing in the bathroom. After he spoke with his wife in the bathroom, he then took approximately 25 tabs of temazepam (15 mg each) and 15-20 tabs of lorazepam (0.5 mg each). When asked if this was done in an attempt to end his life, he states \"I suppose yes\". Reports he then went back to sleep after taking the medications at about 9:30 AM yesterday morning.     He does endorse regret regarding taking the overdose and is happy that it was not successful. Reports it was an impulsive overdose but does endorse that the suicidal thoughts returned intermittently about one week ago. Denies these thoughts at the time of visit this morning. When asked about his mood recently, he states \"sometimes I feel okay and sometimes I don't\". He does confirm feeling  hopeless, helpless, and worthless. His motivation has been decreased and he admits to not completing his ADL's as often as he should. His concentration has been very poor. Reports feeling as though he is a burden to his family. His appetite has been poor but he reports attempting to eat 3 meals per day. Does endorse that he has lost about 20 pounds since November 2024. He reports that he is able to sleep 3.5-4 hours after taking his lorazepam and Seroquel, however will wake up at 3 AM and be unable to sleep further. Does feel very tired during the day. He confirms he is a worrier with frequent ruminations and racing thoughts. Denies panic attacks. Denies any pato or psychosis.     At the time of visit, patient is able to state that we are at the hospital but believes it is [facility]. Believes it is April but is aware it is 2025. States the current day as Friday. Does present as attentive while successfully reciting the days of the week backwards. He is awake during the conversation and does not have difficulty attending to the conversation, however does have his eyes closed frequently.     Following conversation with patient, did receive additional collateral from patient's wife at bedside. She noted him to sleep \"a ton\" throughout the past week and has barely gotten out of bed. She has felt as though patient has been regressing in terms of his depression for several weeks and yesterday asked him if he wanted to return to [facility] which he declined. In fact, patient's daughter reports they had scheduled for patient to resume ECT treatments this Tuesday and they were planning to have \"an intervention\" with patient today and inform him of this. When his wife found him standing in the bathroom yesterday morning, she asked him if he was having thoughts of harming himself and he denied. Today, he reports he had not yet taken the overdose when she asked him this. He then took the medications around 9:30 AM and  went to sleep. His wife attempted to wake him up around 4:30 PM which required several attempts. When he did wake up, he was slurring his words and eventually admitted taking the overdose to his wife. EMS was then called and he was brought to the emergency department.     Due to suicide attempt via intentional overdose, patient is in need of inpatient psychiatric treatment. Will hold off on resumption of his outpatient psychiatric medications at this time given his recent overdose and current concern for sedation. His POA (wife) has signed him in for inpatient psychiatric treatment. Did ask RN to call poison control today due to not being contacted previously, and they have closed the case. Continue suicide precautions and 1:1 sitter. Transfer to inpatient psychiatric unit once medically cleared and once placement is established.     Past Psychiatric History:  History of one additional suicide attempt \"30 years ago\" per family by cutting himself. No self-injuring behavior. History of 3 total inpatient psychiatric hospitalizations, most recently at [facility] from 11/16/24-11/26/24 due to severe major depression. History of recently undergoing ECT (received 11 treatments total) from the end of December through January but daughter reports he then started refusing. Family does report noticing an improvement in his mood while receiving ECT. He follows as an outpatient with Dr. Reyes and most recently saw her on 3/13/25. Started seeing an outpatient therapist, Charlie Holland through [facility], following discharge from [facility] however has only seen him a couple of times due to not attending while receiving ECT. Reports current psychiatric medication regimen of Cymbalta 60 mg daily, Seroquel 50 mg nightly, temazepam 15 mg nightly, and lorazepam 0.5 mg daily PRN anxiety.     Substance Use History:  Denies. BAL <3 upon arrival. UDS positive for benzodiazepines.     Psych Family History:  Two of his sisters have  major depression. One other sister has bipolar disorder. His mother had major depression. His paternal grandfather completed suicide.     Social and Developmental History:   He lives at home with his wife in [place]. He is a retired pharmacist. Has a total of 3 daughters and 4 grandchildren. Grew up in a large family and is the 5th of 7 siblings. Denies history of trauma or abuse.     Past Medical History:    Cancer (HCC)    Skin cancer    Depression    Headache disorder    High blood pressure    High cholesterol    HTN (hypertension)    Hyperlipidemia    Nonspecific elevation of levels of transaminase or lactic acid dehydrogenase (LDH)    s/p liver biopsy normal    Visual impairment    glasses     Past Surgical History:   Procedure Laterality Date    Colonoscopy & polypectomy  05/09/2016    diverticulosis and a small polyp was removed; ASC    Colonoscopy,biopsy N/A 05/09/2016    Procedure: COLONOSCOPY, POSSIBLE BIOPSY, POSSIBLE POLYPECTOMY 26003;  Surgeon: Shaun Mock MD;  Location: [facility]    Other surgical history      scope knee    Other surgical history  11/29/2024    Aquablation of the prostate    Patient documented not to have experienced any of the following events N/A 05/09/2016    Procedure: COLONOSCOPY, POSSIBLE BIOPSY, POSSIBLE POLYPECTOMY 53426;  Surgeon: Shaun Mock MD;  Location: [facility]    Patient withough preoperative order for iv antibiotic surgical site infection prophylaxis. N/A 05/09/2016    Procedure: COLONOSCOPY, POSSIBLE BIOPSY, POSSIBLE POLYPECTOMY 03499;  Surgeon: Shaun Mock MD;  Location: [facility]    Special service or report  1990s    R knee arthroscopy lat meniscus     Family History   Problem Relation Age of Onset    Hypertension Father     Diabetes Father     Lipids Brother     Cancer Brother         prostate ca    Other (Other[other]) Sister         ?sle      reports that he has never smoked. He has never used  smokeless tobacco. He reports that he does not drink alcohol and does not use drugs.    Allergies:  Allergies[1]    Medications:    Current Facility-Administered Medications:     cholecalciferol (Vitamin D3) tab 2,000 Units, 2,000 Units, Oral, Daily    atorvastatin (Lipitor) tab 10 mg, 10 mg, Oral, Nightly    acetaminophen (Tylenol Extra Strength) tab 500 mg, 500 mg, Oral, Q4H PRN    Review of Systems   Constitutional:  Positive for malaise/fatigue and weight loss.   Genitourinary:  Positive for hematuria.   Neurological:  Positive for tremors and weakness.   Psychiatric/Behavioral:  Positive for depression and suicidal ideas. Negative for hallucinations, memory loss and substance abuse. The patient is nervous/anxious and has insomnia.      Psychiatry Review Systems: No voices or visions. No elevated mood, racing thoughts, decreased need for sleep, grandiose thoughts, increased participation in risky behaviors, or history of trauma.     Mental Status Exam:     Risk Assessment  Suicidal ideation: no suicidal ideation currently, but with recent attempt via overdose   Homicidal ideation: None noted    Appearance: unremarkable  Behavior: tremors/tics, eyes closed at times during conversation but awake   Attitude: cooperative  Gait: Not observed     Speech: normal rate and rhythm and soft    Mood: sad, depressed, and anxious  Affect: Flat    Thought process: thought blocking  Thought content: ruminations  Perceptions: no hallucinations  Associations: Intact    Orientation: Alert and oriented x 2-3. Oriented to self and situation. Oriented to hospital but states it is [facility]. Oriented to year 2025 but reports it is Friday in April.   Attention and Concentration:   fair  Memory:  intact recent, intact remote, and impaired immediate  Language: Intact naming and repetition  Fund of Knowledge: Not assessed     Insight: impaired   Judgment: impaired     Objective    Vitals:    03/22/25 0929   BP: 157/84   Pulse:     Resp:    Temp:      Patient Strengths/Assets: Kind, willing to seek treatment      Suicide Risk Assessments:  Overall level of suicide risk is HIGH      Risk Factors: recent suicide attempt via overdose, severe depression, anxiety, insomnia, impulsivity, history of additional suicide attempt via cutting      Environmental Factors: lives at home with wife, has outpatient psychiatric providers, does take psychiatric medications     Protective Factors: family     Laboratory Data:  Lab Results   Component Value Date    WBC 6.8 03/21/2025    HGB 13.9 03/21/2025    HCT 40.7 03/21/2025    .0 03/21/2025    CREATSERUM 1.15 03/21/2025    BUN 16 03/21/2025     03/21/2025    K 4.4 03/21/2025     03/21/2025    CO2 25.0 03/21/2025    GLU 93 03/21/2025    CA 10.2 03/21/2025    ALB 4.3 03/21/2025    ALKPHO 55 03/21/2025    BILT 0.8 03/21/2025    TP 7.0 03/21/2025    AST 17 03/21/2025    ALT 14 03/21/2025    PTT 27.4 03/21/2025    INR 1.06 03/21/2025    PTP 13.9 03/21/2025    ETOH <3 03/21/2025       STUDIES:    JOON Alegre, PMHNP-BC  3/22/2025  9:38 AM         [1]   Allergies  Allergen Reactions    Other UNKNOWN     Hay Fever    Seasonal OTHER (SEE COMMENTS)     Sneezing, watery eyes

## 2025-03-22 NOTE — ED QUICK NOTES
Patient drowsy but able to answer what his name is and where he is. When asked if he remembers why he is here he does not answer, when asked if he took the medication in a suicide attempt patient states \"yes\".

## 2025-03-22 NOTE — PLAN OF CARE
Patient is alert and orientated, VSS, RA, afebrile and does not complain of pain. 1:1 sitter at bedside. Patient has indwelling connolly catheter draining red colored urine. CT of brain completed. Urology consulted and added 20 additional mL's of saline to balloon to keep balloon in place, flushed catheter and repositioned stat lock securement. Patient has good appetite. Psyche liaison consulted and met with patient and took copy of POA to social work. Patient family at bedside throughout day. Took short walk on unit with 2x assist and walker. Call light and safety precautions in place.      Problem: Patient/Family Goals  Goal: Patient/Family Long Term Goal  Description: Patient's Long Term Goal: Interventions:-  See additional Care Plan goals for specific interventions  Outcome: Progressing  Goal: Patient/Family Short Term Goal  Description: Patient's Short Term Goal: Interventions: -  See additional Care Plan goals for specific interventions  Outcome: Progressing     Problem: MUSCULOSKELETAL - ADULT  Goal: Return mobility to safest level of function  Description: INTERVENTIONS:- Assess patient stability and activity tolerance for standing, transferring and ambulating w/ or w/o assistive devices- Assist with transfers and ambulation using safe patient handling equipment as needed- Ensure adequate protection for wounds/incisions during mobilization- Obtain PT/OT consults as needed- Advance activity as appropriate- Communicate ordered activity level and limitations with patient/family  Outcome: Progressing     Problem: NEUROLOGICAL - ADULT  Goal: Achieves maximal functionality and self care  Description: INTERVENTIONS- Monitor swallowing and airway patency with patient fatigue and changes in neurological status- Encourage and assist patient to increase activity and self care with guidance from PT/OT- Encourage visually impaired, hearing impaired and aphasic patients to use assistive/communication devices  Outcome:  Progressing     Problem: Impaired Functional Mobility  Goal: Achieve highest/safest level of mobility/gait  Description: Interventions:- Assess patient's functional ability and stability- Promote increasing activity/tolerance for mobility and gait- Educate and engage patient/family in tolerated activity level and precautions  Outcome: Progressing     Problem: SAFETY ADULT - FALL  Goal: Free from fall injury  Description: INTERVENTIONS:- Assess pt frequently for physical needs- Identify cognitive and physical deficits and behaviors that affect risk of falls.- [place] fall precautions as indicated by assessment.- Educate pt/family on patient safety including physical limitations- Instruct pt to call for assistance with activity based on assessment- Modify environment to reduce risk of injury- Provide assistive devices as appropriate- Consider OT/PT consult to assist with strengthening/mobility- Encourage toileting schedule  Outcome: Progressing     Problem: DISCHARGE PLANNING  Goal: Discharge to home or other facility with appropriate resources  Description: INTERVENTIONS:- Identify barriers to discharge w/pt and caregiver- Include patient/family/discharge partner in discharge planning- Arrange for needed discharge resources and transportation as appropriate- Identify discharge learning needs (meds, wound care, etc)- Arrange for interpreters to assist at discharge as needed- Consider post-discharge preferences of patient/family/discharge partner- Complete POLST form as appropriate- Assess patient's ability to be responsible for managing their own health- Refer to Case Management Department for coordinating discharge planning if the patient needs post-hospital services based on physician/LIP order or complex needs related to functional status, cognitive ability or social support system  Outcome: Progressing

## 2025-03-23 LAB
ALBUMIN SERPL-MCNC: 3.9 G/DL (ref 3.2–4.8)
ALBUMIN/GLOB SERPL: 1.7 {RATIO} (ref 1–2)
ALP LIVER SERPL-CCNC: 56 U/L
ALT SERPL-CCNC: 10 U/L
ANION GAP SERPL CALC-SCNC: 9 MMOL/L (ref 0–18)
AST SERPL-CCNC: 15 U/L (ref ?–34)
ATRIAL RATE: 77 BPM
BASOPHILS # BLD AUTO: 0.04 X10(3) UL (ref 0–0.2)
BASOPHILS NFR BLD AUTO: 0.6 %
BILIRUB SERPL-MCNC: 0.6 MG/DL (ref 0.2–1.1)
BUN BLD-MCNC: 12 MG/DL (ref 9–23)
CALCIUM BLD-MCNC: 10 MG/DL (ref 8.7–10.6)
CHLORIDE SERPL-SCNC: 106 MMOL/L (ref 98–112)
CO2 SERPL-SCNC: 25 MMOL/L (ref 21–32)
CREAT BLD-MCNC: 0.97 MG/DL
EGFRCR SERPLBLD CKD-EPI 2021: 82 ML/MIN/1.73M2 (ref 60–?)
EOSINOPHIL # BLD AUTO: 0.21 X10(3) UL (ref 0–0.7)
EOSINOPHIL NFR BLD AUTO: 3.2 %
ERYTHROCYTE [DISTWIDTH] IN BLOOD BY AUTOMATED COUNT: 12.2 %
GLOBULIN PLAS-MCNC: 2.3 G/DL (ref 2–3.5)
GLUCOSE BLD-MCNC: 105 MG/DL (ref 70–99)
HCT VFR BLD AUTO: 36.4 %
HGB BLD-MCNC: 12.8 G/DL
IMM GRANULOCYTES # BLD AUTO: 0.02 X10(3) UL (ref 0–1)
IMM GRANULOCYTES NFR BLD: 0.3 %
LYMPHOCYTES # BLD AUTO: 1.79 X10(3) UL (ref 1–4)
LYMPHOCYTES NFR BLD AUTO: 27 %
MAGNESIUM SERPL-MCNC: 1.8 MG/DL (ref 1.6–2.6)
MCH RBC QN AUTO: 29 PG (ref 26–34)
MCHC RBC AUTO-ENTMCNC: 35.2 G/DL (ref 31–37)
MCV RBC AUTO: 82.5 FL
MONOCYTES # BLD AUTO: 0.48 X10(3) UL (ref 0.1–1)
MONOCYTES NFR BLD AUTO: 7.2 %
NEUTROPHILS # BLD AUTO: 4.1 X10 (3) UL (ref 1.5–7.7)
NEUTROPHILS # BLD AUTO: 4.1 X10(3) UL (ref 1.5–7.7)
NEUTROPHILS NFR BLD AUTO: 61.7 %
OSMOLALITY SERPL CALC.SUM OF ELEC: 290 MOSM/KG (ref 275–295)
P AXIS: 36 DEGREES
P-R INTERVAL: 162 MS
PLATELET # BLD AUTO: 184 10(3)UL (ref 150–450)
POTASSIUM SERPL-SCNC: 3.9 MMOL/L (ref 3.5–5.1)
PROT SERPL-MCNC: 6.2 G/DL (ref 5.7–8.2)
Q-T INTERVAL: 374 MS
QRS DURATION: 80 MS
QTC CALCULATION (BEZET): 423 MS
R AXIS: -24 DEGREES
RBC # BLD AUTO: 4.41 X10(6)UL
SODIUM SERPL-SCNC: 140 MMOL/L (ref 136–145)
T AXIS: 7 DEGREES
VENTRICULAR RATE: 77 BPM
WBC # BLD AUTO: 6.6 X10(3) UL (ref 4–11)

## 2025-03-23 PROCEDURE — 99232 SBSQ HOSP IP/OBS MODERATE 35: CPT

## 2025-03-23 RX ORDER — MAGNESIUM OXIDE 400 MG/1
400 TABLET ORAL ONCE
Status: COMPLETED | OUTPATIENT
Start: 2025-03-23 | End: 2025-03-23

## 2025-03-23 RX ORDER — POLYETHYLENE GLYCOL 3350 17 G/17G
17 POWDER, FOR SOLUTION ORAL DAILY PRN
Status: DISCONTINUED | OUTPATIENT
Start: 2025-03-23 | End: 2025-03-24

## 2025-03-23 RX ORDER — LISINOPRIL AND HYDROCHLOROTHIAZIDE 10; 12.5 MG/1; MG/1
1 TABLET ORAL
Status: DISCONTINUED | OUTPATIENT
Start: 2025-03-24 | End: 2025-03-23 | Stop reason: SDUPTHER

## 2025-03-23 NOTE — PROGRESS NOTES
[facility]  Progress Note    Robe Nunez Patient Status:  Observation    1950 MRN [id number]   Location [facility] 3NE-A Attending Francisco J Sharma MD   Hosp Day # 0 PCP Mango Boland MD     Subjective:  Robe Nunez is a(n) 74 year old male.    Hospital course to date: HD #3 for medical management after drug overdose    Current complaints: connolly catheter is draining natividad urine. No clots. No pain from the connolly    Objective:  /87 (BP Location: Right arm)   Pulse 87   Temp 98.1 °F (36.7 °C) (Oral)   Resp 17   Ht 5' 7\" (1.702 m)   Wt 175 lb 8 oz (79.6 kg)   SpO2 92%   BMI 27.49 kg/m²   ABD nontender  Connolly natividad without clots. Freely flowing      Laboratory Data:  Lab Results   Component Value Date    WBC 6.6 2025    HGB 12.8 2025    HCT 36.4 2025    .0 2025    CREATSERUM 0.97 2025    BUN 12 2025     2025    K 3.9 2025     2025    CO2 25.0 2025     2025    CA 10.0 2025    MG 1.8 2025     CBC:    Lab Results   Component Value Date    WBC 6.6 2025    WBC 6.8 2025    WBC 6.5 2024     Lab Results   Component Value Date    HGB 12.8 (L) 2025    HGB 13.9 2025    HGB 11.6 (L) 2024      Lab Results   Component Value Date    .0 2025    .0 2025    .0 2024     3/21/25 UA 3/21/25 was negative for rbc/hpf (?)  Despite 3+ blood on UA dip and trace LE and visible blood after straight cath attempts  urine culture is pending    Assessment:  Patient Active Problem List   Diagnosis    Vitamin D deficiency    HTN (hypertension)    Glucose intolerance (impaired glucose tolerance)    Pure hypercholesterolemia    Benign non-nodular prostatic hyperplasia with lower urinary tract symptoms    Severe episode of recurrent major depressive disorder, without psychotic features (HCC)    Generalized anxiety disorder    Insomnia due to  other mental disorder    Benzodiazepine overdose of undetermined intent, initial encounter     Enlarged prostate with history of urinary retention, connolly management, resolved after Aquablation with Dr. Wilcox 11/29/25  Urinary retention on admission in the setting of drug overdose and acute mental status changes  AMS resolved  Gross hematuria from catheter placements for the retention noted above    Plan:  Voiding trial before transfer to [facility]  Advised patient (and family yesterday) he may have some urinary incontinence and need pad/depends      Taiwo Hoffman MD  3/23/2025  7:51 AM

## 2025-03-23 NOTE — PHYSICAL THERAPY NOTE
PHYSICAL THERAPY EVALUATION - INPATIENT     Room Number: 3621/3621-A  Evaluation Date: 3/23/2025  Type of Evaluation: Initial  Physician Order: PT Eval and Treat    Presenting Problem: benzodiazepine overdose of undetermined intent  Co-Morbidities : HTN, DL, depression, BPH s/p cysto and TURB neck 11/29/24  Reason for Therapy: Mobility Dysfunction and Discharge Planning    PHYSICAL THERAPY ASSESSMENT   Patient is a 74 year old male admitted 3/21/2025 for benzodiazepine overdose of undetermined intent.  Prior to admission, patient's baseline is independent, lives with his wife and drives.  Patient is currently functioning below baseline with bed mobility, transfers, gait, stair negotiation, maintaining seated position, and standing prolonged periods.  Patient is requiring contact guard assist as a result of the following impairments: decreased functional strength, decreased endurance/aerobic capacity, impaired standing balance, and decreased muscular endurance.  Physical Therapy will continue to follow for duration of hospitalization.    Patient will benefit from continued skilled PT Services at discharge to promote prior level of function.  Anticipate patient will return home with home health PT.    PLAN DURING HOSPITALIZATION  Nursing Mobility Recommendation : 1 Assist  PT Device Recommendation: Rolling walker  PT Treatment Plan: Bed mobility, Endurance, Energy conservation, Patient education, Family education, Gait training, Strengthening, Stair training, Transfer training  Rehab Potential : Good  Frequency (Obs): 3x/week     CURRENT GOALS    Goal #1 Patient is able to demonstrate supine - sit EOB @ level: independent     Goal #2 Patient is able to demonstrate transfers Sit to/from Stand at assistance level: supervision     Goal #3 Patient is able to ambulate 150 feet with assist device: walker - rolling at assistance level: supervision     Goal #4 Patient will ascend/descend 3 steps with handrail and supervision.     Goal #5    Goal #6    Goal Comments: Goals established on 3/23/2025      PHYSICAL THERAPY MEDICAL/SOCIAL HISTORY  History related to current admission: Patient is a 74 year old male admitted on 3/21/2025 from home for benzodiazepine overdose of undetermined intent.    HOME SITUATION  Type of Home: House  Home Layout: One level  Stairs to Enter : 3   Railing: No    Stairs to Bedroom: 0         Lives With: Spouse    Drives: Yes   Patient Regularly Uses: Reading glasses      Prior Level of [place]: Patient reports he functions mostly independently, used a RW 1x this past week due to some dizziness. Supportive family at bedside. Drives.     SUBJECTIVE  \"It feels good to walk.\"     OBJECTIVE  Precautions:  (suicide)  Fall Risk: High fall risk    WEIGHT BEARING RESTRICTION     PAIN ASSESSMENT  Ratin  Location: patient denies any pain at rest       COGNITION  Overall Cognitive Status:  WFL - within functional limits  Following Commands:  follows all commands and directions without difficulty    RANGE OF MOTION AND STRENGTH ASSESSMENT  Upper extremity ROM and strength are within functional limits     Lower extremity ROM is within functional limits     Lower extremity strength is within functional limits     BALANCE  Static Sitting: Good  Dynamic Sitting: Good  Static Standing: Fair  Dynamic Standing: Fair    ADDITIONAL TESTS                                    ACTIVITY TOLERANCE  Pulse: 107  Heart Rate Source: Monitor  Resp: 22  BP: 157/85  BP Location: Right arm  BP Method: Automatic  Patient Position: Sitting    O2 WALK       NEUROLOGICAL FINDINGS                        AM-PAC '6-Clicks' INPATIENT SHORT FORM - BASIC MOBILITY  How much difficulty does the patient currently have...  Patient Difficulty: Turning over in bed (including adjusting bedclothes, sheets and blankets)?: None   Patient Difficulty: Sitting down on and standing up from a chair with arms (e.g., wheelchair, bedside commode, etc.): A Little    Patient Difficulty: Moving from lying on back to sitting on the side of the bed?: None   How much help from another person does the patient currently need...   Help from Another: Moving to and from a bed to a chair (including a wheelchair)?: A Little   Help from Another: Need to walk in hospital room?: A Little   Help from Another: Climbing 3-5 steps with a railing?: A Little     AM-PAC Score:  Raw Score: 20   Approx Degree of Impairment: 35.83%   Standardized Score (AM-PAC Scale): 47.67   CMS Modifier (G-Code): CJ    FUNCTIONAL ABILITY STATUS  Gait Assessment   Functional Mobility/Gait Assessment  Gait Assistance: Contact guard assist  Distance (ft): 100  Assistive Device: Rolling walker  Pattern: Within Functional Limits    Skilled Therapy Provided     Bed Mobility:  Rolling: independent  Supine to sit: supervision   Sit to supine: NT     Transfer Mobility:  Sit to stand: CGA, RW   Stand to sit: CGA, RW  Gait = CGA, RW, x100 ft.    Therapist's Comments: Patient presents to PT supine in bed, family supportive at bedside and patient denies any pain at rest. Patient progressed well functionally with PT performing bed mobility with supervision and short distance ambulation using a RW within the halls. Patient left sitting up in the bedside chair with BLE's propped, family remaining supportively, all needs within reach and sitter at bedside. Recommend dc home with HHPT. Per notes, plan is for dc to inpatient psych.    Exercise/Education Provided:  Functional activity tolerated  Gait training  Posture  Strengthening  Transfer training    Patient End of Session: Up in chair, Needs met, Call light within reach, RN aware of session/findings, All patient questions and concerns addressed, Hospital anti-slip socks, Family present, Other (Comment) (sitter present)      Patient Evaluation Complexity Level:  History Moderate - 1 or 2 personal factors and/or co-morbidities   Examination of body systems Low -  addressing 1-2  elements   Clinical Presentation  Moderate - Evolving   Clinical Decision Making Moderate Complexity       PT Session Time: 33 minutes  Gait Trainin minutes  Therapeutic Activity: 3 minutes  Neuromuscular Re-education: 0 minutes  Therapeutic Exercise: 0 minutes

## 2025-03-23 NOTE — PROGRESS NOTES
[facility]  Report of Psychiatric Progress Note    Robe Nunez Patient Status:  Observation    1950 MRN [id number]   Location [facility] 3NE-A Attending Francisco J Sharma MD   Hosp Day # 0 PCP Mango Boland MD     Date of Admission: 3/21/2025  Date of Service: 3/23/2025   Reason for Consultation: Overdose/SI attempt     Impression:    Primary Psychiatric Diagnosis:  Major depressive disorder, recurrent episode, severe without psychosis      Generalized anxiety disorder      Insomnia related to another mental disorder      Personality Traits:  Deferred       Pertinent Medical Diagnoses:  Recent overdose on lorazepam and temazepam, urinary retention, hematuria, HTN      Recommendations:  Due to suicide attempt via intentional overdose, patient is in need of inpatient psychiatric hospitalization.     Patient's POA (his wife, Mckenzie) is in agreement with this disposition and has signed patient in for inpatient psychiatric treatment. POA paperwork was brought in by Mckenzie and has been scanned into patient's chart by the [facility].     Continue 1:1 sitter and suicide precautions.      Hold off on restarting outpatient psychiatric medications at this time given recent overdose.      Poison control has closed case, #[id number].      Transfer to inpatient psychiatric unit once medically cleared and once placement is established.      Psychiatry will continue to follow.     JOON Alegre, PMHNP-BC    Subjective:  Chart reviewed. Spoke with RN. Patient seen this morning sitting up in the chair with sitter at bedside. He is alert and is oriented to self, place, time, and situation. Mental status has continued to improve compared to presentation yesterday. He reports he slept fairly well last night which surprised him and he reports feeling better today emotionally. Denies anxiety currently. Denies any suicidal thoughts currently and continues to endorse regret regarding the impulsive overdose. Reports  his appetite is good and plans to eat breakfast soon. He frequently expresses that he does not want to go to [facility] because \"it feels like senior living\", however discussed extensively with patient regarding the reasoning behind inpatient psychiatric treatment. He does retain some insight regarding his recent worsening depression and the negative impact it has had on his daily functioning. He recognizes the impact that one good night of sleep has had on his mood today and discussed with patient that if he were to go home and not be able to sleep, do expect his mood to deteriorate again with potential for the suicidal thoughts to return. He asks if he could go home and resume ECT as an outpatient instead of undergoing inpatient psychiatric treatment. Discussed with him that the current disposition will continue to be inpatient psychiatric treatment and reminded him of our discussion yesterday with his wife and daughter who were in agreement with this as well. Continue sitter and suicide precautions. Plan for transfer to the inpatient psychiatric unit once medically cleared to do so. Psychiatry will continue to follow.     Past Medical History:    Cancer (HCC)    Skin cancer    Depression    Headache disorder    High blood pressure    High cholesterol    HTN (hypertension)    Hyperlipidemia    Nonspecific elevation of levels of transaminase or lactic acid dehydrogenase (LDH)    s/p liver biopsy normal    Visual impairment    glasses     Past Surgical History:   Procedure Laterality Date    Colonoscopy & polypectomy  05/09/2016    diverticulosis and a small polyp was removed; ASC    Colonoscopy,biopsy N/A 05/09/2016    Procedure: COLONOSCOPY, POSSIBLE BIOPSY, POSSIBLE POLYPECTOMY 41482;  Surgeon: Shaun Mock MD;  [place] [facility]    Other surgical history      scope knee    Other surgical history  11/29/2024    Aquablation of the prostate    Patient documented not to have experienced any of the following  events N/A 05/09/2016    Procedure: COLONOSCOPY, POSSIBLE BIOPSY, POSSIBLE POLYPECTOMY 51803;  Surgeon: Shaun Mock MD;  Location: [facility]    Patient withough preoperative order for iv antibiotic surgical site infection prophylaxis. N/A 05/09/2016    Procedure: COLONOSCOPY, POSSIBLE BIOPSY, POSSIBLE POLYPECTOMY 90610;  Surgeon: Shaun Mock MD;  Location: [facility]    Special service or report  1990s    R knee arthroscopy lat meniscus     Family History   Problem Relation Age of Onset    Hypertension Father     Diabetes Father     Lipids Brother     Cancer Brother         prostate ca    Other (Other[other]) Sister         ?sle      reports that he has never smoked. He has never used smokeless tobacco. He reports that he does not drink alcohol and does not use drugs.    Allergies:  Allergies[1]    Medications:  Current Facility-Administered Medications:     cholecalciferol (Vitamin D3) tab 2,000 Units, 2,000 Units, Oral, Daily    atorvastatin (Lipitor) tab 10 mg, 10 mg, Oral, Nightly    acetaminophen (Tylenol Extra Strength) tab 500 mg, 500 mg, Oral, Q4H PRN    metoprolol succinate ER (Toprol XL) 24 hr tab 25 mg, 25 mg, Oral, Daily    Review of Systems   Constitutional:  Positive for malaise/fatigue and weight loss.   Genitourinary:  Positive for hematuria.   Neurological:  Positive for weakness.   Psychiatric/Behavioral:  Positive for depression. Negative for hallucinations, memory loss, substance abuse and suicidal ideas (denies currently). The patient is nervous/anxious and has insomnia.      Mental Status Exam:   Risk Assessment  Suicidal ideation: no suicidal ideation currently, but with recent attempt via overdose   Homicidal ideation: None noted    Appearance: unremarkable  Behavior: unremarkable  Attitude: cooperative  Gait: Not observed     Speech: normal rate, rhythm and volume    Mood: sad, depressed, and anxious  Affect: Congruent    Thought process: mild thought  blocking  Thought content: ruminations  Perceptions: no hallucinations  Associations: Intact    Orientation: Alert and oriented x 4  Attention and Concentration:   good  Memory:  intact immediate, recent, remote  Language: Intact naming and repetition  Fund of Knowledge: Not assessed     Insight: impaired   Judgment: impaired     Objective    Vitals:    03/23/25 0815   BP: 154/75   Pulse: 90   Resp: 24   Temp: 98 °F (36.7 °C)     Suicide Risk Assessments:  Overall level of suicide risk is HIGH      Risk Factors: recent suicide attempt via overdose, severe depression, anxiety, insomnia, impulsivity, history of additional suicide attempt via cutting      Environmental Factors: lives at home with wife, has outpatient psychiatric providers, does take psychiatric medications      Protective Factors: family     Laboratory Data:  Lab Results   Component Value Date    WBC 6.6 03/23/2025    HGB 12.8 03/23/2025    HCT 36.4 03/23/2025    .0 03/23/2025    CREATSERUM 0.97 03/23/2025    BUN 12 03/23/2025     03/23/2025    K 3.9 03/23/2025     03/23/2025    CO2 25.0 03/23/2025     03/23/2025    CA 10.0 03/23/2025    ALB 3.9 03/23/2025    ALKPHO 56 03/23/2025    BILT 0.6 03/23/2025    TP 6.2 03/23/2025    AST 15 03/23/2025    ALT 10 03/23/2025    MG 1.8 03/23/2025       STUDIES:    Ana Luisa Martel, JOON, PMKYAWP-BC         [1]   Allergies  Allergen Reactions    Other UNKNOWN     Hay Fever    Seasonal OTHER (SEE COMMENTS)     Sneezing, watery eyes

## 2025-03-23 NOTE — PLAN OF CARE
Assumed care at 0730  A&Ox4  Room air  NSR on tele  Standby x1 assist with walker  Denies pain, chest pain, sob  General diet  Suicide precautions, sitter at bedside  Right wrist + RAC PIV, dressing changed, c/d/I, flushed, saline locked  Non cardiac electrolyte replacement- Mag replaced  Miralax PRN given per pt request  Schmidt removed per orders, void trial complete, PVR negative see flowsheets for more details  Urine remains red, urology aware  Needs met at this time, call light within reach, bed lowered and locked, sitter at bedside,Pt and family updated on plan of care  Plan for tito santos    Problem: Patient/Family Goals  Goal: Patient/Family Long Term Goal  Description: Patient's Long Term Goal: Discharge home Interventions:- Supportive care patient counseling regarding mood and paulie for safety - See additional Care Plan goals for specific interventions  Outcome: Progressing  Goal: Patient/Family Short Term Goal  Description: Patient's Short Term Goal: Void trial Interventions: - Bladder scan post void for PVR, encourage use of urinal - See additional Care Plan goals for specific interventions  Outcome: Progressing     Problem: MUSCULOSKELETAL - ADULT  Goal: Return mobility to safest level of function  Description: INTERVENTIONS:- Assess patient stability and activity tolerance for standing, transferring and ambulating w/ or w/o assistive devices- Assist with transfers and ambulation using safe patient handling equipment as needed- Ensure adequate protection for wounds/incisions during mobilization- Obtain PT/OT consults as needed- Advance activity as appropriate- Communicate ordered activity level and limitations with patient/family  Outcome: Progressing     Problem: NEUROLOGICAL - ADULT  Goal: Achieves maximal functionality and self care  Description: INTERVENTIONS- Monitor swallowing and airway patency with patient fatigue and changes in neurological status- Encourage and assist patient to increase  activity and self care with guidance from PT/OT- Encourage visually impaired, hearing impaired and aphasic patients to use assistive/communication devices  Outcome: Progressing     Problem: Impaired Functional Mobility  Goal: Achieve highest/safest level of mobility/gait  Description: Interventions:- Assess patient's functional ability and stability- Promote increasing activity/tolerance for mobility and gait- Educate and engage patient/family in tolerated activity level and precautions- Recommend use of  RW for transfers and ambulation  Outcome: Progressing     Problem: SAFETY ADULT - FALL  Goal: Free from fall injury  Description: INTERVENTIONS:- Assess pt frequently for physical needs- Identify cognitive and physical deficits and behaviors that affect risk of falls.- [place] fall precautions as indicated by assessment.- Educate pt/family on patient safety including physical limitations- Instruct pt to call for assistance with activity based on assessment- Modify environment to reduce risk of injury- Provide assistive devices as appropriate- Consider OT/PT consult to assist with strengthening/mobility- Encourage toileting schedule  Outcome: Progressing     Problem: DISCHARGE PLANNING  Goal: Discharge to home or other facility with appropriate resources  Description: INTERVENTIONS:- Identify barriers to discharge w/pt and caregiver- Include patient/family/discharge partner in discharge planning- Arrange for needed discharge resources and transportation as appropriate- Identify discharge learning needs (meds, wound care, etc)- Arrange for interpreters to assist at discharge as needed- Consider post-discharge preferences of patient/family/discharge partner- Complete POLST form as appropriate- Assess patient's ability to be responsible for managing their own health- Refer to Case Management Department for coordinating discharge planning if the patient needs post-hospital services based on physician/LIP order or  complex needs related to functional status, cognitive ability or social support system  Outcome: Progressing

## 2025-03-23 NOTE — BH PROGRESS NOTE
This writer met with patient and patient's family at bedside.  Patient's wife/POA was also present at bedside.  This writer notified them that on Friday, [facility] did not see any out-of-network benefits in the online system.  This writer notified them that [facility] could try calling tomorrow to see if the patient has out of network benefits.  Patient's wife reports the patient stayed at [facility] in November 2024.  Patient's wife/POA reports she prefers to wait for [facility] to call on the benefits tomorrow to see if the patient has out of network benefits.  This writer offered to start the transfer process to other hospitals this evening, however patient's wife/POA and family declined.  Patient's wife and family reported their preference is for [facility].

## 2025-03-23 NOTE — ED NOTES
This writer met with patient and patient's family at bedside.  Patient's wife/POA was also present at bedside.  This writer notified them that on Friday, [facility] did not see any out-of-network benefits in the online system.  This writer notified them that [facility] could try calling tomorrow to see if the patient has out of network benefits.  Patient's wife reports the patient stayed at [facility] in November 2024.  Patient's wife/POA reports she prefers to wait for [facility] to call on the benefits tomorrow to see if the patient has out of network benefits.  This writer offered to start the transfer process to other hospitals this evening, however patient's wife/POA and family declined.  Patient's wife and family reported their preference is for [facility].

## 2025-03-23 NOTE — PROGRESS NOTES
[facility]   part of [facility] Hospitalist Progress Note     Robe Nunez Patient Status:  Observation    1950 MRN [id number]   Location [facility] 3NE-A Attending Francisco J Sharma MD   Hosp Day # 0 PCP Mango Boland MD     Subjective:     Patient seen and examined.   Awake and alert  Sitting in chair  Family at bedside  Schmidt removed just before I came to see pt  No major complaits  Says he doesn't want to go to [facility]    Objective:    Review of Systems:   10 point ROS completed and was negative, except for pertinent positive and negatives stated in subjective.    Vital signs:  Temp:  [97.3 °F (36.3 °C)-98.5 °F (36.9 °C)] 98.1 °F (36.7 °C)  Pulse:  [] 87  Resp:  [16-20] 17  BP: (136-157)/(73-87) 144/87  SpO2:  [92 %-95 %] 92 %    Physical Exam:    General: No acute distress.   HEENT:  EOMI, PERRLA, OP clear  Respiratory: Clear to auscultation bilaterally. No wheezes. No rhonchi.  Cardiovascular: S1, S2. Regular rate and rhythm. No murmurs.  Abdomen: Soft, nontender, nondistended.  Positive bowel sounds. No rebound or guarding.  Extremities: No edema.  Neuro:  Grossly non focal, no motor deficits.        Diagnostic Data:    Labs:  Recent Labs   Lab 25  0543   WBC 6.8 6.6   HGB 13.9 12.8*   MCV 85.9 82.5   .0 184.0   INR 1.06  --        Recent Labs   Lab 25  0543   GLU 93 105*   BUN 16 12   CREATSERUM 1.15 0.97   CA 10.2 10.0   ALB 4.3 3.9    140   K 4.4 3.9    106   CO2 25.0 25.0   ALKPHO 55 56   AST 17 15   ALT 14 10   BILT 0.8 0.6   TP 7.0 6.2       Estimated Creatinine Clearance: 62.5 mL/min (based on SCr of 0.97 mg/dL).    Recent Labs   Lab 25   PTP 13.9   INR 1.06            COVID-19 Lab Results    COVID-19  Lab Results   Component Value Date    COVID19 Not Detected 2025       Pro-Calcitonin  No results for input(s): \"PCT\" in the last 168 hours.    Cardiac  No results for  input(s): \"TROP\", \"PBNP\" in the last 168 hours.    Creatinine Kinase  No results for input(s): \"CK\" in the last 168 hours.    Inflammatory Markers  No results for input(s): \"CRP\", \"JOSIAS\", \"LDH\", \"DDIMER\" in the last 168 hours.    Imaging: Imaging data reviewed in Epic.    Medications:    magnesium oxide  400 mg Oral Once    cholecalciferol  2,000 Units Oral Daily    atorvastatin  10 mg Oral Nightly    metoprolol succinate ER  25 mg Oral Daily       Assessment & Plan:    Robe Nunez is a 74 year old male with PMH sig for HTN, DL, depression, presents to ED for drug overdose.     Drug overdose w/ benzos - intentional, suicide attempt  Depression  -monitor on tele  -suicide precautions  -sitter  -supportive measures  -holding psych meds  -Psych consult > plan for inpt psych stay     Urinary retention  BPH sp cysto and TURB neck 11/29/24  -connolly placed in ED  -hematuria afterwards  -UA neg  -urology consulted >> apprec recs >> connolly in >> voiding trial today before tx to [facility]; advised pt he may have some incontinance and need pads/depends     HTN  DL  -resume home meds  -monitor on tele              Quality:  DVT Prophylaxis: lovenox, scds  CODE status: full code  Connolly: yes  If COVID testing is negative, may discontinue isolation: yes      DISPO  DC planning in process  Plan to dc to [facility] - for inpt psych stay  OK for dc to [facility] inpt psych stay from medical standpoint  Await final recs from urology    Plan of care discussed with patient and RN - all questions answered.           Jenna Serrano MD  [facility] Hospitalist  Pager [phone]  Answering Service number: [phone]

## 2025-03-23 NOTE — PROGRESS NOTES
Pt A&Ox3 Room Air  Resting in bed  Denies pain at this time  Meds Given per Mar  Connolly in place, 20 extra Ml saline placed in connolly Balloon per Urology.   Sitter 1:1    Safety Precaution maintained  Plan of Care

## 2025-03-23 NOTE — OCCUPATIONAL THERAPY NOTE
OCCUPATIONAL THERAPY EVALUATION - INPATIENT    Room Number: 3621/3621-A  Evaluation Date: 3/23/2025     Type of Evaluation: Initial  Presenting Problem: SI, overdose    Physician Order: IP Consult to Occupational Therapy  Reason for Therapy:  ADL/IADL Dysfunction and Discharge Planning      OCCUPATIONAL THERAPY ASSESSMENT   Patient met all OT goals at SBA-mod (I) level.    Patient reports no further questions/concerns at this time.   Discharge OT in hospital.          History: Patient is a 74 year old male admitted on 3/21/2025 with Presenting Problem: SI, overdose. Co-Morbidities : HTN, DL, depression, BPH s/p cysto and TURB neck 11/29/24    WEIGHT BEARING RESTRICTION  Weight Bearing Restriction: None                Recommendations for nursing staff:   Transfers: SBA with RW  Toileting location: Toilet    EVALUATION SESSION:  ACTIVITY TOLERANCE   Vitals wfl        PATIENT START OF SESSION: semi supine, family present  FUNCTIONAL TRANSFER ASSESSMENT  Sit to Stand: Edge of Bed  Edge of Bed: Stand-by Assist  Toilet Transfer: Stand-by Assist    BED MOBILITY  Supine to Sit : Supervision    BALANCE ASSESSMENT     FUNCTIONAL ADL ASSESSMENT  Grooming Standing: Modified Independent  Toileting Seated: Modified Independent      EQUIPMENT USED: RW  Demonstrates functional use    THERAPIST COMMENTS: ADL/mobility as noted. Educated on unilateral UE support on sink during grooming; completes mod (I) at sink. Ambulates SBA with RW in hallway. SBA bilateral UE reach for simulated two handed standing task.     Patient End of Session: Up in chair, Call light within reach, Needs met, RN aware of session/findings, All patient questions and concerns addressed, Hospital anti-slip socks, Family present, With  staff (sitter present)    OCCUPATIONAL PROFILE    HOME SITUATION  Type of Home: House  Home Layout: One level  Lives With: Spouse    Toilet and Equipment: Standard height toilet  Shower/Tub and Equipment: Walk-in shower, Grab bar,  Shower chair (built in shower seat, doesn't use)  Other Equipment:  (walker, uses occasionally if dizzy)       Hand Dominance: Right  Drives: Yes  Patient Regularly Uses: Reading glasses    Prior Level of Function: Pt reports independence with ADL and ambulation without AD prior to admit. Pt states he has a walker and occasionally uses it if he feels dizzy, but that doesn't happen often.       SUBJECTIVE  Pt states yesterday he tried to stand up and felt very unsteady; today he feels much better.     PAIN ASSESSMENT  Rating: Unable to rate  Location: R wrist IV site  Management Techniques: Nurse notified    OBJECTIVE  Precautions:  (suicide)  Fall Risk: High fall risk    WEIGHT BEARING RESTRICTION  Weight Bearing Restriction: None                AM-PAC ‘6-Clicks’ Inpatient Daily Activity Short Form  -   Putting on and taking off regular lower body clothing?: A Little  -   Bathing (including washing, rinsing, drying)?: A Little  -   Toileting, which includes using toilet, bedpan or urinal? : A Little  -   Putting on and taking off regular upper body clothing?: None  -   Taking care of personal grooming such as brushing teeth?: None  -   Eating meals?: None    AM-PAC Score:  Score: 21  Approx Degree of Impairment: 32.79%  Standardized Score (AM-PAC Scale): 44.27      ADDITIONAL TESTS     NEUROLOGICAL FINDINGS        PLAN   Patient has been evaluated and presents with no skilled Occupational Therapy needs at this time.  Patient discharged from Occupational Therapy services.  Please re-order if a new functional limitation presents during this admission.      Patient Evaluation Complexity Level:   Occupational Profile/Medical History LOW   Specific performance deficits impacting engagement in ADL/IADL LOW   Client Assessment/Performance Deficits LOW   Clinical Decision Making LOW   Overall Complexity LOW     OT Session Time: 25 minutes  Self-Care Home Management: 15 minutes  Therapeutic Activity:  minutes  Neuromuscular  Re-education:  minutes  Therapeutic Exercise:  minutes

## 2025-03-24 VITALS
TEMPERATURE: 97 F | RESPIRATION RATE: 10 BRPM | BODY MASS INDEX: 27.55 KG/M2 | DIASTOLIC BLOOD PRESSURE: 78 MMHG | OXYGEN SATURATION: 100 % | HEIGHT: 67 IN | HEART RATE: 91 BPM | SYSTOLIC BLOOD PRESSURE: 142 MMHG | WEIGHT: 175.5 LBS

## 2025-03-24 PROBLEM — T42.4X4A BENZODIAZEPINE OVERDOSE OF UNDETERMINED INTENT, INITIAL ENCOUNTER: Status: RESOLVED | Noted: 2025-03-22 | Resolved: 2025-03-24

## 2025-03-24 PROBLEM — F33.2 MDD (MAJOR DEPRESSIVE DISORDER), RECURRENT EPISODE, SEVERE (HCC): Status: ACTIVE | Noted: 2025-03-24

## 2025-03-24 LAB — MAGNESIUM SERPL-MCNC: 1.9 MG/DL (ref 1.6–2.6)

## 2025-03-24 PROCEDURE — 99232 SBSQ HOSP IP/OBS MODERATE 35: CPT | Performed by: OTHER

## 2025-03-24 NOTE — PLAN OF CARE
Assumed care at 0730  A&Ox4   Suicide precautions, sitter at bedside  Room air  NSR on tele  Ambulating with RW  Denies pain, sob  General diet  RPIV flushed, saline locked, dressing c/d/I  Non cardiac replacement electrolyte protocol  Needs met at this time, call light within reach, bed lowered and locked, sitter at bedside, pt and family updated on plan of care, all needs met, will continue with plan of care  Pt medically cleared, awaiting for psych to see, pt has bed at [facility]    Problem: Patient/Family Goals  Goal: Patient/Family Long Term Goal  Description: Patient's Long Term Goal: Manage depression Interventions:- Restart medications, ECT- See additional Care Plan goals for specific interventions  3/24/2025 1139 by Parul Claudio, RN  Outcome: Progressing     Problem: Patient/Family Goals  Goal: Patient/Family Short Term Goal  Description: Patient's Short Term Goal: Maintain safety Interventions: - one to one sitter - See additional Care Plan goals for specific interventions  3/24/2025 1139 by Parul Claudio, RN  Outcome: Progressing     Problem: MUSCULOSKELETAL - ADULT  Goal: Return mobility to safest level of function  Description: INTERVENTIONS:- Assess patient stability and activity tolerance for standing, transferring and ambulating w/ or w/o assistive devices- Assist with transfers and ambulation using safe patient handling equipment as needed- Ensure adequate protection for wounds/incisions during mobilization- Obtain PT/OT consults as needed- Advance activity as appropriate- Communicate ordered activity level and limitations with patient/family  3/24/2025 1139 by Parul Claudio, RN  Outcome: Progressing     Problem: NEUROLOGICAL - ADULT  Goal: Achieves maximal functionality and self care  Description: INTERVENTIONS- Monitor swallowing and airway patency with patient fatigue and changes in neurological status- Encourage and assist patient to increase activity and self care with guidance from PT/OT-  Encourage visually impaired, hearing impaired and aphasic patients to use assistive/communication devices  3/24/2025 1139 by Parul Claudio RN  Outcome: Progressing     Problem: Impaired Functional Mobility  Goal: Achieve highest/safest level of mobility/gait  Description: Interventions:- Assess patient's functional ability and stability- Promote increasing activity/tolerance for mobility and gait- Educate and engage patient/family in tolerated activity level and precautions- Recommend use of  RW for transfers and ambulation  3/24/2025 1139 by Parul Claudio RN  Outcome: Progressing     Problem: SAFETY ADULT - FALL  Goal: Free from fall injury  Description: INTERVENTIONS:- Assess pt frequently for physical needs- Identify cognitive and physical deficits and behaviors that affect risk of falls.- [place] fall precautions as indicated by assessment.- Educate pt/family on patient safety including physical limitations- Instruct pt to call for assistance with activity based on assessment- Modify environment to reduce risk of injury- Provide assistive devices as appropriate- Consider OT/PT consult to assist with strengthening/mobility- Encourage toileting schedule  3/24/2025 1139 by Parul Claudio RN  Outcome: Progressing     Problem: DISCHARGE PLANNING  Goal: Discharge to home or other facility with appropriate resources  Description: INTERVENTIONS:- Identify barriers to discharge w/pt and caregiver- Include patient/family/discharge partner in discharge planning- Arrange for needed discharge resources and transportation as appropriate- Identify discharge learning needs (meds, wound care, etc)- Arrange for interpreters to assist at discharge as needed- Consider post-discharge preferences of patient/family/discharge partner- Complete POLST form as appropriate- Assess patient's ability to be responsible for managing their own health- Refer to Case Management Department for coordinating discharge planning if the  patient needs post-hospital services based on physician/LIP order or complex needs related to functional status, cognitive ability or social support system  3/24/2025 1139 by Parul Claudio, RN  Outcome: Progressing

## 2025-03-24 NOTE — TRIAGE
Pt was accepted to unit A2/Geriatric in bed 916B under the care of Dr. Rosi Reyes.  SBAR given to Elin RN at 11:363.  Nurse to nurse given to Laya RN at 11:48.  Okay to set up transport once nurse to nurse is completed.

## 2025-03-24 NOTE — PROGRESS NOTES
[facility]   part of [facility] Hospitalist Progress Note     Robe Nunez Patient Status:  Observation    1950 MRN [id number]   Location [facility] 3NE-A Attending Francisco J Sharma MD   Hosp Day # 0 PCP Mango Boland MD     Subjective:     Patient seen and examined.   Awake and alert  Sitting in chair  Family at bedside  Schmidt out and urinating well  No major complaits      Objective:    Review of Systems:   10 point ROS completed and was negative, except for pertinent positive and negatives stated in subjective.    Vital signs:  Temp:  [97.8 °F (36.6 °C)-98.1 °F (36.7 °C)] 98.1 °F (36.7 °C)  Pulse:  [] 86  Resp:  [15-24] 16  BP: (136-157)/(75-85) 140/79  SpO2:  [92 %-97 %] 93 %    Physical Exam:    General: No acute distress.   HEENT:  EOMI, PERRLA, OP clear  Respiratory: Clear to auscultation bilaterally. No wheezes. No rhonchi.  Cardiovascular: S1, S2. Regular rate and rhythm. No murmurs.  Abdomen: Soft, nontender, nondistended.  Positive bowel sounds. No rebound or guarding.  Extremities: No edema.  Neuro:  Grossly non focal, no motor deficits.        Diagnostic Data:    Labs:  Recent Labs   Lab 25  0543   WBC 6.8 6.6   HGB 13.9 12.8*   MCV 85.9 82.5   .0 184.0   INR 1.06  --        Recent Labs   Lab 25  0543   GLU 93 105*   BUN 16 12   CREATSERUM 1.15 0.97   CA 10.2 10.0   ALB 4.3 3.9    140   K 4.4 3.9    106   CO2 25.0 25.0   ALKPHO 55 56   AST 17 15   ALT 14 10   BILT 0.8 0.6   TP 7.0 6.2       Estimated Creatinine Clearance: 62.5 mL/min (based on SCr of 0.97 mg/dL).    Recent Labs   Lab 25   PTP 13.9   INR 1.06            COVID-19 Lab Results    COVID-19  Lab Results   Component Value Date    COVID19 Not Detected 2025       Pro-Calcitonin  No results for input(s): \"PCT\" in the last 168 hours.    Cardiac  No results for input(s): \"TROP\", \"PBNP\" in the last 168  hours.    Creatinine Kinase  No results for input(s): \"CK\" in the last 168 hours.    Inflammatory Markers  No results for input(s): \"CRP\", \"JOSIAS\", \"LDH\", \"DDIMER\" in the last 168 hours.    Imaging: Imaging data reviewed in Epic.    Medications:    lisinopril (Zestril) 10 mg, hydroCHLOROthiazide 12.5 mg for Zestoretic 10-12.5 ([facility] only)   Oral Daily    cholecalciferol  2,000 Units Oral Daily    atorvastatin  10 mg Oral Nightly    metoprolol succinate ER  25 mg Oral Daily       Assessment & Plan:    Robe Nunez is a 74 year old male with PMH sig for HTN, DL, depression, presents to ED for drug overdose.     Drug overdose w/ benzos - intentional, suicide attempt  Depression  -monitor on tele  -suicide precautions  -sitter  -supportive measures  -holding psych meds  -Psych consult > plan for inpt psych stay     Urinary retention  BPH sp cysto and TURB neck 11/29/24  -connolly placed in ED  -hematuria afterwards  -UA neg  -urology consulted >> apprec recs >> connolly in >> voiding trial >> voiding well on his own  -per urology Dr Hoffman advised pt he may have some incontinance and need pads/depends     HTN  DL  -resume home meds  -monitor on tele              Quality:  DVT Prophylaxis: lovenox, scds  CODE status: full code  Connolly: yes  If COVID testing is negative, may discontinue isolation: yes      DISPO  DC planning in process  Plan to [facility] to [facility] - for inpt psych stay  OK for dc to [facility] inpt psych stay from medical standpoint  Ok to go from urology standpoint as well    Plan of care discussed with patient and RN - all questions answered.           Jenna Serrano MD  [facility] Hospitalist  Pager [phone]  Answering Service number: [phone]

## 2025-03-24 NOTE — PROGRESS NOTES
[facility]   part of [facility]    Progress Note    Robe Nunez Patient Status:  Observation    1950 MRN [id number]   Location [facility] 3NE-A Attending Francisco J Sharma MD   Hosp Day # 0 PCP Mango Boland MD     Subjective:   Robe Nunez is a(n) 74 year old male urinating well without connolly  PVR 11  Planning transfer to [facility] - awaiting authorization    Objective:   Blood pressure 148/79, pulse 86, temperature 98 °F (36.7 °C), temperature source Oral, resp. rate 18, height 5' 7\" (1.702 m), weight 175 lb 8 oz (79.6 kg), SpO2 100%.    General appearance: alert, appears stated age, and cooperative  Abdominal: soft, non-tender; bowel sounds normal; no masses,  no organomegaly  : no indwelling connolly    Results:   Lab Results   Component Value Date    WBC 6.6 2025    HGB 12.8 (L) 2025    HCT 36.4 (L) 2025    .0 2025    CREATSERUM 0.97 2025    BUN 12 2025     2025    K 3.9 2025     2025    CO2 25.0 2025     (H) 2025    CA 10.0 2025    ALB 3.9 2025    ALKPHO 56 2025    BILT 0.6 2025    TP 6.2 2025    AST 15 2025    ALT 10 2025    PTT 27.4 2025    INR 1.06 2025    TSH 0.886 2024    PSA 3.90 2021    MG 1.9 2025    ETOH <3 2025       CT BRAIN OR HEAD (CPT=70450)    Result Date: 3/22/2025  CONCLUSION: 1. No acute intracranial findings 2. Cerebral atrophy with chronic microvascular ischemic changes.    LOCATION:  [place]   Dictated by (CST): Conchita Thompson MD on 3/22/2025 at 12:59 PM     Finalized by (CST): Conchita Thompson MD on 3/22/2025 at 1:00 PM       XR CHEST AP PORTABLE  (CPT=71045)    Result Date: 3/22/2025  CONCLUSION:  No acute pulmonary findings.   LOCATION:  [facility]      Dictated by (CST): Conchita Thompson MD on 3/22/2025 at 8:08 AM     Finalized by (CST): Conchita Thompson MD on 3/22/2025 at 8:11 AM             Assessment & Plan:     Benzodiazepine overdose of undetermined intent, initial encounter    Severe episode of recurrent major depressive disorder, without psychotic features (HCC)    Insomnia due to other mental disorder  URINE RETENTION   BPH S/P AQUABLATION 11/2024    Voiding well without connolly  Retention possibly secondary to sedation  Recommend outpt bladder scan with urology  Reassurance if scant intermittent hematuria over the next 7 days due to recent catheterization  Urology to sign off- please call should his  status change    Celia Carrillo PA-C  3/24/2025

## 2025-03-24 NOTE — DISCHARGE INSTRUCTIONS
It is expected to see scant blood in urine for up to 7 days due to your recent catheterization. Push fluids if urine is ever red tinged.     Inpatient Program [facility]    Geriatric Program    [facility], [address] [place] [address], [place] [address]      What to bring: One plastic bag (no larger than the size of a carry-on) packed with:  Three changes of clothing, undergarments, and socks. Strings must be removed from all clothing.  Sweatshirt or sweater  Sleepwear  Necessary toiletries We do NOT allow:   Suitcases  Strings  Metal  Glass  Electronics   Pillows, blankets, linens, towels  Stuffed animals  Cell phones       Patient phone hours: Monday - Sunday  7:30 am - 10 pm     Patients cannot make or receive phone calls during group sessions. All calls are monitored by staff. Patients create and provide a confidentiality code to their approved callers. Without this code, staff cannot confirm whether a patient is admitted to our facility. To speak with a patient, call the nurse’s station at [phone] and provide the confidentiality code.     Visiting hours: The unit will provide a schedule.   Schedule overview: Typical daily activities:  Therapeutic group(s)  Psychoeducational group(s)  Expressive therapy group(s)  Meals and free/break time Extra treatment sessions (scheduled as needed):  Individual sessions: Occurs typically 1-2 times weekly with the clinical therapist.  Support meetings: Usually occurs within 72 hours of admission and as needed with the patient/family and the clinical therapist.         Important contact information: Treatment information/questions:   Clinical therapist (CT)- Contact number provided in program   Medical provider- Contact the nurse’s station  Nurse’s station: [phone]  Insurance information/questions: Business Office, [phone]        PROGRAM DETAILS  Program length: 12-14 days is average (depends on patient need)    Admission criteria: Patients 60 years of age  and older experiencing mental health issues who require 24-hour nursing care in a secure environment. When cognitively able, patients take an active role in treatment and assert needs to treatment team, respect peers and staff, attend and participate in programming and therapeutic activities. If the patient is experiencing a decline in function, staff are present to provide support.    ADDITIONAL PROGRAM DETAILS  Discharge preparation:   Prior to discharge, patients are required to schedule an appointment with an outpatient therapist. Patients on a psychotropic medication are also required to make an appointment with an outpatient medical provider (APN, PA or psychiatrist).     Treatment team:  Clinical therapist (CT), medical provider (advanced practice nurse (APN), physician’s assistant (PA) and/or psychiatrist), behavioral health associate (BHA), registered nurse (RN), unit specialist (US), and clinical supervisor.  The patient’s primary point of contact is the clinical therapist(s) who will provide the patient with a direct contact number and the medical provider who can be contacted through the nurse’s station.   Additional team members: Registered dietitian, post-doctoral fellow, chemical dependency counselor, expressive therapist and/or discharge planner.    First day(s)/getting used to the program:   Patients may feel uncomfortable or overwhelmed and should give themselves time to get used to the group.  Within the first two days of program (often the first day), the patient is removed from group to complete the following assessments:    Nursing assessment: Nurse discusses medical history and/or any current medical concerns.  Psychiatric evaluation: Medical Provider discusses patient’s medical and treatment history to determine diagnoses and medical needs.   Psychosocial: Clinical therapist discusses patient’s history and a safety plan.     Family & Medical Leave Act (FMLA) paperwork: FMLA paperwork must be  turned into staff located at the nurse’s station who will contact the medical provider to complete (process may take a few days).    Medication/medical concerns:  Our pharmacy provides patients any medications prescribed by the patient’s medical provider and/or internist.   If our pharmacy does not carry a required patient medication, patients can ask someone to bring a new, unopened bottle of the medication to the hospital to be identified by our pharmacy and distributed by our nurses.    CAMPUS POLICIES  Patients are NOT allowed to:  Leave building   Contact (call, text, social media interaction, or meet) other patients outside of program  Smoke or vape (smoke-free campus)    Dress code:  Casual clothes that are comfortable for the season.   The following clothing items are NOT allowed:  Clothing that refers to sex, drugs, or illegal substances  Sleeveless/tank tops  Hoods  Hats  Shorts above mid-thigh  Clothing that exposes the patient’s stomach, shoulders or undergarments

## 2025-03-24 NOTE — BH PROGRESS NOTE
This MHCCM/psych liaison spoke with pt and pt's daughter at bedside. Informed family pt will transfer to [facility] via [facility] this afternoon. Provided handout on belongings.    [facility]: 3pm pickup  PCS form: completed  Petition and Certificate: completed, scanned into electronic record    Envelope provided to pt's RNLaya with the following: facesheet, PCS form, Petition, and Certificate

## 2025-03-24 NOTE — BH PROGRESS NOTE
3/24/2025    Seen in person at [facility]    Interval Chief Complaint: Follow-up on depression, anxiety, insomnia    Subjective:  Admitted over the weekend following intentional overdose on benzodiazepines after being found unresponsive by family.  He has been medically stabilized and the plan is for him to discharge to [facility] today voluntarily.  He does acknowledge taking the pills and does acknowledge feeling more depressed.  He is known to me from prior admission at [facility] back in November and at that time was stabilized on Cymbalta.  He did start ECT as an outpatient with his last treatment being in January and had roughly 10 or 11 treatments and did feel like they were helpful but then started to not want to go to them and stopped going since late January.  His family did request to have them restarted and he was supposed to start tomorrow but then made this overdose.  He does feel like his mood is worse today compared to yesterday and feels it may be related to not having his psychotropics.  He is able contract to safety and regrets taking the pills.  His family is at bedside as well and to support him going to [facility] today as well to continue his treatment.  All questions were answered to their satisfaction and he is aware and agreeable with the plan of care.    MEDS:    [COMPLETED] magnesium oxide (Mag-Ox) tab 400 mg  400 mg Oral Once    polyethylene glycol (PEG 3350) (Miralax) 17 g oral packet 17 g  17 g Oral Daily PRN    lisinopril (Zestril) 10 mg, hydroCHLOROthiazide 12.5 mg for Zestoretic 10-12.5 ([facility] only)   Oral Daily    cholecalciferol (Vitamin D3) tab 2,000 Units  2,000 Units Oral Daily    atorvastatin (Lipitor) tab 10 mg  10 mg Oral Nightly    acetaminophen (Tylenol Extra Strength) tab 500 mg  500 mg Oral Q4H PRN    metoprolol succinate ER (Toprol XL) 24 hr tab 25 mg  25 mg Oral Daily    [COMPLETED] sodium chloride 0.9 % IV bolus 1,000 mL  1,000 mL Intravenous Once         Vitals:    03/24/25 1457   BP:    Pulse: 91   Resp: 10   Temp:         ROS:   As above. Otherwise negative on 14 system exam.    MSE:   Conscious/Orientation: A + O x person, place, time, situation  Appearance: good grooming  Behavior: no psychomotor abnormalities, good eye contact and engagement with interview.  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     Plan:  discharge to [facility] today  Medication reconciliation completed  Certificate completed  Case discussed with nursing staff as well as [facility] staff  Patient will receive ECT tomorrow and agrees to resume it  Restart home psychiatric meds  Approximately 35 minutes total time spent in case of greater than 50% time spent in counseling and coordination of care    German Malave DO  Board Certified Adult and Geriatric Psychiatrist  Medical Director, Geriatric Psychiatry, [facility]   Medical Director, Psychiatric Consultation Service, [facility]

## 2025-03-24 NOTE — CERTIFICATION
Ref: [facility] 5/3-403, 5/3-602, 5/3-607, 5/3-610    5/3-702, 5/3-813, 5/4-306, 5/4-402, 5/4-403    5/4-405, 5/4-501, 5/4-611, 5/4-705   Inpatient Certificate  Re: Robe Nunez    (name)     I personally informed the above-named individual of the purpose of this examination and that he or she did not have to speak to me, and that any statements made might be related in court as to the individual's clinical condition or need for services.  Additionally, if this examination was for the purpose of determining that the above-named individual is developmentally disabled and dangerous, I informed the individual of his or her right to speak with a relative, friend or  before the examination, and of his or her right to have an  appointed for him or her if he or she so desired.     Electronically signed by German Malave DO    Signature of Examiner     On 3/24 , 2025 , at 1215  [] a.m. [x] p.m.,  I personally examined the    (date)  (year)  (time)    above-named individual. The examination was conducted in person at [facility].  Or   [] Via Interactive Communication System (telepsychiatry)      Based on the foregoing examination, it is my opinion that he or she is:  [x]  A person with mental illness who, because of his or her illness is reasonably expected, unless treated on an inpatient basis, to engage in conduct placing such person or another in physical harm or in reasonable expectation of being physically harmed;   []  A person with mental illness who, because of his or her illness is unable to provide for his or her basic physical needs so as to guard himself or herself from serious harm, without the assistance of family or others, unless treated on an inpatient basis;   []  A person with mental illness who: refuses treatment or is not adhering adequately to prescribed treatment; because of the nature of his or her illness is unable to understand his or her need for treatment; and if  not treated on an inpatient basis, is reasonably expected based on his or her behavioral history, to suffer mental or emotional deterioration and is reasonably expected, after such deterioration, to meet the criteria of either paragraph one or paragraph two above;   []  An individual who is developmentally disabled and unless treated on an in-patient basis is reasonably expected to inflict serious physical harm upon himself or herself or others in the near future, and/or   [x]  Is in need of immediate hospitalization for the prevention of such harm.   I base my opinion on the following (including clinical observations, factual information):  Recent overdose with intent to end life.   I believe that the individual is subject to: []  Involuntary inpatient admission and is not in need of immediate hospitalization   (check one) [x]  Involuntary inpatient admission and is in need of immediate hospitalization    []  Judicial inpatient admission and is not in need of immediate hospitalization    []  Judicial inpatient admission and is in need of immediate hospitalization     Date: 3/24/2025 Signature: Electronically signed by German Malave DO   Title: DO Printed Name: German Malave DO     -2006 (R-12-22) Inpatient Certificate  Printed by Authority of the [facility] -0- Copies Page 1 of 1

## 2025-03-24 NOTE — PROGRESS NOTES
NURSING DISCHARGE NOTE    Discharged  [facility] via Ambulance.  Accompanied by Support staff  Belongings Taken by patient/family.    Pt discharged in stable condition. IV and tele removed. Report given to RN at [facility].

## 2025-03-24 NOTE — PLAN OF CARE
Patient A&O x4, room air, no c/o pain.  Improved movement with minimal dizziness.  Patient urinating well since DC of connolly.  Awaiting insurance auth.

## 2025-03-25 ENCOUNTER — HOSPITAL ENCOUNTER (OUTPATIENT)
Dept: POSTOP/PACU | Facility: HOSPITAL | Age: 75
Discharge: HOME | End: 2025-03-25
Attending: Other
Payer: MEDICARE

## 2025-03-25 ENCOUNTER — ANESTHESIA (OUTPATIENT)
Dept: POSTOP/PACU | Facility: HOSPITAL | Age: 75
End: 2025-03-25
Payer: MEDICARE

## 2025-03-25 ENCOUNTER — ANESTHESIA EVENT (OUTPATIENT)
Dept: POSTOP/PACU | Facility: HOSPITAL | Age: 75
End: 2025-03-25
Payer: MEDICARE

## 2025-03-25 VITALS
DIASTOLIC BLOOD PRESSURE: 73 MMHG | OXYGEN SATURATION: 93 % | RESPIRATION RATE: 16 BRPM | TEMPERATURE: 98 F | SYSTOLIC BLOOD PRESSURE: 128 MMHG | HEART RATE: 79 BPM

## 2025-03-25 DIAGNOSIS — F33.2 SEVERE RECURRENT MAJOR DEPRESSION WITHOUT PSYCHOTIC FEATURES (HCC): ICD-10-CM

## 2025-03-25 RX ORDER — HYDRALAZINE HYDROCHLORIDE 20 MG/ML
INJECTION INTRAMUSCULAR; INTRAVENOUS
Status: COMPLETED
Start: 2025-03-25 | End: 2025-03-25

## 2025-03-25 RX ORDER — KETAMINE HYDROCHLORIDE 50 MG/ML
INJECTION, SOLUTION INTRAMUSCULAR; INTRAVENOUS AS NEEDED
Status: DISCONTINUED | OUTPATIENT
Start: 2025-03-25 | End: 2025-03-25 | Stop reason: SURG

## 2025-03-25 RX ORDER — CAFFEINE AND SODIUM BENZOATE 125 MG/ML
INJECTION, SOLUTION INTRAMUSCULAR; INTRAVENOUS
Status: COMPLETED
Start: 2025-03-25 | End: 2025-03-25

## 2025-03-25 RX ORDER — HYDRALAZINE HYDROCHLORIDE 20 MG/ML
10 INJECTION INTRAMUSCULAR; INTRAVENOUS ONCE
Status: COMPLETED | OUTPATIENT
Start: 2025-03-25 | End: 2025-03-25

## 2025-03-25 RX ORDER — LABETALOL HYDROCHLORIDE 5 MG/ML
INJECTION, SOLUTION INTRAVENOUS AS NEEDED
Status: DISCONTINUED | OUTPATIENT
Start: 2025-03-25 | End: 2025-03-25 | Stop reason: SURG

## 2025-03-25 RX ORDER — KETOROLAC TROMETHAMINE 30 MG/ML
INJECTION, SOLUTION INTRAMUSCULAR; INTRAVENOUS
Status: COMPLETED
Start: 2025-03-25 | End: 2025-03-25

## 2025-03-25 RX ORDER — SODIUM CHLORIDE, SODIUM LACTATE, POTASSIUM CHLORIDE, CALCIUM CHLORIDE 600; 310; 30; 20 MG/100ML; MG/100ML; MG/100ML; MG/100ML
INJECTION, SOLUTION INTRAVENOUS CONTINUOUS
Status: DISCONTINUED | OUTPATIENT
Start: 2025-03-25 | End: 2025-03-27

## 2025-03-25 RX ORDER — ETOMIDATE 2 MG/ML
INJECTION INTRAVENOUS AS NEEDED
Status: DISCONTINUED | OUTPATIENT
Start: 2025-03-25 | End: 2025-03-25 | Stop reason: SURG

## 2025-03-25 RX ORDER — ONDANSETRON 2 MG/ML
8 INJECTION INTRAMUSCULAR; INTRAVENOUS ONCE
Status: COMPLETED | OUTPATIENT
Start: 2025-03-25 | End: 2025-03-25

## 2025-03-25 RX ORDER — KETOROLAC TROMETHAMINE 30 MG/ML
15 INJECTION, SOLUTION INTRAMUSCULAR; INTRAVENOUS ONCE
Status: COMPLETED | OUTPATIENT
Start: 2025-03-25 | End: 2025-03-25

## 2025-03-25 RX ORDER — MIDAZOLAM HYDROCHLORIDE 1 MG/ML
1 INJECTION INTRAMUSCULAR; INTRAVENOUS EVERY 5 MIN PRN
Status: DISCONTINUED | OUTPATIENT
Start: 2025-03-25 | End: 2025-03-25 | Stop reason: HOSPADM

## 2025-03-25 RX ORDER — ONDANSETRON 2 MG/ML
INJECTION INTRAMUSCULAR; INTRAVENOUS
Status: COMPLETED
Start: 2025-03-25 | End: 2025-03-25

## 2025-03-25 RX ORDER — SODIUM CHLORIDE, SODIUM LACTATE, POTASSIUM CHLORIDE, CALCIUM CHLORIDE 600; 310; 30; 20 MG/100ML; MG/100ML; MG/100ML; MG/100ML
INJECTION, SOLUTION INTRAVENOUS CONTINUOUS
Status: DISCONTINUED | OUTPATIENT
Start: 2025-03-25 | End: 2025-03-25 | Stop reason: HOSPADM

## 2025-03-25 RX ORDER — ACETAMINOPHEN 500 MG
1000 TABLET ORAL ONCE AS NEEDED
Status: DISCONTINUED | OUTPATIENT
Start: 2025-03-25 | End: 2025-03-25 | Stop reason: HOSPADM

## 2025-03-25 RX ORDER — CAFFEINE AND SODIUM BENZOATE 125 MG/ML
750 INJECTION, SOLUTION INTRAMUSCULAR; INTRAVENOUS ONCE
Status: COMPLETED | OUTPATIENT
Start: 2025-03-25 | End: 2025-03-25

## 2025-03-25 RX ORDER — NALOXONE HYDROCHLORIDE 0.4 MG/ML
80 INJECTION, SOLUTION INTRAMUSCULAR; INTRAVENOUS; SUBCUTANEOUS AS NEEDED
Status: DISCONTINUED | OUTPATIENT
Start: 2025-03-25 | End: 2025-03-25 | Stop reason: HOSPADM

## 2025-03-25 RX ORDER — LABETALOL HYDROCHLORIDE 5 MG/ML
5 INJECTION, SOLUTION INTRAVENOUS EVERY 5 MIN PRN
Status: DISCONTINUED | OUTPATIENT
Start: 2025-03-25 | End: 2025-03-25 | Stop reason: HOSPADM

## 2025-03-25 RX ADMIN — ETOMIDATE 6 MG: 2 INJECTION INTRAVENOUS at 07:31:00

## 2025-03-25 RX ADMIN — SODIUM CHLORIDE, SODIUM LACTATE, POTASSIUM CHLORIDE, CALCIUM CHLORIDE: 600; 310; 30; 20 INJECTION, SOLUTION INTRAVENOUS at 07:45:00

## 2025-03-25 RX ADMIN — KETAMINE HYDROCHLORIDE 25 MG: 50 INJECTION, SOLUTION INTRAMUSCULAR; INTRAVENOUS at 07:30:00

## 2025-03-25 RX ADMIN — LABETALOL HYDROCHLORIDE 5 MG: 5 INJECTION, SOLUTION INTRAVENOUS at 07:30:00

## 2025-03-25 RX ADMIN — SODIUM CHLORIDE, SODIUM LACTATE, POTASSIUM CHLORIDE, CALCIUM CHLORIDE: 600; 310; 30; 20 INJECTION, SOLUTION INTRAVENOUS at 06:34:00

## 2025-03-25 RX ADMIN — KETOROLAC TROMETHAMINE 15 MG: 30 INJECTION, SOLUTION INTRAMUSCULAR; INTRAVENOUS at 06:38:00

## 2025-03-25 RX ADMIN — ETOMIDATE 14 MG: 2 INJECTION INTRAVENOUS at 07:30:00

## 2025-03-25 RX ADMIN — HYDRALAZINE HYDROCHLORIDE 10 MG: 20 INJECTION INTRAMUSCULAR; INTRAVENOUS at 06:41:00

## 2025-03-25 RX ADMIN — ONDANSETRON 8 MG: 2 INJECTION INTRAMUSCULAR; INTRAVENOUS at 06:34:00

## 2025-03-25 RX ADMIN — CAFFEINE AND SODIUM BENZOATE 750 MG: 125 INJECTION, SOLUTION INTRAMUSCULAR; INTRAVENOUS at 06:39:00

## 2025-03-25 RX ADMIN — SODIUM CHLORIDE, SODIUM LACTATE, POTASSIUM CHLORIDE, CALCIUM CHLORIDE: 600; 310; 30; 20 INJECTION, SOLUTION INTRAVENOUS at 07:22:00

## 2025-03-25 NOTE — ANESTHESIA POSTPROCEDURE EVALUATION
[facility]    Robe Nunez Patient Status:  Outpatient   Age/Gender 74 year old male MRN [id number]   Location [facility] POST ANESTHESIA CARE UNIT Attending Bryn Taylor MD   Hosp Day # 0 PCP Mango Boland MD       Anesthesia Post-op Note        Procedure Summary       Date: 03/25/25 Room / Location: [facility] Post Anesthesia Care Unit    Anesthesia Start: 0722 Anesthesia Stop:     Procedure: ECT(BEDSIDE) Diagnosis: Severe recurrent major depression without psychotic features (HCC)    Scheduled Providers:  Anesthesiologist: Kevin Myers MD    Anesthesia Type: general ASA Status: 2            Anesthesia Type: general    Vitals Value Taken Time   /84 03/25/25 0740        Pulse 77 03/25/25 0740   Resp 16 03/25/25 0740   SpO2 95% 03/25/25 0740           Patient Location: PACU    Anesthesia Type: general    Airway Patency: patent    Postop Pain Control: adequate    Mental Status: mildly sedated but able to meaningfully participate in the post-anesthesia evaluation    Nausea/Vomiting: none    Cardiopulmonary/Hydration status: stable euvolemic    Complications: no apparent anesthesia related complications    Postop vital signs: stable    Dental Exam: Unchanged from Preop    Patient to be discharged from PACU when criteria met.

## 2025-03-25 NOTE — ANESTHESIA PREPROCEDURE EVALUATION
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  Temp: 98.9 °F (37.2 °C)  Pulse: 85  Resp: 15  BP: 131/78  SpO2: 96 %  There is no height or weight on file to calculate BMI.    Current medications reviewed.  Hospital Medications:   lactated ringers infusion   Intravenous Continuous    [COMPLETED] ketorolac (Toradol) 30 MG/ML injection 15 mg  15 mg Intravenous Once    [COMPLETED] ondansetron (Zofran) 4 MG/2ML injection 8 mg  8 mg Intravenous Once    [COMPLETED] caffeine-sodium benzoate 125-125 mg/mL injection  750 mg Intravenous Once    [COMPLETED] hydrALAzine (Apresoline) 20 mg/mL injection 10 mg  10 mg Intravenous Once    acetaminophen (Tylenol Extra Strength) tab 1,000 mg  1,000 mg Oral See Admin Instructions    DULoxetine (Cymbalta) DR cap 30 mg  30 mg Oral Daily    [START ON 3/26/2025] DULoxetine (Cymbalta) DR cap 60 mg  60 mg Oral Daily    QUEtiapine (SEROquel) tab 50 mg  50 mg Oral Nightly    LORazepam (Ativan) tab 0.5 mg  0.5 mg Oral Q4H PRN    Or    LORazepam (Ativan) 2 mg/mL injection 0.5 mg  0.5 mg Intramuscular Q4H PRN    temazepam (Restoril) cap 15 mg  15 mg Oral Nightly PRN    haloperidol (Haldol) tab 0.5 mg  0.5 mg Oral Q4H PRN    Or    haloperidol lactate (Haldol) 5 MG/ML injection 0.5 mg  0.5 mg Intramuscular Q4H PRN    benztropine (Cogentin) tab 0.5 mg  0.5 mg Oral Q4H PRN    Or    benztropine mesylate (Cogentin) 1 mg/mL injection 0.5 mg  0.5 mg Intramuscular Q4H PRN    alum-mag hydroxide-simethicone (Maalox) 200-200-20 MG/5ML oral suspension 30 mL  30 mL Oral Q4H PRN    acetaminophen (Tylenol) tab 325 mg  325 mg Oral Q4H PRN    Or    acetaminophen (Tylenol) tab 650 mg  650 mg Oral Q4H PRN    docusate sodium (Colace) cap 100 mg  100 mg Oral BID PRN    polyethylene glycol  (PEG 3350) (Miralax) 17 g oral packet 17 g  17 g Oral Daily PRN    magnesium hydroxide (Milk of Magnesia) 400 MG/5ML oral suspension 30 mL  30 mL Oral Daily PRN    bisacodyl (Dulcolax) 10 MG rectal suppository 10 mg  10 mg Rectal Daily PRN    fleet enema (Fleet) rectal enema 133 mL  1 enema Rectal Once PRN    metoprolol succinate ER (Toprol XL) 24 hr tab 25 mg  25 mg Oral Daily    acidophilus (Probiotic) cap/tab 1 capsule  1 capsule Oral QAM    atorvastatin (Lipitor) tab 10 mg  10 mg Oral Nightly    cholecalciferol (Vitamin D3) tab 2,000 Units  2,000 Units Oral Daily    lisinopril (Zestril) 10 mg, hydroCHLOROthiazide 12.5 mg for Zestoretic 10-12.5 ([facility] only)   Oral Daily       Outpatient Medications:   Prescriptions Prior to Admission[1]    Allergies: Other and Seasonal      Anesthesia Evaluation    Patient summary reviewed.    Anesthetic Complications           GI/Hepatic/Renal    Negative GI/hepatic/renal ROS.                             Cardiovascular            MET: >4      (+) hypertension                                     Endo/Other    Negative endo/other ROS.                              Pulmonary    Negative pulmonary ROS.                       Neuro/Psych      (+) depression                                Past Surgical History:   Procedure Laterality Date    Colonoscopy & polypectomy  05/09/2016    diverticulosis and a small polyp was removed; ASC    Colonoscopy,biopsy N/A 05/09/2016    Procedure: COLONOSCOPY, POSSIBLE BIOPSY, POSSIBLE POLYPECTOMY 82845;  Surgeon: Shaun Mock MD;  Location: [facility]    Other surgical history      scope knee    Other surgical history  11/29/2024    Aquablation of the prostate    Patient documented not to have experienced any of the following events N/A 05/09/2016    Procedure: COLONOSCOPY, POSSIBLE BIOPSY, POSSIBLE POLYPECTOMY 72626;  Surgeon: Shaun Mock MD;  Location: [facility]    Patient withough preoperative order for iv  antibiotic surgical site infection prophylaxis. N/A 05/09/2016    Procedure: COLONOSCOPY, POSSIBLE BIOPSY, POSSIBLE POLYPECTOMY 23109;  Surgeon: Shaun Mock MD;  [facility]    Special service or report  1990s    R knee arthroscopy lat meniscus     Social History     Socioeconomic History    Marital status:    Tobacco Use    Smoking status: Never    Smokeless tobacco: Never   Vaping Use    Vaping status: Unknown   Substance and Sexual Activity    Alcohol use: No     Alcohol/week: 0.0 - 1.0 standard drinks of alcohol     Comment: rare    Drug use: No    Sexual activity: Yes     History   Drug Use No     Available pre-op labs reviewed.  Lab Results   Component Value Date    WBC 6.6 03/23/2025    RBC 4.41 03/23/2025    HGB 12.8 (L) 03/23/2025    HCT 36.4 (L) 03/23/2025    MCV 82.5 03/23/2025    MCH 29.0 03/23/2025    MCHC 35.2 03/23/2025    RDW 12.2 03/23/2025    .0 03/23/2025     Lab Results   Component Value Date     03/23/2025    K 3.9 03/23/2025     03/23/2025    CO2 25.0 03/23/2025    BUN 12 03/23/2025    CREATSERUM 0.97 03/23/2025     (H) 03/23/2025    CA 10.0 03/23/2025     Lab Results   Component Value Date    INR 1.06 03/21/2025         Airway      Mallampati: II  Mouth opening: >3 FB  TM distance: > 6 cm  Neck ROM: full Cardiovascular    Cardiovascular exam normal.  Rhythm: regular  Rate: normal     Dental    Dentition appears grossly intact         Pulmonary    Pulmonary exam normal.  Breath sounds clear to auscultation bilaterally.               Other findings              ASA: 2   Plan: general  NPO status verified and patient meets guidelines.    Post-procedure pain management plan discussed with surgeon and patient.      Plan/risks discussed with: patient  Use of blood product(s) discussed with: patient              Present on Admission:  **None**           [1] (Not in a hospital admission)

## 2025-03-25 NOTE — PROGRESS NOTES
[facility] / [facility]  ECT History & Physical    Robe Nunez Patient Status:  Outpatient   Age/Gender 74 year old male MRN [id number]   Location [facility] POST ANESTHESIA CARE UNIT Attending Bryn Taylor MD   Hosp Day # 0 PCP Mango Boland MD     Date of Service: 3/25/2025    Diagnosis:  Major Depression Recurrent Severe Without Psychotic Features. F33.2    Procedure:  Bitemporal    HPI: Worsening depression with recent overdose.  No physical complaints      Medical History:  Past Medical History:    Cancer (HCC)    Skin cancer    Depression    Headache disorder    High blood pressure    High cholesterol    HTN (hypertension)    Hyperlipidemia    Nonspecific elevation of levels of transaminase or lactic acid dehydrogenase (LDH)    s/p liver biopsy normal    Visual impairment    glasses       Surgical History:  Past Surgical History:   Procedure Laterality Date    Colonoscopy & polypectomy  05/09/2016    diverticulosis and a small polyp was removed; ASC    Colonoscopy,biopsy N/A 05/09/2016    Procedure: COLONOSCOPY, POSSIBLE BIOPSY, POSSIBLE POLYPECTOMY 75264;  Surgeon: Shaun Mock MD;  Location: [facility]    Other surgical history      scope knee    Other surgical history  11/29/2024    Aquablation of the prostate    Patient documented not to have experienced any of the following events N/A 05/09/2016    Procedure: COLONOSCOPY, POSSIBLE BIOPSY, POSSIBLE POLYPECTOMY 63050;  Surgeon: Shaun Mock MD;  Location: [facility]    Patient withough preoperative order for iv antibiotic surgical site infection prophylaxis. N/A 05/09/2016    Procedure: COLONOSCOPY, POSSIBLE BIOPSY, POSSIBLE POLYPECTOMY 84992;  Surgeon: Shaun Mock MD;  Location: [facility]    Special service or report  1990s    R knee arthroscopy lat meniscus       Family History:  Family History   Problem Relation Age of Onset    Hypertension Father     Diabetes Father     Lipids  Brother     Cancer Brother         prostate ca    Other (Other[other]) Sister         ?sle       Social History:  Social History     Socioeconomic History    Marital status:      Spouse name: Not on file    Number of children: Not on file    Years of education: Not on file    Highest education level: Not on file   Occupational History    Not on file   Tobacco Use    Smoking status: Never    Smokeless tobacco: Never   Vaping Use    Vaping status: Unknown   Substance and Sexual Activity    Alcohol use: No     Alcohol/week: 0.0 - 1.0 standard drinks of alcohol     Comment: rare    Drug use: No    Sexual activity: Yes   Other Topics Concern    Not on file   Social History Narrative    marital status:     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[address]    caffeine: few cups    blood tranfusion: no    hiv exposure: no    travel: domestic    exercise: health club 2 x week    mammo:      dexa:      pap:      colonoscopy: 2006 ok    lab: 2012, 2013,2014    prostate: 2013,2014    testicles: 2013,2013    influenza: 2012,2013    bdt: yes    pneumovax: no    zoster: 2013                         Social Drivers of Health     Food Insecurity: No Food Insecurity (3/22/2025)    NCSS - Food Insecurity     Worried About Running Out of Food in the Last Year: No     Ran Out of Food in the Last Year: No   Transportation Needs: No Transportation Needs (3/22/2025)    NCSS - Transportation     Lack of Transportation: No   Housing Stability: Not At Risk (3/22/2025)    NCSS - Housing/Utilities     Has Housing: Yes     Worried About Losing Housing: No     Unable to Get Utilities: No       ROS:  unremarkable    Physical Exam:   /78 (BP Location: Right arm)   Pulse 85   Temp 98.9 °F (37.2 °C) (Temporal)   Resp 15   SpO2 96%     General Appearance:    Alert, cooperative, no distress, appears stated age   Head:    Normocephalic, without obvious abnormality, atraumatic   Eyes:    PERRL, conjunctiva/corneas clear, EOM's intact   Nose:    Nares normal, septum midline, mucosa normal, no drainage    or sinus tenderness   Throat:   Lips, mucosa, and tongue normal; teeth and gums normal   Neck:   Supple, symmetrical, trachea midline   Lungs:     Clear to auscultation bilaterally, respirations unlabored    Heart:    Regular rate and rhythm, S1 and S2 normal, no murmur, rub or gallop   Abdomen:     Soft, non-tender, bowel sounds active all four quadrants,     no masses, no organomegaly   Extremities:   Extremities normal, atraumatic, no cyanosis or edema   Pulses:   2+ and symmetric all extremities   Skin:   Skin color, texture, turgor normal, no rashes or lesions   Neurologic:   CNII-XII intact, normal strength, sensation and reflexes     throughout     Impressions & Plans:    Diagnosis:  Major Depression Recurrent Severe Without Psychotic Features. F33.2    Procedure:  Bitemporal    I have discussed the risks and benefits and alternatives with the patient/family.  They understand and agree to proceed with plan of care.    Frances Sung MD  3/25/2025

## 2025-03-25 NOTE — DISCHARGE INSTRUCTIONS
Discharge Instructions  Electroconvulsive Therapy    Activities:  You MUST arrange to have a responsible adult drive you home and have a responsible adult stay with you the rest of the day and overnight.  Do not drive today.  Do not operate any machinery today. Use kitchen equipment with caution.  Rest and take it easy today.  Do not take public transportation without the presence of another responsible adult for 24 hours    Medications:  Resume your regular medications when you get home  The nurse will instruct you not to take any NSAIDS (Advil, Aleve, Motrin, Ibuprofen) before 1 pm today because you were given a certain medication during the procedure.    Diet:  You may resume your regular diet when you get home  Do not drink alcohol for 24 hours    Additional Instructions:  Someone should call you to schedule any upcoming ECT treatments. Call as needed to schedule or cancel your ECT appointments [phone].  If you have any questions or concerns, please call your own psychiatrist.  For your safety, please do not wear make-up to any future ECT appointments.  For any questions regarding your ECT appointment, please call [facility] [phone].  For cancellations after hours, call [phone] and leave a message.    Expected Recovery:  As you awaken, you may experience one or more of the following:  Headache, nausea, temporary confusion, or muscle stiffness.  If these symptoms increase, become severe or are accompanied by a fever of more than 101, please seek medical attention.  The ECT may affect memory.  Many patients report loss of memory for events that occurred in the days, weeks or months surrounding the ECT.  Many of these memories may return, but not always.  Short-term memory may also be affected for months, but this can also be a result of the disorder that you have.

## 2025-03-27 ENCOUNTER — ANESTHESIA (OUTPATIENT)
Dept: POSTOP/PACU | Facility: HOSPITAL | Age: 75
End: 2025-03-27
Payer: MEDICARE

## 2025-03-27 ENCOUNTER — HOSPITAL ENCOUNTER (OUTPATIENT)
Dept: POSTOP/PACU | Facility: HOSPITAL | Age: 75
Discharge: HOME | End: 2025-03-27
Attending: Other
Payer: MEDICARE

## 2025-03-27 ENCOUNTER — ANESTHESIA EVENT (OUTPATIENT)
Dept: POSTOP/PACU | Facility: HOSPITAL | Age: 75
End: 2025-03-27
Payer: MEDICARE

## 2025-03-27 VITALS
WEIGHT: 176.38 LBS | HEART RATE: 89 BPM | RESPIRATION RATE: 26 BRPM | OXYGEN SATURATION: 100 % | BODY MASS INDEX: 27.68 KG/M2 | DIASTOLIC BLOOD PRESSURE: 87 MMHG | SYSTOLIC BLOOD PRESSURE: 171 MMHG | TEMPERATURE: 98 F | HEIGHT: 67 IN

## 2025-03-27 DIAGNOSIS — F33.2 SEVERE RECURRENT MAJOR DEPRESSION WITHOUT PSYCHOTIC FEATURES (HCC): ICD-10-CM

## 2025-03-27 RX ORDER — HYDRALAZINE HYDROCHLORIDE 20 MG/ML
10 INJECTION INTRAMUSCULAR; INTRAVENOUS ONCE
Status: COMPLETED | OUTPATIENT
Start: 2025-03-27 | End: 2025-03-27

## 2025-03-27 RX ORDER — SODIUM CHLORIDE, SODIUM LACTATE, POTASSIUM CHLORIDE, CALCIUM CHLORIDE 600; 310; 30; 20 MG/100ML; MG/100ML; MG/100ML; MG/100ML
INJECTION, SOLUTION INTRAVENOUS CONTINUOUS
Status: DISCONTINUED | OUTPATIENT
Start: 2025-03-27 | End: 2025-03-29

## 2025-03-27 RX ORDER — HYDROMORPHONE HYDROCHLORIDE 1 MG/ML
0.4 INJECTION, SOLUTION INTRAMUSCULAR; INTRAVENOUS; SUBCUTANEOUS EVERY 5 MIN PRN
Status: CANCELLED | OUTPATIENT
Start: 2025-03-27 | End: 2025-03-27

## 2025-03-27 RX ORDER — HYDROMORPHONE HYDROCHLORIDE 1 MG/ML
0.2 INJECTION, SOLUTION INTRAMUSCULAR; INTRAVENOUS; SUBCUTANEOUS EVERY 5 MIN PRN
Status: CANCELLED | OUTPATIENT
Start: 2025-03-27 | End: 2025-03-27

## 2025-03-27 RX ORDER — LABETALOL HYDROCHLORIDE 5 MG/ML
INJECTION, SOLUTION INTRAVENOUS AS NEEDED
Status: DISCONTINUED | OUTPATIENT
Start: 2025-03-27 | End: 2025-03-27 | Stop reason: SURG

## 2025-03-27 RX ORDER — ONDANSETRON 2 MG/ML
8 INJECTION INTRAMUSCULAR; INTRAVENOUS ONCE
Status: COMPLETED | OUTPATIENT
Start: 2025-03-27 | End: 2025-03-27

## 2025-03-27 RX ORDER — CAFFEINE AND SODIUM BENZOATE 125 MG/ML
INJECTION, SOLUTION INTRAMUSCULAR; INTRAVENOUS
Status: COMPLETED
Start: 2025-03-27 | End: 2025-03-27

## 2025-03-27 RX ORDER — KETAMINE HYDROCHLORIDE 50 MG/ML
INJECTION, SOLUTION INTRAMUSCULAR; INTRAVENOUS
Status: COMPLETED
Start: 2025-03-27 | End: 2025-03-27

## 2025-03-27 RX ORDER — HYDRALAZINE HYDROCHLORIDE 20 MG/ML
INJECTION INTRAMUSCULAR; INTRAVENOUS
Status: COMPLETED
Start: 2025-03-27 | End: 2025-03-27

## 2025-03-27 RX ORDER — SODIUM CHLORIDE, SODIUM LACTATE, POTASSIUM CHLORIDE, CALCIUM CHLORIDE 600; 310; 30; 20 MG/100ML; MG/100ML; MG/100ML; MG/100ML
INJECTION, SOLUTION INTRAVENOUS CONTINUOUS
Status: CANCELLED | OUTPATIENT
Start: 2025-03-27

## 2025-03-27 RX ORDER — KETOROLAC TROMETHAMINE 30 MG/ML
INJECTION, SOLUTION INTRAMUSCULAR; INTRAVENOUS
Status: COMPLETED
Start: 2025-03-27 | End: 2025-03-27

## 2025-03-27 RX ORDER — ONDANSETRON 2 MG/ML
INJECTION INTRAMUSCULAR; INTRAVENOUS
Status: COMPLETED
Start: 2025-03-27 | End: 2025-03-27

## 2025-03-27 RX ORDER — ETOMIDATE 2 MG/ML
INJECTION INTRAVENOUS AS NEEDED
Status: DISCONTINUED | OUTPATIENT
Start: 2025-03-27 | End: 2025-03-27 | Stop reason: SURG

## 2025-03-27 RX ORDER — KETAMINE HYDROCHLORIDE 50 MG/ML
INJECTION, SOLUTION INTRAMUSCULAR; INTRAVENOUS AS NEEDED
Status: DISCONTINUED | OUTPATIENT
Start: 2025-03-27 | End: 2025-03-27 | Stop reason: SURG

## 2025-03-27 RX ORDER — HYDROMORPHONE HYDROCHLORIDE 1 MG/ML
0.6 INJECTION, SOLUTION INTRAMUSCULAR; INTRAVENOUS; SUBCUTANEOUS EVERY 5 MIN PRN
Status: CANCELLED | OUTPATIENT
Start: 2025-03-27 | End: 2025-03-27

## 2025-03-27 RX ORDER — NALOXONE HYDROCHLORIDE 0.4 MG/ML
0.08 INJECTION, SOLUTION INTRAMUSCULAR; INTRAVENOUS; SUBCUTANEOUS AS NEEDED
Status: CANCELLED | OUTPATIENT
Start: 2025-03-27 | End: 2025-03-27

## 2025-03-27 RX ORDER — CAFFEINE AND SODIUM BENZOATE 125 MG/ML
750 INJECTION, SOLUTION INTRAMUSCULAR; INTRAVENOUS ONCE
Status: COMPLETED | OUTPATIENT
Start: 2025-03-27 | End: 2025-03-27

## 2025-03-27 RX ORDER — KETOROLAC TROMETHAMINE 30 MG/ML
15 INJECTION, SOLUTION INTRAMUSCULAR; INTRAVENOUS ONCE
Status: COMPLETED | OUTPATIENT
Start: 2025-03-27 | End: 2025-03-27

## 2025-03-27 RX ADMIN — KETAMINE HYDROCHLORIDE 25 MG: 50 INJECTION, SOLUTION INTRAMUSCULAR; INTRAVENOUS at 06:45:00

## 2025-03-27 RX ADMIN — CAFFEINE AND SODIUM BENZOATE 750 MG: 125 INJECTION, SOLUTION INTRAMUSCULAR; INTRAVENOUS at 06:32:00

## 2025-03-27 RX ADMIN — ETOMIDATE 14 MG: 2 INJECTION INTRAVENOUS at 06:45:00

## 2025-03-27 RX ADMIN — HYDRALAZINE HYDROCHLORIDE 10 MG: 20 INJECTION INTRAMUSCULAR; INTRAVENOUS at 06:21:00

## 2025-03-27 RX ADMIN — LABETALOL HYDROCHLORIDE 5 MG: 5 INJECTION, SOLUTION INTRAVENOUS at 06:45:00

## 2025-03-27 RX ADMIN — SODIUM CHLORIDE, SODIUM LACTATE, POTASSIUM CHLORIDE, CALCIUM CHLORIDE: 600; 310; 30; 20 INJECTION, SOLUTION INTRAVENOUS at 06:20:00

## 2025-03-27 RX ADMIN — KETOROLAC TROMETHAMINE 15 MG: 30 INJECTION, SOLUTION INTRAMUSCULAR; INTRAVENOUS at 06:21:00

## 2025-03-27 RX ADMIN — ONDANSETRON 8 MG: 2 INJECTION INTRAMUSCULAR; INTRAVENOUS at 06:22:00

## 2025-03-27 RX ADMIN — SODIUM CHLORIDE, SODIUM LACTATE, POTASSIUM CHLORIDE, CALCIUM CHLORIDE: 600; 310; 30; 20 INJECTION, SOLUTION INTRAVENOUS at 06:53:00

## 2025-03-27 NOTE — ANESTHESIA PREPROCEDURE EVALUATION
PRE-OP EVALUATION    Patient Name: Robe Nunez    Admit Diagnosis: Severe recurrent major depression without psychotic features ([facility]) [F33.2]    Pre-op Diagnosis: * No surgery found *        Anesthesia Procedure: ECT(BEDSIDE)    * Surgery not found *    Pre-op vitals reviewed.  Temp: 98.2 °F (36.8 °C)  Pulse: 80  Resp: 17  BP: 138/78  SpO2: 96 %  Body mass index is 27.62 kg/m².    Current medications reviewed.  Hospital Medications:   lactated ringers infusion   Intravenous Continuous    [COMPLETED] ketorolac (Toradol) 30 MG/ML injection 15 mg  15 mg Intravenous Once    [COMPLETED] ondansetron (Zofran) 4 MG/2ML injection 8 mg  8 mg Intravenous Once    [COMPLETED] caffeine-sodium benzoate 125-125 mg/mL injection  750 mg Intravenous Once    [COMPLETED] hydrALAzine (Apresoline) 20 mg/mL injection 10 mg  10 mg Intravenous Once    [COMPLETED] acetaminophen (Tylenol Extra Strength) tab 1,000 mg  1,000 mg Oral Once    [COMPLETED] ondansetron (Zofran) 4 MG/2ML injection        [COMPLETED] ketorolac (Toradol) 30 MG/ML injection        [COMPLETED] caffeine-sodium benzoate 125-125 MG/ML injection        [COMPLETED] hydrALAzine (Apresoline) 20 mg/mL injection        [COMPLETED] ketamine (Ketalar) 50 MG/ML injection        DULoxetine (Cymbalta) DR cap 60 mg  60 mg Oral Daily    QUEtiapine (SEROquel) tab 50 mg  50 mg Oral Nightly    LORazepam (Ativan) tab 0.5 mg  0.5 mg Oral Q4H PRN    Or    LORazepam (Ativan) 2 mg/mL injection 0.5 mg  0.5 mg Intramuscular Q4H PRN    temazepam (Restoril) cap 15 mg  15 mg Oral Nightly PRN    haloperidol (Haldol) tab 0.5 mg  0.5 mg Oral Q4H PRN    Or    haloperidol lactate (Haldol) 5 MG/ML injection 0.5 mg  0.5 mg Intramuscular Q4H PRN    benztropine (Cogentin) tab 0.5 mg  0.5 mg Oral Q4H PRN    Or    benztropine mesylate (Cogentin) 1 mg/mL injection 0.5 mg  0.5 mg Intramuscular Q4H PRN    alum-mag hydroxide-simethicone (Maalox) 200-200-20 MG/5ML oral suspension 30 mL  30 mL Oral Q4H PRN     acetaminophen (Tylenol) tab 325 mg  325 mg Oral Q4H PRN    Or    acetaminophen (Tylenol) tab 650 mg  650 mg Oral Q4H PRN    docusate sodium (Colace) cap 100 mg  100 mg Oral BID PRN    polyethylene glycol (PEG 3350) (Miralax) 17 g oral packet 17 g  17 g Oral Daily PRN    magnesium hydroxide (Milk of Magnesia) 400 MG/5ML oral suspension 30 mL  30 mL Oral Daily PRN    bisacodyl (Dulcolax) 10 MG rectal suppository 10 mg  10 mg Rectal Daily PRN    fleet enema (Fleet) rectal enema 133 mL  1 enema Rectal Once PRN    metoprolol succinate ER (Toprol XL) 24 hr tab 25 mg  25 mg Oral Daily    acidophilus (Probiotic) cap/tab 1 capsule  1 capsule Oral QAM    atorvastatin (Lipitor) tab 10 mg  10 mg Oral Nightly    cholecalciferol (Vitamin D3) tab 2,000 Units  2,000 Units Oral Daily    lisinopril (Zestril) 10 mg, hydroCHLOROthiazide 12.5 mg for Zestoretic 10-12.5 (EEH only)   Oral Daily       Outpatient Medications:   Prescriptions Prior to Admission[1]    Allergies: Other and Seasonal      Anesthesia Evaluation    Patient summary reviewed.    Anesthetic Complications           GI/Hepatic/Renal    Negative GI/hepatic/renal ROS.                             Cardiovascular            MET: >4      (+) hypertension                                     Endo/Other    Negative endo/other ROS.                              Pulmonary    Negative pulmonary ROS.                       Neuro/Psych      (+) depression                                Past Surgical History:   Procedure Laterality Date    Colonoscopy & polypectomy  05/09/2016    diverticulosis and a small polyp was removed; ASC    Colonoscopy,biopsy N/A 05/09/2016    Procedure: COLONOSCOPY, POSSIBLE BIOPSY, POSSIBLE POLYPECTOMY 07398;  Surgeon: Shaun Mock MD;  [place] [facility]    Ect provided Bilateral 12/20/2024    1st. Tx. 12/20/24    Ect(bedside)      Other surgical history      scope knee    Other surgical history  11/29/2024    Aquablation of the prostate     Patient documented not to have experienced any of the following events N/A 05/09/2016    Procedure: COLONOSCOPY, POSSIBLE BIOPSY, POSSIBLE POLYPECTOMY 16446;  Surgeon: Shaun Mock MD;  Location: [facility]    Patient withough preoperative order for iv antibiotic surgical site infection prophylaxis. N/A 05/09/2016    Procedure: COLONOSCOPY, POSSIBLE BIOPSY, POSSIBLE POLYPECTOMY 89200;  Surgeon: Shaun Mock MD;  Location: [facility]    Special service or report  1990s    R knee arthroscopy lat meniscus     Social History     Socioeconomic History    Marital status:    Tobacco Use    Smoking status: Never    Smokeless tobacco: Never   Vaping Use    Vaping status: Unknown   Substance and Sexual Activity    Alcohol use: No     Alcohol/week: 0.0 - 1.0 standard drinks of alcohol     Comment: rare    Drug use: No    Sexual activity: Yes     History   Drug Use No     Available pre-op labs reviewed.  Lab Results   Component Value Date    WBC 6.6 03/23/2025    RBC 4.41 03/23/2025    HGB 12.8 (L) 03/23/2025    HCT 36.4 (L) 03/23/2025    MCV 82.5 03/23/2025    MCH 29.0 03/23/2025    MCHC 35.2 03/23/2025    RDW 12.2 03/23/2025    .0 03/23/2025     Lab Results   Component Value Date     03/23/2025    K 3.9 03/23/2025     03/23/2025    CO2 25.0 03/23/2025    BUN 12 03/23/2025    CREATSERUM 0.97 03/23/2025     (H) 03/23/2025    CA 10.0 03/23/2025     Lab Results   Component Value Date    INR 1.06 03/21/2025         Airway      Mallampati: II  Mouth opening: >3 FB  TM distance: > 6 cm  Neck ROM: full Cardiovascular    Cardiovascular exam normal.  Rhythm: regular  Rate: normal     Dental    Dentition appears grossly intact         Pulmonary    Pulmonary exam normal.  Breath sounds clear to auscultation bilaterally.               Other findings              ASA: 2   Plan: general  NPO status verified and patient meets guidelines.    Post-procedure pain management  plan discussed with surgeon and patient.      Plan/risks discussed with: patient  Use of blood product(s) discussed with: patient              Present on Admission:  **None**         [1] (Not in a hospital admission)

## 2025-03-27 NOTE — ANESTHESIA POSTPROCEDURE EVALUATION
[facility]    Robe Nunez Patient Status:  Outpatient   Age/Gender 74 year old male MRN [id number]   Location [facility] POST ANESTHESIA CARE UNIT Attending Frances Sung MD   Hosp Day # 0 PCP Mango Boland MD       Anesthesia Post-op Note        Procedure Summary       Date: 03/27/25 Room / Location: [facility] Post Anesthesia Care Unit    Anesthesia Start: 0642 Anesthesia Stop: 0653    Procedure: ECT(BEDSIDE) Diagnosis: Severe recurrent major depression without psychotic features (HCC)    Scheduled Providers:  Anesthesiologist: Wiliam Rocha MD    Anesthesia Type: general ASA Status: 2            Anesthesia Type: general    Vitals Value Taken Time   /85 03/27/25 0654   Temp 97.3 °F (36.3 °C) 03/27/25 0654   Pulse 80 03/27/25 0654   Resp 16 03/27/25 0654   SpO2 95 % 03/27/25 0654           Patient Location: PACU    Anesthesia Type: general    Airway Patency: patent    Postop Pain Control: adequate    Mental Status: mildly sedated but able to meaningfully participate in the post-anesthesia evaluation    Nausea/Vomiting: none    Cardiopulmonary/Hydration status: stable euvolemic    Complications: no apparent anesthesia related complications    Postop vital signs: stable    Dental Exam: Unchanged from Preop

## 2025-03-27 NOTE — PROGRESS NOTES
[facility] / [facility]  ECT History & Physical    Robe Nunez Patient Status:  Outpatient   Age/Gender 74 year old male MRN [id number]   Location [facility] POST ANESTHESIA CARE UNIT Attending Frances Sung MD   Hosp Day # 0 PCP Mango Boland MD     Date: 3/27/2025    Diagnosis: Major depression recurrent severe    Procedure:  ECT    HPI:     Patient seen.  Patient reports no cognitive or physical complaints      Medical History:  Past Medical History:    Cancer (HCC)    Skin cancer    Depression    Headache disorder    High blood pressure    High cholesterol    HTN (hypertension)    Hyperlipidemia    Nonspecific elevation of levels of transaminase or lactic acid dehydrogenase (LDH)    s/p liver biopsy normal    Visual impairment    glasses       Surgical History:  Past Surgical History:   Procedure Laterality Date    Colonoscopy & polypectomy  05/09/2016    diverticulosis and a small polyp was removed; ASC    Colonoscopy,biopsy N/A 05/09/2016    Procedure: COLONOSCOPY, POSSIBLE BIOPSY, POSSIBLE POLYPECTOMY 32881;  Surgeon: Shaun Mock MD;  Location: [facility]    Ect provided Bilateral 12/20/2024    1st. Tx. 12/20/24    Ect(bedside)      Other surgical history      scope knee    Other surgical history  11/29/2024    Aquablation of the prostate    Patient documented not to have experienced any of the following events N/A 05/09/2016    Procedure: COLONOSCOPY, POSSIBLE BIOPSY, POSSIBLE POLYPECTOMY 05711;  Surgeon: Shaun Mock MD;  Location: [facility]    Patient withough preoperative order for iv antibiotic surgical site infection prophylaxis. N/A 05/09/2016    Procedure: COLONOSCOPY, POSSIBLE BIOPSY, POSSIBLE POLYPECTOMY 40108;  Surgeon: Shaun Mock MD;  Location: [facility]    Special service or report  1990s    R knee arthroscopy lat meniscus       Family History:  Family History   Problem Relation Age of Onset    Hypertension Father      Diabetes Father     Lipids Brother     Cancer Brother         prostate ca    Other (Other[other]) Sister         ?sle       ROS:  Unremarkable    Physical Exam:   BP (!) 171/87   Pulse 89   Temp 98 °F (36.7 °C)   Resp 26   Ht 67\"   Wt 80 kg (176 lb 5.9 oz)   SpO2 100%   BMI 27.62 kg/m²     General Appearance:    Alert, cooperative, no distress, appears stated age   Head:    Normocephalic, without obvious abnormality, atraumatic   Eyes:    PERRL, conjunctiva/corneas clear, EOM's intact       Nose:   Nares normal, septum midline, mucosa normal, no drainage    or sinus tenderness   Throat:   Lips, mucosa, and tongue normal; teeth and gums normal   Neck:   Supple, symmetrical, trachea midline   Lungs:     Clear to auscultation bilaterally, respirations unlabored    Heart:    Regular rate and rhythm, S1 and S2 normal, no murmur, rub   or gallop   Abdomen:     Soft, non-tender, bowel sounds active all four quadrants,     no masses, no organomegaly   Extremities:   Extremities normal, atraumatic, no cyanosis or edema   Pulses:   2+ and symmetric all extremities   Skin:   Skin color, texture, turgor normal, no rashes or lesions   Neurologic:   CNII-XII intact, normal strength, sensation and reflexes     throughout     Impressions & Plans: Major depression recurrent severe.  Bitemporal ECT    I have discussed the risks and benefits and alternatives with the patient/family.  They understand and agree to proceed with plan of care.    Bryn Taylor MD

## 2025-03-27 NOTE — ANESTHESIA POSTPROCEDURE EVALUATION
[facility]    Robe Nunez Patient Status:  Outpatient   Age/Gender 74 year old male MRN [id number]   Location [facility] POST ANESTHESIA CARE UNIT Attending Frances Sung MD   Hosp Day # 0 PCP Mango Boland MD       Anesthesia Post-op Note        Procedure Summary       Date: 03/27/25 Room / Location: [facility] Post Anesthesia Care Unit    Anesthesia Start: 0642 Anesthesia Stop: 0653    Procedure: ECT(BEDSIDE) Diagnosis: Severe recurrent major depression without psychotic features (HCC)    Scheduled Providers:  Anesthesiologist: Wiliam Rocha MD    Anesthesia Type: general ASA Status: 2            Anesthesia Type: general    Vitals Value Taken Time   /85 03/27/25 0654   Temp 97.3 °F (36.3 °C) 03/27/25 0654   Pulse 80 03/27/25 0654   Resp 16 03/27/25 0654   SpO2 95 % 03/27/25 0654           Patient Location: PACU    Anesthesia Type: general    Airway Patency: patent    Postop Pain Control: adequate    Mental Status: mildly sedated but able to meaningfully participate in the post-anesthesia evaluation    Nausea/Vomiting: none    Cardiopulmonary/Hydration status: stable euvolemic    Complications: no apparent anesthesia related complications    Postop vital signs: stable    Dental Exam: Unchanged from Preop

## 2025-03-27 NOTE — PROGRESS NOTES
[facility] / [facility]  ECT Procedure Note    Robe Nunez Patient Status:  Outpatient   Age/Gender 74 year old male MRN [id number]   Location [facility] POST ANESTHESIA CARE UNIT Attending Frances Sung MD   Hosp Day # 0 PCP Mango Boland MD     3/27/2025    ECT Number: #13 total.  #2 in series    Diagnosis: Major Depression Recurrent Severe Without Psychotic Features. F33.2    Type of ECT:  Bitemporal    Place of Service:  Inpatient    Settings:   1.  Energy Percentage: 100%    2.  Program:  DGX    Pre-ECT Evaluation    Symptoms:  Patient seen.  Patient noted to have noted to have continued significant dysphoria and anxiety that he notes after first ECT that has been some positive improvements.  Patient noted no pato or psychosis.  Patient notes continued periods of obsessional thinking.  Patient notes that after first ECT that has been increasingly helpful.  Patient reports no cognitive or physical complaints currently.  Patient shows capacity to make decisions.  Patient with some periods of thoughts of death.  Discussed treatment options and plan on continuing ECT.    Risk/Benefits:  Discussed with patient side effects of ECT including headache, teeth, jaw, cardiac, pulmonary, NPO, aspiration, allergic reactions, anesthetic reactions, musculoskeletal, neurologic, morbidity/mortality, potential lack of efficacy, unilateral/bilateral ECT, relapse/maintenance issues, cognitive risks including memory, concentration, cognition, and other risks.    Side Effects:  Patient notes no cognitive/physical complaints    Exam:  Mood:  \"Depressed and anxious, patient with some continuation of significant depression  Affect: Congruent  Memory:  intact immediate, recent, remote and as evidenced by ability to present consistent history  Concentration:   fair  Suicidal ideation: Diminishing suicidal thoughts    Prior to procedure, reviewed with treatment team correct patient, time of procedure and type of  ECT.  Also reviewed with anesthesia pre-ECT medications.    Patient Monitored:  B/P, EKG, EEG, Pulse Ox, Left Ankle Cuff    Pre-ECT Medications: Toradol 15 mg IV, Hydralazine 10 mg IV 30 min prior to treatment, and Zofran 8 mg IV and caffeine 750 mg IV    ECT Medications:  Labetalol 5 mg IV, Anesthetic  Etomidate 14 mg IV followed by ketamine 25 mg IV, and Succinylcholine 80 mg IV    Seizure Duration:  Motor: 29 seconds       EE seconds    Post-ECT Condition:  Treatment unremarkable    Post-ECT Medications:  None     Bryn Taylor MD

## 2025-03-28 VITALS — BODY MASS INDEX: 28 KG/M2 | HEIGHT: 67 IN

## 2025-03-31 ENCOUNTER — ANESTHESIA EVENT (OUTPATIENT)
Dept: POSTOP/PACU | Facility: HOSPITAL | Age: 75
End: 2025-03-31
Payer: MEDICARE

## 2025-03-31 ENCOUNTER — HOSPITAL ENCOUNTER (OUTPATIENT)
Dept: POSTOP/PACU | Facility: HOSPITAL | Age: 75
Discharge: HOME | End: 2025-03-31
Attending: Other
Payer: MEDICARE

## 2025-03-31 ENCOUNTER — ANESTHESIA (OUTPATIENT)
Dept: POSTOP/PACU | Facility: HOSPITAL | Age: 75
End: 2025-03-31
Payer: MEDICARE

## 2025-03-31 VITALS
TEMPERATURE: 99 F | HEIGHT: 67 IN | HEART RATE: 93 BPM | BODY MASS INDEX: 28 KG/M2 | OXYGEN SATURATION: 95 % | RESPIRATION RATE: 17 BRPM | SYSTOLIC BLOOD PRESSURE: 159 MMHG | DIASTOLIC BLOOD PRESSURE: 90 MMHG

## 2025-03-31 DIAGNOSIS — F33.2 SEVERE RECURRENT MAJOR DEPRESSION WITHOUT PSYCHOTIC FEATURES (HCC): ICD-10-CM

## 2025-03-31 RX ORDER — SODIUM CHLORIDE, SODIUM LACTATE, POTASSIUM CHLORIDE, CALCIUM CHLORIDE 600; 310; 30; 20 MG/100ML; MG/100ML; MG/100ML; MG/100ML
INJECTION, SOLUTION INTRAVENOUS CONTINUOUS
Status: DISCONTINUED | OUTPATIENT
Start: 2025-03-31 | End: 2025-04-02

## 2025-03-31 RX ORDER — CAFFEINE AND SODIUM BENZOATE 125 MG/ML
750 INJECTION, SOLUTION INTRAMUSCULAR; INTRAVENOUS ONCE
Status: COMPLETED | OUTPATIENT
Start: 2025-03-31 | End: 2025-03-31

## 2025-03-31 RX ORDER — KETAMINE HYDROCHLORIDE 50 MG/ML
INJECTION, SOLUTION INTRAMUSCULAR; INTRAVENOUS
Status: COMPLETED
Start: 2025-03-31 | End: 2025-03-31

## 2025-03-31 RX ORDER — HYDRALAZINE HYDROCHLORIDE 20 MG/ML
INJECTION INTRAMUSCULAR; INTRAVENOUS
Status: COMPLETED
Start: 2025-03-31 | End: 2025-03-31

## 2025-03-31 RX ORDER — HYDROMORPHONE HYDROCHLORIDE 1 MG/ML
0.6 INJECTION, SOLUTION INTRAMUSCULAR; INTRAVENOUS; SUBCUTANEOUS EVERY 5 MIN PRN
Status: DISCONTINUED | OUTPATIENT
Start: 2025-03-31 | End: 2025-03-31 | Stop reason: HOSPADM

## 2025-03-31 RX ORDER — HYDROMORPHONE HYDROCHLORIDE 1 MG/ML
0.4 INJECTION, SOLUTION INTRAMUSCULAR; INTRAVENOUS; SUBCUTANEOUS EVERY 5 MIN PRN
Status: DISCONTINUED | OUTPATIENT
Start: 2025-03-31 | End: 2025-03-31 | Stop reason: HOSPADM

## 2025-03-31 RX ORDER — HYDROMORPHONE HYDROCHLORIDE 1 MG/ML
0.2 INJECTION, SOLUTION INTRAMUSCULAR; INTRAVENOUS; SUBCUTANEOUS EVERY 5 MIN PRN
Status: DISCONTINUED | OUTPATIENT
Start: 2025-03-31 | End: 2025-03-31 | Stop reason: HOSPADM

## 2025-03-31 RX ORDER — KETAMINE HYDROCHLORIDE 50 MG/ML
INJECTION, SOLUTION INTRAMUSCULAR; INTRAVENOUS AS NEEDED
Status: DISCONTINUED | OUTPATIENT
Start: 2025-03-31 | End: 2025-03-31 | Stop reason: SURG

## 2025-03-31 RX ORDER — KETOROLAC TROMETHAMINE 30 MG/ML
INJECTION, SOLUTION INTRAMUSCULAR; INTRAVENOUS
Status: COMPLETED
Start: 2025-03-31 | End: 2025-03-31

## 2025-03-31 RX ORDER — ETOMIDATE 2 MG/ML
INJECTION INTRAVENOUS AS NEEDED
Status: DISCONTINUED | OUTPATIENT
Start: 2025-03-31 | End: 2025-03-31 | Stop reason: SURG

## 2025-03-31 RX ORDER — NALOXONE HYDROCHLORIDE 0.4 MG/ML
0.08 INJECTION, SOLUTION INTRAMUSCULAR; INTRAVENOUS; SUBCUTANEOUS AS NEEDED
Status: DISCONTINUED | OUTPATIENT
Start: 2025-03-31 | End: 2025-03-31 | Stop reason: HOSPADM

## 2025-03-31 RX ORDER — KETOROLAC TROMETHAMINE 30 MG/ML
15 INJECTION, SOLUTION INTRAMUSCULAR; INTRAVENOUS ONCE
Status: COMPLETED | OUTPATIENT
Start: 2025-03-31 | End: 2025-03-31

## 2025-03-31 RX ORDER — HYDRALAZINE HYDROCHLORIDE 20 MG/ML
10 INJECTION INTRAMUSCULAR; INTRAVENOUS ONCE
Status: COMPLETED | OUTPATIENT
Start: 2025-03-31 | End: 2025-03-31

## 2025-03-31 RX ORDER — LABETALOL HYDROCHLORIDE 5 MG/ML
INJECTION, SOLUTION INTRAVENOUS AS NEEDED
Status: DISCONTINUED | OUTPATIENT
Start: 2025-03-31 | End: 2025-03-31 | Stop reason: SURG

## 2025-03-31 RX ORDER — ONDANSETRON 2 MG/ML
INJECTION INTRAMUSCULAR; INTRAVENOUS
Status: COMPLETED
Start: 2025-03-31 | End: 2025-03-31

## 2025-03-31 RX ORDER — ONDANSETRON 2 MG/ML
8 INJECTION INTRAMUSCULAR; INTRAVENOUS ONCE
Status: COMPLETED | OUTPATIENT
Start: 2025-03-31 | End: 2025-03-31

## 2025-03-31 RX ORDER — CAFFEINE AND SODIUM BENZOATE 125 MG/ML
INJECTION, SOLUTION INTRAMUSCULAR; INTRAVENOUS
Status: COMPLETED
Start: 2025-03-31 | End: 2025-03-31

## 2025-03-31 RX ORDER — SODIUM CHLORIDE, SODIUM LACTATE, POTASSIUM CHLORIDE, CALCIUM CHLORIDE 600; 310; 30; 20 MG/100ML; MG/100ML; MG/100ML; MG/100ML
INJECTION, SOLUTION INTRAVENOUS CONTINUOUS
Status: DISCONTINUED | OUTPATIENT
Start: 2025-03-31 | End: 2025-03-31 | Stop reason: HOSPADM

## 2025-03-31 RX ADMIN — LABETALOL HYDROCHLORIDE 5 MG: 5 INJECTION, SOLUTION INTRAVENOUS at 07:24:00

## 2025-03-31 RX ADMIN — KETAMINE HYDROCHLORIDE 25 MG: 50 INJECTION, SOLUTION INTRAMUSCULAR; INTRAVENOUS at 07:24:00

## 2025-03-31 RX ADMIN — ETOMIDATE 14 MG: 2 INJECTION INTRAVENOUS at 07:24:00

## 2025-03-31 RX ADMIN — KETOROLAC TROMETHAMINE 15 MG: 30 INJECTION, SOLUTION INTRAMUSCULAR; INTRAVENOUS at 06:50:00

## 2025-03-31 RX ADMIN — SODIUM CHLORIDE, SODIUM LACTATE, POTASSIUM CHLORIDE, CALCIUM CHLORIDE: 600; 310; 30; 20 INJECTION, SOLUTION INTRAVENOUS at 06:00:00

## 2025-03-31 RX ADMIN — CAFFEINE AND SODIUM BENZOATE 750 MG: 125 INJECTION, SOLUTION INTRAMUSCULAR; INTRAVENOUS at 06:00:00

## 2025-03-31 RX ADMIN — ONDANSETRON 8 MG: 2 INJECTION INTRAMUSCULAR; INTRAVENOUS at 06:46:00

## 2025-03-31 RX ADMIN — HYDRALAZINE HYDROCHLORIDE 10 MG: 20 INJECTION INTRAMUSCULAR; INTRAVENOUS at 06:00:00

## 2025-03-31 NOTE — ANESTHESIA PREPROCEDURE EVALUATION
PRE-OP EVALUATION    Patient Name: Robe Nunez    Admit Diagnosis: Severe recurrent major depression without psychotic features ([facility]) [F33.2]    Pre-op Diagnosis: * No surgery found *        Anesthesia Procedure: ECT(BEDSIDE)    * Surgery not found *    Pre-op vitals reviewed.        Body mass index is 27.62 kg/m².    Current medications reviewed.  Hospital Medications:   lactated ringers infusion   Intravenous Continuous    ketorolac (Toradol) 30 MG/ML injection 15 mg  15 mg Intravenous Once    [COMPLETED] ondansetron (Zofran) 4 MG/2ML injection 8 mg  8 mg Intravenous Once    caffeine-sodium benzoate 125-125 mg/mL injection  750 mg Intravenous Once    hydrALAzine (Apresoline) 20 mg/mL injection 10 mg  10 mg Intravenous Once    [COMPLETED] acetaminophen (Tylenol Extra Strength) tab 1,000 mg  1,000 mg Oral Once    [COMPLETED] ketamine (Ketalar) 50 MG/ML injection        [COMPLETED] ketorolac (Toradol) 30 MG/ML injection        [COMPLETED] caffeine-sodium benzoate 125-125 MG/ML injection        [COMPLETED] hydrALAzine (Apresoline) 20 mg/mL injection        QUEtiapine (SEROquel) tab 100 mg  100 mg Oral Nightly    DULoxetine (Cymbalta) DR cap 30 mg  30 mg Oral Daily    LORazepam (Ativan) tab 0.5 mg  0.5 mg Oral Q4H PRN    Or    LORazepam (Ativan) 2 mg/mL injection 0.5 mg  0.5 mg Intramuscular Q4H PRN    temazepam (Restoril) cap 15 mg  15 mg Oral Nightly PRN    haloperidol (Haldol) tab 0.5 mg  0.5 mg Oral Q4H PRN    Or    haloperidol lactate (Haldol) 5 MG/ML injection 0.5 mg  0.5 mg Intramuscular Q4H PRN    benztropine (Cogentin) tab 0.5 mg  0.5 mg Oral Q4H PRN    Or    benztropine mesylate (Cogentin) 1 mg/mL injection 0.5 mg  0.5 mg Intramuscular Q4H PRN    alum-mag hydroxide-simethicone (Maalox) 200-200-20 MG/5ML oral suspension 30 mL  30 mL Oral Q4H PRN    acetaminophen (Tylenol) tab 325 mg  325 mg Oral Q4H PRN    Or    acetaminophen (Tylenol) tab 650 mg  650 mg Oral Q4H PRN    docusate sodium (Colace) cap 100 mg   100 mg Oral BID PRN    polyethylene glycol (PEG 3350) (Miralax) 17 g oral packet 17 g  17 g Oral Daily PRN    magnesium hydroxide (Milk of Magnesia) 400 MG/5ML oral suspension 30 mL  30 mL Oral Daily PRN    bisacodyl (Dulcolax) 10 MG rectal suppository 10 mg  10 mg Rectal Daily PRN    fleet enema (Fleet) rectal enema 133 mL  1 enema Rectal Once PRN    metoprolol succinate ER (Toprol XL) 24 hr tab 25 mg  25 mg Oral Daily    acidophilus (Probiotic) cap/tab 1 capsule  1 capsule Oral QAM    atorvastatin (Lipitor) tab 10 mg  10 mg Oral Nightly    cholecalciferol (Vitamin D3) tab 2,000 Units  2,000 Units Oral Daily    lisinopril (Zestril) 10 mg, hydroCHLOROthiazide 12.5 mg for Zestoretic 10-12.5 (EEH only)   Oral Daily       Outpatient Medications:   Prescriptions Prior to Admission[1]    Allergies: Other and Seasonal      Anesthesia Evaluation    Patient summary reviewed.    Anesthetic Complications           GI/Hepatic/Renal    Negative GI/hepatic/renal ROS.                             Cardiovascular            MET: >4      (+) hypertension                                     Endo/Other    Negative endo/other ROS.                              Pulmonary    Negative pulmonary ROS.                       Neuro/Psych      (+) depression                                Past Surgical History:   Procedure Laterality Date    Colonoscopy & polypectomy  05/09/2016    diverticulosis and a small polyp was removed; ASC    Colonoscopy,biopsy N/A 05/09/2016    Procedure: COLONOSCOPY, POSSIBLE BIOPSY, POSSIBLE POLYPECTOMY 24243;  Surgeon: Shaun Mock MD;  Location: [facility]    Ect provided Bilateral 12/20/2024    1st. Tx. 12/20/24    Ect(bedside)      Other surgical history      scope knee    Other surgical history  11/29/2024    Aquablation of the prostate    Patient documented not to have experienced any of the following events N/A 05/09/2016    Procedure: COLONOSCOPY, POSSIBLE BIOPSY, POSSIBLE POLYPECTOMY 23409;   Surgeon: Shaun Mock MD;  Location: [facility]    Patient withough preoperative order for iv antibiotic surgical site infection prophylaxis. N/A 05/09/2016    Procedure: COLONOSCOPY, POSSIBLE BIOPSY, POSSIBLE POLYPECTOMY 66749;  Surgeon: Shaun Mock MD;  Location: [facility]    Special service or report  1990s    R knee arthroscopy lat meniscus     Social History     Socioeconomic History    Marital status:    Tobacco Use    Smoking status: Never    Smokeless tobacco: Never   Vaping Use    Vaping status: Unknown   Substance and Sexual Activity    Alcohol use: No     Alcohol/week: 0.0 - 1.0 standard drinks of alcohol     Comment: rare    Drug use: No    Sexual activity: Yes     History   Drug Use No     Available pre-op labs reviewed.  Lab Results   Component Value Date    WBC 6.6 03/23/2025    RBC 4.41 03/23/2025    HGB 12.8 (L) 03/23/2025    HCT 36.4 (L) 03/23/2025    MCV 82.5 03/23/2025    MCH 29.0 03/23/2025    MCHC 35.2 03/23/2025    RDW 12.2 03/23/2025    .0 03/23/2025     Lab Results   Component Value Date     03/23/2025    K 3.9 03/23/2025     03/23/2025    CO2 25.0 03/23/2025    BUN 12 03/23/2025    CREATSERUM 0.97 03/23/2025     (H) 03/23/2025    CA 10.0 03/23/2025     Lab Results   Component Value Date    INR 1.06 03/21/2025         Airway      Mallampati: II  Mouth opening: >3 FB  TM distance: > 6 cm  Neck ROM: full Cardiovascular    Cardiovascular exam normal.  Rhythm: regular  Rate: normal     Dental    Dentition appears grossly intact         Pulmonary    Pulmonary exam normal.  Breath sounds clear to auscultation bilaterally.               Other findings              ASA: 2   Plan: general  NPO status verified and patient meets guidelines.    Post-procedure pain management plan discussed with surgeon and patient.      Plan/risks discussed with: patient  Use of blood product(s) discussed with: patient              Present on  Admission:  **None**         [1] (Not in a hospital admission)

## 2025-03-31 NOTE — PROGRESS NOTES
[facility] / [facility]  ECT History & Physical    Robe Nunez Patient Status:  Outpatient   Age/Gender 74 year old male MRN [id number]   Location [facility] POST ANESTHESIA CARE UNIT Attending Bryn Taylor MD   Hosp Day # 0 PCP Mango Boland MD     Date of Service: 3/31/2025    Diagnosis:  Major Depression Recurrent Severe Without Psychotic Features. F33.2    Procedure:  Bitemporal    HPI: Improving in all areas.  No physical complaints      Medical History:  Past Medical History:    Cancer (HCC)    Skin cancer    Depression    Headache disorder    High blood pressure    High cholesterol    HTN (hypertension)    Hyperlipidemia    Nonspecific elevation of levels of transaminase or lactic acid dehydrogenase (LDH)    s/p liver biopsy normal    Visual impairment    glasses       Surgical History:  Past Surgical History:   Procedure Laterality Date    Colonoscopy & polypectomy  05/09/2016    diverticulosis and a small polyp was removed; ASC    Colonoscopy,biopsy N/A 05/09/2016    Procedure: COLONOSCOPY, POSSIBLE BIOPSY, POSSIBLE POLYPECTOMY 30829;  Surgeon: Shaun Mock MD;  Location: [facility]    Ect provided Bilateral 12/20/2024    1st. Tx. 12/20/24    Ect(bedside)      Other surgical history      scope knee    Other surgical history  11/29/2024    Aquablation of the prostate    Patient documented not to have experienced any of the following events N/A 05/09/2016    Procedure: COLONOSCOPY, POSSIBLE BIOPSY, POSSIBLE POLYPECTOMY 26017;  Surgeon: Shaun Mock MD;  Location: [facility]    Patient withough preoperative order for iv antibiotic surgical site infection prophylaxis. N/A 05/09/2016    Procedure: COLONOSCOPY, POSSIBLE BIOPSY, POSSIBLE POLYPECTOMY 70416;  Surgeon: Shaun Mock MD;  Location: [facility]    Special service or report  1990s    R knee arthroscopy lat meniscus       Family History:  Family History   Problem Relation Age of Onset     Hypertension Father     Diabetes Father     Lipids Brother     Cancer Brother         prostate ca    Other (Other[other]) Sister         ?sle       Social History:  Social History     Socioeconomic History    Marital status:      Spouse name: Not on file    Number of children: Not on file    Years of education: Not on file    Highest education level: Not on file   Occupational History    Not on file   Tobacco Use    Smoking status: Never    Smokeless tobacco: Never   Vaping Use    Vaping status: Unknown   Substance and Sexual Activity    Alcohol use: No     Alcohol/week: 0.0 - 1.0 standard drinks of alcohol     Comment: rare    Drug use: No    Sexual activity: Yes   Other Topics Concern    Not on file   Social History Narrative    marital status:     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[address]    caffeine: few cups    blood tranfusion: no    hiv exposure: no    travel: domestic    exercise: [employer] 2 x week    mammo:      dexa:      pap:      colonoscopy: 2006 ok    lab: 2012, 2013,2014    prostate: 2013,2014    testicles: 2013,2013    influenza: 2012,2013    bdt: yes    pneumovax: no    zoster: 2013                         Social Drivers of Health     Food Insecurity: No Food Insecurity (3/25/2025)    NCSS - Food Insecurity     Worried About Running Out of Food in the Last Year: No     Ran Out of Food in the Last Year: No   Transportation Needs: No Transportation Needs (3/25/2025)    NCSS - Transportation     Lack of Transportation: No   Housing Stability: Not At Risk (3/25/2025)    NCSS - Housing/Utilities     Has Housing: Yes     Worried About Losing Housing: No     Unable to Get Utilities: No       ROS:  unremarkable    Physical Exam:   Ht 67\"   BMI 27.62 kg/m²     General Appearance:    Alert, cooperative, no distress, appears stated age   Head:    Normocephalic, without obvious abnormality, atraumatic   Eyes:    PERRL, conjunctiva/corneas clear, EOM's intact   Nose:   Nares normal, septum  midline, mucosa normal, no drainage    or sinus tenderness   Throat:   Lips, mucosa, and tongue normal; teeth and gums normal   Neck:   Supple, symmetrical, trachea midline   Lungs:     Clear to auscultation bilaterally, respirations unlabored    Heart:    Regular rate and rhythm, S1 and S2 normal, no murmur, rub or gallop   Abdomen:     Soft, non-tender, bowel sounds active all four quadrants,     no masses, no organomegaly   Extremities:   Extremities normal, atraumatic, no cyanosis or edema   Pulses:   2+ and symmetric all extremities   Skin:   Skin color, texture, turgor normal, no rashes or lesions   Neurologic:   CNII-XII intact, normal strength, sensation and reflexes     throughout     Impressions & Plans:    Diagnosis:  Major Depression Recurrent Severe Without Psychotic Features. F33.2    Procedure:  Bitemporal    I have discussed the risks and benefits and alternatives with the patient/family.  They understand and agree to proceed with plan of care.    Frances Sung MD  3/31/2025

## 2025-03-31 NOTE — ANESTHESIA POSTPROCEDURE EVALUATION
[facility]    Robe Nunez Patient Status:  Outpatient   Age/Gender 74 year old male MRN [id number]   Location [facility] POST ANESTHESIA CARE UNIT Attending Bryn Taylor MD   Hosp Day # 0 PCP Mango Boland MD       Anesthesia Post-op Note        Procedure Summary       Date: 03/31/25 Room / Location: [facility] Post Anesthesia Care Unit    Anesthesia Start: 0720 Anesthesia Stop: 0735    Procedure: ECT(BEDSIDE) Diagnosis: Severe recurrent major depression without psychotic features (HCC)    Scheduled Providers:  Anesthesiologist: Cordell Hua MD    Anesthesia Type: general ASA Status: 2            Anesthesia Type: general    Vitals Value Taken Time   /74 03/31/25 0736   Temp 98 °F (36.7 °C) 03/31/25 0736   Pulse 81 03/31/25 0736   Resp 16 03/31/25 0736   SpO2 94 % 03/31/25 0736           Patient Location: PACU    Anesthesia Type: general    Airway Patency: patent    Postop Pain Control: adequate    Mental Status: mildly sedated but able to meaningfully participate in the post-anesthesia evaluation    Nausea/Vomiting: none    Cardiopulmonary/Hydration status: stable euvolemic    Complications: no apparent anesthesia related complications    Postop vital signs: stable    Dental Exam: Unchanged from Preop    Patient to be discharged from PACU when criteria met.

## 2025-03-31 NOTE — PROGRESS NOTES
[facility] / [facility]  ECT Procedure Note    Robe Nunez Patient Status:  Outpatient   Age/Gender 74 year old male   MRN [id number]    Location [facility] POST ANESTHESIA CARE UNIT Attending No att. providers found   Hosp Day # 0 PCP Mango Bolnad MD     ECT Number: #14 total-#3 in series    Diagnosis: Major Depression Recurrent Severe Without Psychotic Features. F33.2    Type of ECT:  Bitemporal    Place of Service:  Inpatient    Settings:   1.  Energy Percentage: 100%    2.  Program:  DGX    Pre-ECT Evaluation    Symptoms:      Prior to procedure, reviewed with treatment team correct patient, time of procedure and type of ECT.  Also reviewed with anesthesia pre-ECT medications    Patient reports that he continues to feel less depressed and anxious.  He remains isolative in his room.  He denies suicidal thoughts.  He notes that it is still difficult to sleep and is still somewhat preoccupied with medication.  He denies side effects with ECT other than mild cognitive fogginess..     The patient continues to retain capacity to consent for ECT.    Risk/Benefits:  Discussed with patient side effects of ECT including headache, teeth, jaw, cardiac, pulmonary, NPO, aspiration, allergic reactions, anesthetic reactions, musculoskeletal, neurologic, morbidity/mortality, potential lack of efficacy, unilateral/bilateral ECT, relapse/maintenance issues, cognitive risks including memory, concentration, cognition, and other risks.    Side Effects:  Mild memory complaint    Exam:  Mood: less depressed and less anxious  Affect: Congruent and brighter  Memory:  intact immediate, recent, remote and as evidenced by ability to present consistent history  Concentration:   good and as assessed by  ability to concentrate on our conversation  Suicidal ideation: no suicidal ideation    Patient Monitored:  B/P, EKG, EEG, Pulse Ox, Left Ankle Cuff    Pre-ECT Medications:   Toradol 15 mg IV, Hydralazine 10 mg IV 30  min prior to treatment, and Zofran 8 mg IV and caffeine 750 mg IV     ECT Medications:  Labetalol 5 mg IV, Anesthetic  Etomidate 14 mg IV followed by ketamine 25 mg IV, and Succinylcholine 80 mg IV    Seizure Duration:  Motor: 33 seconds       EE seconds    Post-ECT Condition:  Treatment unremarkable    ECT Medications:  None     Frances Sung    3/31/2025

## 2025-04-02 ENCOUNTER — HOSPITAL ENCOUNTER (OUTPATIENT)
Dept: POSTOP/PACU | Facility: HOSPITAL | Age: 75
Discharge: HOME | End: 2025-04-02
Attending: Other
Payer: MEDICARE

## 2025-04-02 ENCOUNTER — ANESTHESIA EVENT (OUTPATIENT)
Dept: POSTOP/PACU | Facility: HOSPITAL | Age: 75
End: 2025-04-02
Payer: MEDICARE

## 2025-04-02 ENCOUNTER — ANESTHESIA (OUTPATIENT)
Dept: POSTOP/PACU | Facility: HOSPITAL | Age: 75
End: 2025-04-02
Payer: MEDICARE

## 2025-04-02 VITALS
HEART RATE: 74 BPM | OXYGEN SATURATION: 96 % | SYSTOLIC BLOOD PRESSURE: 138 MMHG | TEMPERATURE: 99 F | RESPIRATION RATE: 13 BRPM | DIASTOLIC BLOOD PRESSURE: 72 MMHG

## 2025-04-02 DIAGNOSIS — F33.2 SEVERE RECURRENT MAJOR DEPRESSION WITHOUT PSYCHOTIC FEATURES (HCC): ICD-10-CM

## 2025-04-02 RX ORDER — HYDROMORPHONE HYDROCHLORIDE 1 MG/ML
0.4 INJECTION, SOLUTION INTRAMUSCULAR; INTRAVENOUS; SUBCUTANEOUS EVERY 5 MIN PRN
Status: ACTIVE | OUTPATIENT
Start: 2025-04-02 | End: 2025-04-02

## 2025-04-02 RX ORDER — CAFFEINE AND SODIUM BENZOATE 125 MG/ML
750 INJECTION, SOLUTION INTRAMUSCULAR; INTRAVENOUS ONCE
Status: COMPLETED | OUTPATIENT
Start: 2025-04-02 | End: 2025-04-02

## 2025-04-02 RX ORDER — ONDANSETRON 2 MG/ML
8 INJECTION INTRAMUSCULAR; INTRAVENOUS ONCE
Status: COMPLETED | OUTPATIENT
Start: 2025-04-02 | End: 2025-04-02

## 2025-04-02 RX ORDER — HYDROMORPHONE HYDROCHLORIDE 1 MG/ML
0.2 INJECTION, SOLUTION INTRAMUSCULAR; INTRAVENOUS; SUBCUTANEOUS EVERY 5 MIN PRN
Status: ACTIVE | OUTPATIENT
Start: 2025-04-02 | End: 2025-04-02

## 2025-04-02 RX ORDER — KETAMINE HYDROCHLORIDE 50 MG/ML
INJECTION, SOLUTION INTRAMUSCULAR; INTRAVENOUS
Status: COMPLETED
Start: 2025-04-02 | End: 2025-04-02

## 2025-04-02 RX ORDER — KETOROLAC TROMETHAMINE 30 MG/ML
INJECTION, SOLUTION INTRAMUSCULAR; INTRAVENOUS
Status: COMPLETED
Start: 2025-04-02 | End: 2025-04-02

## 2025-04-02 RX ORDER — CAFFEINE AND SODIUM BENZOATE 125 MG/ML
INJECTION, SOLUTION INTRAMUSCULAR; INTRAVENOUS
Status: COMPLETED
Start: 2025-04-02 | End: 2025-04-02

## 2025-04-02 RX ORDER — ETOMIDATE 2 MG/ML
INJECTION INTRAVENOUS AS NEEDED
Status: DISCONTINUED | OUTPATIENT
Start: 2025-04-02 | End: 2025-04-02 | Stop reason: SURG

## 2025-04-02 RX ORDER — SODIUM CHLORIDE, SODIUM LACTATE, POTASSIUM CHLORIDE, CALCIUM CHLORIDE 600; 310; 30; 20 MG/100ML; MG/100ML; MG/100ML; MG/100ML
INJECTION, SOLUTION INTRAVENOUS CONTINUOUS
Status: DISCONTINUED | OUTPATIENT
Start: 2025-04-02 | End: 2025-04-04

## 2025-04-02 RX ORDER — HYDRALAZINE HYDROCHLORIDE 20 MG/ML
10 INJECTION INTRAMUSCULAR; INTRAVENOUS ONCE
Status: COMPLETED | OUTPATIENT
Start: 2025-04-02 | End: 2025-04-02

## 2025-04-02 RX ORDER — KETAMINE HYDROCHLORIDE 50 MG/ML
INJECTION, SOLUTION INTRAMUSCULAR; INTRAVENOUS AS NEEDED
Status: DISCONTINUED | OUTPATIENT
Start: 2025-04-02 | End: 2025-04-02 | Stop reason: SURG

## 2025-04-02 RX ORDER — HYDROMORPHONE HYDROCHLORIDE 1 MG/ML
0.6 INJECTION, SOLUTION INTRAMUSCULAR; INTRAVENOUS; SUBCUTANEOUS EVERY 5 MIN PRN
Status: ACTIVE | OUTPATIENT
Start: 2025-04-02 | End: 2025-04-02

## 2025-04-02 RX ORDER — KETOROLAC TROMETHAMINE 30 MG/ML
15 INJECTION, SOLUTION INTRAMUSCULAR; INTRAVENOUS ONCE
Status: COMPLETED | OUTPATIENT
Start: 2025-04-02 | End: 2025-04-02

## 2025-04-02 RX ORDER — LABETALOL HYDROCHLORIDE 5 MG/ML
INJECTION, SOLUTION INTRAVENOUS AS NEEDED
Status: DISCONTINUED | OUTPATIENT
Start: 2025-04-02 | End: 2025-04-02 | Stop reason: SURG

## 2025-04-02 RX ORDER — HYDRALAZINE HYDROCHLORIDE 20 MG/ML
INJECTION INTRAMUSCULAR; INTRAVENOUS
Status: COMPLETED
Start: 2025-04-02 | End: 2025-04-02

## 2025-04-02 RX ORDER — ONDANSETRON 2 MG/ML
INJECTION INTRAMUSCULAR; INTRAVENOUS
Status: COMPLETED
Start: 2025-04-02 | End: 2025-04-02

## 2025-04-02 RX ORDER — NALOXONE HYDROCHLORIDE 0.4 MG/ML
0.08 INJECTION, SOLUTION INTRAMUSCULAR; INTRAVENOUS; SUBCUTANEOUS AS NEEDED
Status: ACTIVE | OUTPATIENT
Start: 2025-04-02 | End: 2025-04-02

## 2025-04-02 RX ADMIN — ONDANSETRON 8 MG: 2 INJECTION INTRAMUSCULAR; INTRAVENOUS at 06:40:00

## 2025-04-02 RX ADMIN — ETOMIDATE 14 MG: 2 INJECTION INTRAVENOUS at 07:07:00

## 2025-04-02 RX ADMIN — CAFFEINE AND SODIUM BENZOATE 750 MG: 125 INJECTION, SOLUTION INTRAMUSCULAR; INTRAVENOUS at 06:40:00

## 2025-04-02 RX ADMIN — LABETALOL HYDROCHLORIDE 5 MG: 5 INJECTION, SOLUTION INTRAVENOUS at 07:05:00

## 2025-04-02 RX ADMIN — KETOROLAC TROMETHAMINE 15 MG: 30 INJECTION, SOLUTION INTRAMUSCULAR; INTRAVENOUS at 06:40:00

## 2025-04-02 RX ADMIN — KETAMINE HYDROCHLORIDE 25 MG: 50 INJECTION, SOLUTION INTRAMUSCULAR; INTRAVENOUS at 07:07:00

## 2025-04-02 RX ADMIN — HYDRALAZINE HYDROCHLORIDE 10 MG: 20 INJECTION INTRAMUSCULAR; INTRAVENOUS at 06:41:00

## 2025-04-02 RX ADMIN — SODIUM CHLORIDE, SODIUM LACTATE, POTASSIUM CHLORIDE, CALCIUM CHLORIDE: 600; 310; 30; 20 INJECTION, SOLUTION INTRAVENOUS at 06:41:00

## 2025-04-02 NOTE — PROGRESS NOTES
[facility] / [facility]  ECT Procedure Note    Robe Nunez Patient Status:  Outpatient   Age/Gender 74 year old male MRN [id number]   Location [facility] POST ANESTHESIA CARE UNIT Attending Bryn Taylor MD   Hosp Day # 0 PCP Mango Boland MD     4/2/2025    ECT Number: #15 total.  #4 in series    Diagnosis: Major Depression Recurrent Severe Without Psychotic Features. F33.2    Type of ECT:  Bitemporal    Place of Service:  Inpatient    Settings:   1.  Energy Percentage: 100%    2.  Program:  DGX    Pre-ECT Evaluation    Symptoms:  Patient seen.  Overall patient reports positive improvements in mood affect and anxiety.  No suicidal thoughts.  No pato or psychosis.  He reports no cognitive or physical complaints.  Patient discussed plan on transition to outpatient treatment.  Discussed risks and benefits and also discussed monitoring NPO after midnight.  Patient noted to look significantly better.  Patient shows capacity make decisions.  Plan on transition to outpatient ECT.    Risk/Benefits:  Discussed with patient side effects of ECT including headache, teeth, jaw, cardiac, pulmonary, NPO, aspiration, allergic reactions, anesthetic reactions, musculoskeletal, neurologic, morbidity/mortality, potential lack of efficacy, unilateral/bilateral ECT, relapse/maintenance issues, cognitive risks including memory, concentration, cognition, and other risks.    Side Effects:  Patient notes no cognitive/physical complaints    Exam:  Mood: less depressed and less anxious  Affect: Congruent  Memory:  intact immediate, recent, remote and as evidenced by ability to present consistent history  Concentration:   good  Suicidal ideation: no suicidal ideation    Prior to procedure, reviewed with treatment team correct patient, time of procedure and type of ECT.  Also reviewed with anesthesia pre-ECT medications.    Patient Monitored:  B/P, EKG, EEG, Pulse Ox, Left Ankle Cuff    Pre-ECT Medications: Toradol 15 mg  IV, Hydralazine 10 mg IV 30 min prior to treatment, and Zofran 8 mg IV and caffeine 750 mg IV    ECT Medications:  Labetalol 5 mg IV, Anesthetic  Etomidate 14 mg IV followed by ketamine 25 mg IV, and Succinylcholine 80 mg IV    Seizure Duration:  Motor: 20 seconds       EE seconds    Post-ECT Condition:  Treatment unremarkable    Post-ECT Medications:  None     Bryn Taylor MD

## 2025-04-02 NOTE — ANESTHESIA POSTPROCEDURE EVALUATION
[facility]    Robe Nunez Patient Status:  Outpatient   Age/Gender 74 year old male MRN [id number]   Location [facility] POST ANESTHESIA CARE UNIT Attending Bryn Taylor MD   Hosp Day # 0 PCP Mango Boland MD       Anesthesia Post-op Note        Procedure Summary       Date: 04/02/25 Room / Location: [facility] Post Anesthesia Care Unit    Anesthesia Start: 0703 Anesthesia Stop: 0712    Procedure: ECT(BEDSIDE) Diagnosis: Severe recurrent major depression without psychotic features (HCC)    Scheduled Providers:  Anesthesiologist: Benjamin Arceo MD    Anesthesia Type: general ASA Status: 2            Anesthesia Type: general    Vitals Value Taken Time   /84 04/02/25 0712   Temp 98 04/02/25 0712   Pulse 84 04/02/25 0712   Resp 12 04/02/25 0712   SpO2 98 04/02/25 0712           Patient Location: PACU    Anesthesia Type: general    Airway Patency: patent    Postop Pain Control: adequate    Mental Status: preanesthetic baseline    Nausea/Vomiting: none    Cardiopulmonary/Hydration status: stable euvolemic    Complications: no apparent anesthesia related complications    Postop vital signs: stable    Dental Exam: Unchanged from Preop    Patient to be discharged from PACU when criteria met.

## 2025-04-02 NOTE — ANESTHESIA PREPROCEDURE EVALUATION
PRE-OP EVALUATION    Patient Name: Robe Nunez    Admit Diagnosis: Severe recurrent major depression without psychotic features ([facility]) [F33.2]    Pre-op Diagnosis: * No surgery found *        Anesthesia Procedure: ECT(BEDSIDE)    * Surgery not found *    Pre-op vitals reviewed.        There is no height or weight on file to calculate BMI.    Current medications reviewed.  Hospital Medications:   [COMPLETED] ondansetron (Zofran) 4 MG/2ML injection        [COMPLETED] ketorolac (Toradol) 30 MG/ML injection        [COMPLETED] caffeine-sodium benzoate 125-125 MG/ML injection        [COMPLETED] hydrALAzine (Apresoline) 20 mg/mL injection        [COMPLETED] ketamine (Ketalar) 50 MG/ML injection        [COMPLETED] acetaminophen (Tylenol Extra Strength) tab 1,000 mg  1,000 mg Oral Once    QUEtiapine (SEROquel) tab 100 mg  100 mg Oral Nightly    DULoxetine (Cymbalta) DR cap 30 mg  30 mg Oral Daily    LORazepam (Ativan) tab 0.5 mg  0.5 mg Oral Q4H PRN    Or    LORazepam (Ativan) 2 mg/mL injection 0.5 mg  0.5 mg Intramuscular Q4H PRN    temazepam (Restoril) cap 15 mg  15 mg Oral Nightly PRN    haloperidol (Haldol) tab 0.5 mg  0.5 mg Oral Q4H PRN    Or    haloperidol lactate (Haldol) 5 MG/ML injection 0.5 mg  0.5 mg Intramuscular Q4H PRN    benztropine (Cogentin) tab 0.5 mg  0.5 mg Oral Q4H PRN    Or    benztropine mesylate (Cogentin) 1 mg/mL injection 0.5 mg  0.5 mg Intramuscular Q4H PRN    alum-mag hydroxide-simethicone (Maalox) 200-200-20 MG/5ML oral suspension 30 mL  30 mL Oral Q4H PRN    acetaminophen (Tylenol) tab 325 mg  325 mg Oral Q4H PRN    Or    acetaminophen (Tylenol) tab 650 mg  650 mg Oral Q4H PRN    docusate sodium (Colace) cap 100 mg  100 mg Oral BID PRN    polyethylene glycol (PEG 3350) (Miralax) 17 g oral packet 17 g  17 g Oral Daily PRN    magnesium hydroxide (Milk of Magnesia) 400 MG/5ML oral suspension 30 mL  30 mL Oral Daily PRN    bisacodyl (Dulcolax) 10 MG rectal suppository 10 mg  10 mg Rectal Daily  PRN    fleet enema (Fleet) rectal enema 133 mL  1 enema Rectal Once PRN    metoprolol succinate ER (Toprol XL) 24 hr tab 25 mg  25 mg Oral Daily    acidophilus (Probiotic) cap/tab 1 capsule  1 capsule Oral QAM    atorvastatin (Lipitor) tab 10 mg  10 mg Oral Nightly    cholecalciferol (Vitamin D3) tab 2,000 Units  2,000 Units Oral Daily    lisinopril (Zestril) 10 mg, hydroCHLOROthiazide 12.5 mg for Zestoretic 10-12.5 ([facility] only)   Oral Daily       Outpatient Medications:   Prescriptions Prior to Admission[1]    Allergies: Other and Seasonal      Anesthesia Evaluation    Patient summary reviewed.    Anesthetic Complications           GI/Hepatic/Renal    Negative GI/hepatic/renal ROS.                             Cardiovascular            MET: >4      (+) hypertension                                     Endo/Other    Negative endo/other ROS.                              Pulmonary    Negative pulmonary ROS.                       Neuro/Psych      (+) depression                                Past Surgical History:   Procedure Laterality Date    Colonoscopy & polypectomy  05/09/2016    diverticulosis and a small polyp was removed; ASC    Colonoscopy,biopsy N/A 05/09/2016    Procedure: COLONOSCOPY, POSSIBLE BIOPSY, POSSIBLE POLYPECTOMY 34709;  Surgeon: Shaun Mock MD;  Location: [facility]    Ect provided Bilateral 12/20/2024    1st. Tx. 12/20/24    Ect(bedside)      Other surgical history      scope knee    Other surgical history  11/29/2024    Aquablation of the prostate    Patient documented not to have experienced any of the following events N/A 05/09/2016    Procedure: COLONOSCOPY, POSSIBLE BIOPSY, POSSIBLE POLYPECTOMY 61475;  Surgeon: Shaun Mock MD;  Location: [facility]    Patient withough preoperative order for iv antibiotic surgical site infection prophylaxis. N/A 05/09/2016    Procedure: COLONOSCOPY, POSSIBLE BIOPSY, POSSIBLE POLYPECTOMY 67344;  Surgeon: Shaun Mock MD;   Location: [facility]    Special service or report  1990s    R knee arthroscopy lat meniscus     Social History     Socioeconomic History    Marital status:    Tobacco Use    Smoking status: Never    Smokeless tobacco: Never   Vaping Use    Vaping status: Unknown   Substance and Sexual Activity    Alcohol use: No     Alcohol/week: 0.0 - 1.0 standard drinks of alcohol     Comment: rare    Drug use: No    Sexual activity: Yes     History   Drug Use No     Available pre-op labs reviewed.  Lab Results   Component Value Date    WBC 6.6 03/23/2025    RBC 4.41 03/23/2025    HGB 12.8 (L) 03/23/2025    HCT 36.4 (L) 03/23/2025    MCV 82.5 03/23/2025    MCH 29.0 03/23/2025    MCHC 35.2 03/23/2025    RDW 12.2 03/23/2025    .0 03/23/2025     Lab Results   Component Value Date     03/23/2025    K 3.9 03/23/2025     03/23/2025    CO2 25.0 03/23/2025    BUN 12 03/23/2025    CREATSERUM 0.97 03/23/2025     (H) 03/23/2025    CA 10.0 03/23/2025     Lab Results   Component Value Date    INR 1.06 03/21/2025         Airway      Mallampati: II  Mouth opening: >3 FB  TM distance: > 6 cm  Neck ROM: full Cardiovascular    Cardiovascular exam normal.  Rhythm: regular  Rate: normal     Dental    Dentition appears grossly intact         Pulmonary    Pulmonary exam normal.  Breath sounds clear to auscultation bilaterally.               Other findings              ASA: 2   Plan: general  NPO status verified and patient meets guidelines.    Post-procedure pain management plan discussed with surgeon and patient.      Plan/risks discussed with: patient  Use of blood product(s) discussed with: patient              Present on Admission:  **None**         [1] (Not in a hospital admission)

## 2025-04-02 NOTE — PROGRESS NOTES
[facility]  ECT History & Physical    Robe Nunez Patient Status:  Outpatient   Age/Gender 74 year old male MRN [id number]   Location [facility] POST ANESTHESIA CARE UNIT Attending Bryn Taylor MD   Hosp Day # 0 PCP Mango Boland MD     Date: 4/2/2025    Diagnosis: Major depression recurrent severe    Procedure:  ECT    HPI:     Patient seen.  Patient reports no cognitive or physical complaints      Medical History:  Past Medical History:    Cancer (HCC)    Skin cancer    Depression    Headache disorder    High blood pressure    High cholesterol    HTN (hypertension)    Hyperlipidemia    Nonspecific elevation of levels of transaminase or lactic acid dehydrogenase (LDH)    s/p liver biopsy normal    Visual impairment    glasses       Surgical History:  Past Surgical History:   Procedure Laterality Date    Colonoscopy & polypectomy  05/09/2016    diverticulosis and a small polyp was removed; ASC    Colonoscopy,biopsy N/A 05/09/2016    Procedure: COLONOSCOPY, POSSIBLE BIOPSY, POSSIBLE POLYPECTOMY 13316;  Surgeon: Shaun Mock MD;  Location: [facility]    Ect provided Bilateral 12/20/2024    1st. Tx. 12/20/24    Ect(bedside)      Other surgical history      scope knee    Other surgical history  11/29/2024    Aquablation of the prostate    Patient documented not to have experienced any of the following events N/A 05/09/2016    Procedure: COLONOSCOPY, POSSIBLE BIOPSY, POSSIBLE POLYPECTOMY 01045;  Surgeon: Shaun Mock MD;  Location: [facility]    Patient withough preoperative order for iv antibiotic surgical site infection prophylaxis. N/A 05/09/2016    Procedure: COLONOSCOPY, POSSIBLE BIOPSY, POSSIBLE POLYPECTOMY 94326;  Surgeon: Shaun Mock MD;  Location: [facility]    Special service or report  1990s    R knee arthroscopy lat meniscus       Family History:  Family History   Problem Relation Age of Onset    Hypertension Father      Diabetes Father     Lipids Brother     Cancer Brother         prostate ca    Other (Other[other]) Sister         ?sle       ROS:  Unremarkable    Physical Exam:   /72   Pulse 68   Temp 98.6 °F (37 °C) (Temporal)   Resp 14   SpO2 95%     General Appearance:    Alert, cooperative, no distress, appears stated age   Head:    Normocephalic, without obvious abnormality, atraumatic   Eyes:    PERRL, conjunctiva/corneas clear, EOM's intact       Nose:   Nares normal, septum midline, mucosa normal, no drainage    or sinus tenderness   Throat:   Lips, mucosa, and tongue normal; teeth and gums normal   Neck:   Supple, symmetrical, trachea midline   Lungs:     Clear to auscultation bilaterally, respirations unlabored    Heart:    Regular rate and rhythm, S1 and S2 normal, no murmur, rub   or gallop   Abdomen:     Soft, non-tender, bowel sounds active all four quadrants,     no masses, no organomegaly   Extremities:   Extremities normal, atraumatic, no cyanosis or edema   Pulses:   2+ and symmetric all extremities   Skin:   Skin color, texture, turgor normal, no rashes or lesions   Neurologic:   CNII-XII intact, normal strength, sensation and reflexes     throughout     Impressions & Plans: Major depression recurrent severe.  Bitemporal ECT    I have discussed the risks and benefits and alternatives with the patient/family.  They understand and agree to proceed with plan of care.    Bryn Taylor MD

## 2025-04-04 ENCOUNTER — ANESTHESIA EVENT (OUTPATIENT)
Dept: POSTOP/PACU | Facility: HOSPITAL | Age: 75
End: 2025-04-04
Payer: MEDICARE

## 2025-04-04 ENCOUNTER — HOSPITAL ENCOUNTER (OUTPATIENT)
Dept: POSTOP/PACU | Facility: HOSPITAL | Age: 75
Discharge: HOME | End: 2025-04-04
Attending: Other
Payer: MEDICARE

## 2025-04-04 ENCOUNTER — ANESTHESIA (OUTPATIENT)
Dept: POSTOP/PACU | Facility: HOSPITAL | Age: 75
End: 2025-04-04
Payer: MEDICARE

## 2025-04-04 VITALS
SYSTOLIC BLOOD PRESSURE: 133 MMHG | RESPIRATION RATE: 10 BRPM | DIASTOLIC BLOOD PRESSURE: 80 MMHG | BODY MASS INDEX: 28 KG/M2 | OXYGEN SATURATION: 97 % | HEART RATE: 76 BPM | HEIGHT: 67 IN | TEMPERATURE: 99 F

## 2025-04-04 DIAGNOSIS — F33.2 SEVERE RECURRENT MAJOR DEPRESSION WITHOUT PSYCHOTIC FEATURES (HCC): ICD-10-CM

## 2025-04-04 RX ORDER — HYDROMORPHONE HYDROCHLORIDE 1 MG/ML
0.4 INJECTION, SOLUTION INTRAMUSCULAR; INTRAVENOUS; SUBCUTANEOUS EVERY 5 MIN PRN
Status: ACTIVE | OUTPATIENT
Start: 2025-04-04 | End: 2025-04-04

## 2025-04-04 RX ORDER — HYDRALAZINE HYDROCHLORIDE 20 MG/ML
INJECTION INTRAMUSCULAR; INTRAVENOUS
Status: COMPLETED
Start: 2025-04-04 | End: 2025-04-04

## 2025-04-04 RX ORDER — ETOMIDATE 2 MG/ML
INJECTION INTRAVENOUS AS NEEDED
Status: DISCONTINUED | OUTPATIENT
Start: 2025-04-04 | End: 2025-04-04 | Stop reason: SURG

## 2025-04-04 RX ORDER — NALOXONE HYDROCHLORIDE 0.4 MG/ML
0.08 INJECTION, SOLUTION INTRAMUSCULAR; INTRAVENOUS; SUBCUTANEOUS AS NEEDED
Status: ACTIVE | OUTPATIENT
Start: 2025-04-04 | End: 2025-04-04

## 2025-04-04 RX ORDER — KETOROLAC TROMETHAMINE 30 MG/ML
INJECTION, SOLUTION INTRAMUSCULAR; INTRAVENOUS
Status: COMPLETED
Start: 2025-04-04 | End: 2025-04-04

## 2025-04-04 RX ORDER — ONDANSETRON 2 MG/ML
8 INJECTION INTRAMUSCULAR; INTRAVENOUS ONCE
Status: COMPLETED | OUTPATIENT
Start: 2025-04-04 | End: 2025-04-04

## 2025-04-04 RX ORDER — ONDANSETRON 2 MG/ML
INJECTION INTRAMUSCULAR; INTRAVENOUS
Status: COMPLETED
Start: 2025-04-04 | End: 2025-04-04

## 2025-04-04 RX ORDER — HYDROMORPHONE HYDROCHLORIDE 1 MG/ML
0.6 INJECTION, SOLUTION INTRAMUSCULAR; INTRAVENOUS; SUBCUTANEOUS EVERY 5 MIN PRN
Status: ACTIVE | OUTPATIENT
Start: 2025-04-04 | End: 2025-04-04

## 2025-04-04 RX ORDER — KETOROLAC TROMETHAMINE 30 MG/ML
15 INJECTION, SOLUTION INTRAMUSCULAR; INTRAVENOUS ONCE
Status: COMPLETED | OUTPATIENT
Start: 2025-04-04 | End: 2025-04-04

## 2025-04-04 RX ORDER — LIDOCAINE HYDROCHLORIDE 10 MG/ML
INJECTION, SOLUTION EPIDURAL; INFILTRATION; INTRACAUDAL; PERINEURAL AS NEEDED
Status: DISCONTINUED | OUTPATIENT
Start: 2025-04-04 | End: 2025-04-04 | Stop reason: SURG

## 2025-04-04 RX ORDER — SODIUM CHLORIDE, SODIUM LACTATE, POTASSIUM CHLORIDE, CALCIUM CHLORIDE 600; 310; 30; 20 MG/100ML; MG/100ML; MG/100ML; MG/100ML
INJECTION, SOLUTION INTRAVENOUS CONTINUOUS
Status: DISCONTINUED | OUTPATIENT
Start: 2025-04-04 | End: 2025-04-06

## 2025-04-04 RX ORDER — CAFFEINE AND SODIUM BENZOATE 125 MG/ML
INJECTION, SOLUTION INTRAMUSCULAR; INTRAVENOUS
Status: COMPLETED
Start: 2025-04-04 | End: 2025-04-04

## 2025-04-04 RX ORDER — CAFFEINE AND SODIUM BENZOATE 125 MG/ML
750 INJECTION, SOLUTION INTRAMUSCULAR; INTRAVENOUS ONCE
Status: COMPLETED | OUTPATIENT
Start: 2025-04-04 | End: 2025-04-04

## 2025-04-04 RX ORDER — KETAMINE HYDROCHLORIDE 50 MG/ML
INJECTION, SOLUTION INTRAMUSCULAR; INTRAVENOUS AS NEEDED
Status: DISCONTINUED | OUTPATIENT
Start: 2025-04-04 | End: 2025-04-04 | Stop reason: SURG

## 2025-04-04 RX ORDER — HYDRALAZINE HYDROCHLORIDE 20 MG/ML
10 INJECTION INTRAMUSCULAR; INTRAVENOUS ONCE
Status: COMPLETED | OUTPATIENT
Start: 2025-04-04 | End: 2025-04-04

## 2025-04-04 RX ORDER — HYDROMORPHONE HYDROCHLORIDE 1 MG/ML
0.2 INJECTION, SOLUTION INTRAMUSCULAR; INTRAVENOUS; SUBCUTANEOUS EVERY 5 MIN PRN
Status: ACTIVE | OUTPATIENT
Start: 2025-04-04 | End: 2025-04-04

## 2025-04-04 RX ADMIN — LIDOCAINE HYDROCHLORIDE 50 MG: 10 INJECTION, SOLUTION EPIDURAL; INFILTRATION; INTRACAUDAL; PERINEURAL at 07:04:00

## 2025-04-04 RX ADMIN — HYDRALAZINE HYDROCHLORIDE 10 MG: 20 INJECTION INTRAMUSCULAR; INTRAVENOUS at 05:57:00

## 2025-04-04 RX ADMIN — ETOMIDATE 14 MG: 2 INJECTION INTRAVENOUS at 07:04:00

## 2025-04-04 RX ADMIN — CAFFEINE AND SODIUM BENZOATE 750 MG: 125 INJECTION, SOLUTION INTRAMUSCULAR; INTRAVENOUS at 06:26:00

## 2025-04-04 RX ADMIN — ONDANSETRON 8 MG: 2 INJECTION INTRAMUSCULAR; INTRAVENOUS at 05:59:00

## 2025-04-04 RX ADMIN — KETAMINE HYDROCHLORIDE 25 MG: 50 INJECTION, SOLUTION INTRAMUSCULAR; INTRAVENOUS at 07:04:00

## 2025-04-04 RX ADMIN — SODIUM CHLORIDE, SODIUM LACTATE, POTASSIUM CHLORIDE, CALCIUM CHLORIDE: 600; 310; 30; 20 INJECTION, SOLUTION INTRAVENOUS at 05:52:00

## 2025-04-04 RX ADMIN — KETOROLAC TROMETHAMINE 15 MG: 30 INJECTION, SOLUTION INTRAMUSCULAR; INTRAVENOUS at 05:55:00

## 2025-04-04 NOTE — PROGRESS NOTES
[facility] / [facility]  ECT Procedure Note    Robe Nunez Patient Status:  Outpatient   Age/Gender 74 year old male MRN [id number]   Location [facility] POST ANESTHESIA CARE UNIT Attending Bryn Taylor MD   Hosp Day # 0 PCP Mango Boland MD     4/4/2025    ECT Number: #16 total.  #5 in series    Diagnosis: Major Depression Recurrent Severe Without Psychotic Features. F33.2    Type of ECT:  Bitemporal    Place of Service:  Outpatient    Settings:   1.  Energy Percentage: 100%    2.  Program:  DGX    Pre-ECT Evaluation    Symptoms:  Patient seen.  Overall patient reports significant improvements in mood.  Patient reports no suicidal thoughts.  Patient reports a significant decrease in neurovegetative symptoms and anxiety.  Patient reports positive transition to outpatient treatment.  No pato or psychosis.  No suicidal thoughts.  Patient reports no cognitive or physical complaints.  Discussed pros and cons of longer term maintenance treatment.  Patient shows capacity to make decisions.  Plan on scheduling ECT twice next week but if doing well, potential cancellation of the second 1 that going to weekly ECT scheduled for now.    Risk/Benefits:  Discussed with patient side effects of ECT including headache, teeth, jaw, cardiac, pulmonary, NPO, aspiration, allergic reactions, anesthetic reactions, musculoskeletal, neurologic, morbidity/mortality, potential lack of efficacy, unilateral/bilateral ECT, relapse/maintenance issues, cognitive risks including memory, concentration, cognition, and other risks.    Side Effects:  Patient notes no cognitive/physical complaints    Exam:  Mood: less depressed and less anxious  Affect: Congruent  Memory:  intact immediate, recent, remote and as evidenced by ability to present consistent history  Concentration:   good  Suicidal ideation: no suicidal ideation    Prior to procedure, reviewed with treatment team correct patient, time of procedure and type of ECT.   Also reviewed with anesthesia pre-ECT medications.    Patient Monitored:  B/P, EKG, EEG, Pulse Ox, Left Ankle Cuff    Pre-ECT Medications: Toradol 15 mg IV, Hydralazine 10 mg IV 30 min prior to treatment, and Zofran 8 mg IV and caffeine 750 mg IV    ECT Medications:  Labetalol 5 mg IV, Anesthetic  Etomidate 14 mg IV followed by ketamine 25 mg IV, and Succinylcholine 80 mg IV    Seizure Duration:  Motor: 23 seconds       EE seconds    Post-ECT Condition:  Treatment unremarkable    Post-ECT Medications:  None     Bryn Taylor MD

## 2025-04-04 NOTE — DISCHARGE INSTRUCTIONS
Discharge Instructions  Electroconvulsive Therapy    Activities:  You MUST arrange to have a responsible adult drive you home and have a responsible adult stay with you the rest of the day and overnight.  Do not drive today.  Do not operate any machinery today. Use kitchen equipment with caution.  Rest and take it easy today.  Do not take public transportation without the presence of another responsible adult for 24 hours    Medications:  Resume your regular medications when you get home  The nurse will instruct you not to take any NSAIDS (Advil, Aleve, Motrin, Ibuprofen) before 1 pm today because you were given a certain medication during the procedure.    Diet:  You may resume your regular diet when you get home  Do not drink alcohol for 24 hours    Additional Instructions:  Someone should call you to schedule any upcoming ECT treatments. Call as needed to schedule or cancel your ECT appointments [phone].  If you have any questions or concerns, please call your own psychiatrist.  For your safety, please do not wear make-up to any future ECT appointments.  For any questions regarding your ECT appointment, please call [facility] [phone].  For cancellations after hours, call [phone] and leave a message.    Expected Recovery:  As you awaken, you may experience one or more of the following:  Headache, nausea, temporary confusion, or muscle stiffness.  If these symptoms increase, become severe or are accompanied by a fever of more than 101, please seek medical attention.  The ECT may affect memory.  Many patients report loss of memory for events that occurred in the days, weeks or months surrounding the ECT.  Many of these memories may return, but not always.  Short-term memory may also be affected for months, but this can also be a result of the disorder that you have.

## 2025-04-04 NOTE — ADDENDUM NOTE
Addendum  created 04/04/25 0852 by Joseph Medrano, DO    Intraprocedure Event edited, Intraprocedure Staff edited

## 2025-04-04 NOTE — ANESTHESIA POSTPROCEDURE EVALUATION
[facility]    Robe Nunez Patient Status:  Outpatient   Age/Gender 74 year old male MRN [id number]   Location [facility] POST ANESTHESIA CARE UNIT Attending Bryn Taylor MD   Hosp Day # 0 PCP Mango Boland MD       Anesthesia Post-op Note        Procedure Summary       Date: 04/04/25 Room / Location: [facility] Post Anesthesia Care Unit    Anesthesia Start: 0654 Anesthesia Stop: 0704    Procedure: ECT(BEDSIDE) Diagnosis: Severe recurrent major depression without psychotic features (HCC)    Scheduled Providers:  Anesthesiologist: Joseph Medrano DO    Anesthesia Type: general ASA Status: 2            Anesthesia Type: general    Vitals Value Taken Time   /80 04/04/25 0705   Temp 97 04/04/25 0705   Pulse 81 04/04/25 0705   Resp 16 04/04/25 0705   SpO2 100 04/04/25 0705           Patient Location: PACU    Anesthesia Type: general    Airway Patency: patent    Postop Pain Control: adequate    Mental Status: mildly sedated but able to meaningfully participate in the post-anesthesia evaluation    Nausea/Vomiting: none    Cardiopulmonary/Hydration status: stable euvolemic    Complications: no apparent anesthesia related complications    Postop vital signs: stable    Dental Exam: Unchanged from Preop    Patient to be discharged from PACU when criteria met.

## 2025-04-04 NOTE — PROGRESS NOTES
[facility]  ECT History & Physical    Robe Nunez Patient Status:  Outpatient   Age/Gender 74 year old male MRN [id number]   Location [facility] POST ANESTHESIA CARE UNIT Attending Bryn Taylor MD   Hosp Day # 0 PCP Mango Boland MD     Date: 4/4/2025    Diagnosis: Major depression recurrent severe    Procedure:  ECT    HPI:     Patient seen.  Patient reports no cognitive or physical complaints      Medical History:  Past Medical History:    Cancer (HCC)    Skin cancer    Depression    Headache disorder    High blood pressure    High cholesterol    HTN (hypertension)    Hyperlipidemia    Nonspecific elevation of levels of transaminase or lactic acid dehydrogenase (LDH)    s/p liver biopsy normal    Visual impairment    glasses       Surgical History:  Past Surgical History:   Procedure Laterality Date    Colonoscopy & polypectomy  05/09/2016    diverticulosis and a small polyp was removed; ASC    Colonoscopy,biopsy N/A 05/09/2016    Procedure: COLONOSCOPY, POSSIBLE BIOPSY, POSSIBLE POLYPECTOMY 56378;  Surgeon: Shaun Mock MD;  Location: [facility]    Ect provided Bilateral 12/20/2024    1st. Tx. 12/20/24    Ect(bedside)      Other surgical history      scope knee    Other surgical history  11/29/2024    Aquablation of the prostate    Patient documented not to have experienced any of the following events N/A 05/09/2016    Procedure: COLONOSCOPY, POSSIBLE BIOPSY, POSSIBLE POLYPECTOMY 39701;  Surgeon: Shaun Mock MD;  Location: [facility]    Patient withough preoperative order for iv antibiotic surgical site infection prophylaxis. N/A 05/09/2016    Procedure: COLONOSCOPY, POSSIBLE BIOPSY, POSSIBLE POLYPECTOMY 62279;  Surgeon: Shaun Mock MD;  Location: [facility]    Special service or report  1990s    R knee arthroscopy lat meniscus       Family History:  Family History   Problem Relation Age of Onset    Hypertension Father      Diabetes Father     Lipids Brother     Cancer Brother         prostate ca    Other (Other[other]) Sister         ?sle       ROS:  Unremarkable    Physical Exam:   /80   Pulse 78   Temp 99.2 °F (37.3 °C) (Temporal)   Resp 13   Ht 67\"   SpO2 95%   BMI 27.62 kg/m²     General Appearance:    Alert, cooperative, no distress, appears stated age   Head:    Normocephalic, without obvious abnormality, atraumatic   Eyes:    PERRL, conjunctiva/corneas clear, EOM's intact       Nose:   Nares normal, septum midline, mucosa normal, no drainage    or sinus tenderness   Throat:   Lips, mucosa, and tongue normal; teeth and gums normal   Neck:   Supple, symmetrical, trachea midline   Lungs:     Clear to auscultation bilaterally, respirations unlabored    Heart:    Regular rate and rhythm, S1 and S2 normal, no murmur, rub   or gallop   Abdomen:     Soft, non-tender, bowel sounds active all four quadrants,     no masses, no organomegaly   Extremities:   Extremities normal, atraumatic, no cyanosis or edema   Pulses:   2+ and symmetric all extremities   Skin:   Skin color, texture, turgor normal, no rashes or lesions   Neurologic:   CNII-XII intact, normal strength, sensation and reflexes     throughout     Impressions & Plans: Major depression recurrent severe.  Bitemporal ECT    I have discussed the risks and benefits and alternatives with the patient/family.  They understand and agree to proceed with plan of care.    Bryn Taylor MD

## 2025-04-04 NOTE — ANESTHESIA PREPROCEDURE EVALUATION
PRE-OP EVALUATION    Patient Name: Robe Nunez    Admit Diagnosis: Severe recurrent major depression without psychotic features (HCC) [F33.2]    Pre-op Diagnosis: * No pre-op diagnosis entered *        Anesthesia Procedure: ECT(BEDSIDE)    * No surgeons found in log *    Pre-op vitals reviewed.  Temp: 98.2 °F (36.8 °C)  Pulse: 77  Resp: 16  BP: 125/78  SpO2: 96 %  Body mass index is 27.62 kg/m².    Current medications reviewed.  Hospital Medications:   lactated ringers infusion   Intravenous Continuous    [COMPLETED] ketorolac (Toradol) 30 MG/ML injection 15 mg  15 mg Intravenous Once    [COMPLETED] ondansetron (Zofran) 4 MG/2ML injection 8 mg  8 mg Intravenous Once    [COMPLETED] caffeine-sodium benzoate 125-125 mg/mL injection  750 mg Intravenous Once    [COMPLETED] hydrALAzine (Apresoline) 20 mg/mL injection 10 mg  10 mg Intravenous Once    [COMPLETED] caffeine-sodium benzoate 125-125 MG/ML injection           Outpatient Medications:   Prescriptions Prior to Admission[1]    Allergies: Other and Seasonal      Anesthesia Evaluation    Patient summary reviewed.    Anesthetic Complications           GI/Hepatic/Renal    Negative GI/hepatic/renal ROS.                             Cardiovascular    Negative cardiovascular ROS.        MET: >4                                           Endo/Other    Negative endo/other ROS.                              Pulmonary    Negative pulmonary ROS.                       Neuro/Psych    Negative neuro/psych ROS.  (+) depression                                Past Surgical History:   Procedure Laterality Date    Colonoscopy & polypectomy  05/09/2016    diverticulosis and a small polyp was removed; ASC    Colonoscopy,biopsy N/A 05/09/2016    Procedure: COLONOSCOPY, POSSIBLE BIOPSY, POSSIBLE POLYPECTOMY 25411;  Surgeon: Shaun Mock MD;  Location: [facility]    Ect provided Bilateral 12/20/2024    1st. Tx. 12/20/24    Ect(bedside)      Other surgical history       scope knee    Other surgical history  11/29/2024    Aquablation of the prostate    Patient documented not to have experienced any of the following events N/A 05/09/2016    Procedure: COLONOSCOPY, POSSIBLE BIOPSY, POSSIBLE POLYPECTOMY 08362;  Surgeon: Shaun Mock MD;  Location: [facility]    Patient withough preoperative order for iv antibiotic surgical site infection prophylaxis. N/A 05/09/2016    Procedure: COLONOSCOPY, POSSIBLE BIOPSY, POSSIBLE POLYPECTOMY 74703;  Surgeon: Shaun Mock MD;  Location: [facility]    Special service or report  1990s    R knee arthroscopy lat meniscus     Social History     Socioeconomic History    Marital status:    Tobacco Use    Smoking status: Never    Smokeless tobacco: Never   Vaping Use    Vaping status: Unknown   Substance and Sexual Activity    Alcohol use: No     Alcohol/week: 0.0 - 1.0 standard drinks of alcohol     Comment: rare    Drug use: No    Sexual activity: Yes     History   Drug Use No     Available pre-op labs reviewed.  Lab Results   Component Value Date    WBC 6.6 03/23/2025    RBC 4.41 03/23/2025    HGB 12.8 (L) 03/23/2025    HCT 36.4 (L) 03/23/2025    MCV 82.5 03/23/2025    MCH 29.0 03/23/2025    MCHC 35.2 03/23/2025    RDW 12.2 03/23/2025    .0 03/23/2025     Lab Results   Component Value Date     03/23/2025    K 3.9 03/23/2025     03/23/2025    CO2 25.0 03/23/2025    BUN 12 03/23/2025    CREATSERUM 0.97 03/23/2025     (H) 03/23/2025    CA 10.0 03/23/2025     Lab Results   Component Value Date    INR 1.06 03/21/2025         Airway             Cardiovascular    Cardiovascular exam normal.         Dental             Pulmonary    Pulmonary exam normal.                 Other findings              ASA: 2   Plan: general  NPO status verified and     Post-procedure pain management plan discussed with surgeon and patient.    Comment:    I explained intrinsic risks of general anesthesia, including  nausea, dental damage, sore throat, mouth injury,and hoarseness from airway management.  All questions were answered and understanding was demonstrated of risks.  Informed permission was obtained to proceed as documented in the signed consent form.      Plan/risks discussed with: patient      Consented to blood products.          Present on Admission:  **None**             [1] (Not in a hospital admission)

## 2025-04-08 ENCOUNTER — ANESTHESIA (OUTPATIENT)
Dept: POSTOP/PACU | Facility: HOSPITAL | Age: 75
End: 2025-04-08
Payer: MEDICARE

## 2025-04-08 ENCOUNTER — HOSPITAL ENCOUNTER (OUTPATIENT)
Dept: POSTOP/PACU | Facility: HOSPITAL | Age: 75
Discharge: HOME | End: 2025-04-08
Attending: Other
Payer: MEDICARE

## 2025-04-08 ENCOUNTER — ANESTHESIA EVENT (OUTPATIENT)
Dept: POSTOP/PACU | Facility: HOSPITAL | Age: 75
End: 2025-04-08
Payer: MEDICARE

## 2025-04-08 VITALS
OXYGEN SATURATION: 97 % | TEMPERATURE: 99 F | DIASTOLIC BLOOD PRESSURE: 88 MMHG | HEIGHT: 67 IN | SYSTOLIC BLOOD PRESSURE: 152 MMHG | WEIGHT: 176.38 LBS | HEART RATE: 87 BPM | RESPIRATION RATE: 11 BRPM | BODY MASS INDEX: 27.68 KG/M2

## 2025-04-08 DIAGNOSIS — F33.2 SEVERE RECURRENT MAJOR DEPRESSION WITHOUT PSYCHOTIC FEATURES (HCC): ICD-10-CM

## 2025-04-08 RX ORDER — ACETAMINOPHEN 500 MG
1000 TABLET ORAL ONCE AS NEEDED
Status: ACTIVE | OUTPATIENT
Start: 2025-04-08 | End: 2025-04-08

## 2025-04-08 RX ORDER — HYDRALAZINE HYDROCHLORIDE 20 MG/ML
10 INJECTION INTRAMUSCULAR; INTRAVENOUS ONCE
Status: COMPLETED | OUTPATIENT
Start: 2025-04-08 | End: 2025-04-08

## 2025-04-08 RX ORDER — KETAMINE HYDROCHLORIDE 50 MG/ML
INJECTION, SOLUTION INTRAMUSCULAR; INTRAVENOUS AS NEEDED
Status: DISCONTINUED | OUTPATIENT
Start: 2025-04-08 | End: 2025-04-08 | Stop reason: SURG

## 2025-04-08 RX ORDER — CAFFEINE AND SODIUM BENZOATE 125 MG/ML
750 INJECTION, SOLUTION INTRAMUSCULAR; INTRAVENOUS ONCE
Status: COMPLETED | OUTPATIENT
Start: 2025-04-08 | End: 2025-04-08

## 2025-04-08 RX ORDER — HYDRALAZINE HYDROCHLORIDE 20 MG/ML
INJECTION INTRAMUSCULAR; INTRAVENOUS
Status: COMPLETED
Start: 2025-04-08 | End: 2025-04-08

## 2025-04-08 RX ORDER — HYDROMORPHONE HYDROCHLORIDE 1 MG/ML
0.2 INJECTION, SOLUTION INTRAMUSCULAR; INTRAVENOUS; SUBCUTANEOUS EVERY 5 MIN PRN
Status: ACTIVE | OUTPATIENT
Start: 2025-04-08 | End: 2025-04-08

## 2025-04-08 RX ORDER — LABETALOL HYDROCHLORIDE 5 MG/ML
INJECTION, SOLUTION INTRAVENOUS AS NEEDED
Status: DISCONTINUED | OUTPATIENT
Start: 2025-04-08 | End: 2025-04-08 | Stop reason: SURG

## 2025-04-08 RX ORDER — SODIUM CHLORIDE, SODIUM LACTATE, POTASSIUM CHLORIDE, CALCIUM CHLORIDE 600; 310; 30; 20 MG/100ML; MG/100ML; MG/100ML; MG/100ML
INJECTION, SOLUTION INTRAVENOUS CONTINUOUS
Status: DISCONTINUED | OUTPATIENT
Start: 2025-04-08 | End: 2025-04-10

## 2025-04-08 RX ORDER — ONDANSETRON 2 MG/ML
8 INJECTION INTRAMUSCULAR; INTRAVENOUS ONCE
Status: COMPLETED | OUTPATIENT
Start: 2025-04-08 | End: 2025-04-08

## 2025-04-08 RX ORDER — HYDROMORPHONE HYDROCHLORIDE 1 MG/ML
0.4 INJECTION, SOLUTION INTRAMUSCULAR; INTRAVENOUS; SUBCUTANEOUS EVERY 5 MIN PRN
Status: ACTIVE | OUTPATIENT
Start: 2025-04-08 | End: 2025-04-08

## 2025-04-08 RX ORDER — LABETALOL HYDROCHLORIDE 5 MG/ML
5 INJECTION, SOLUTION INTRAVENOUS EVERY 5 MIN PRN
Status: ACTIVE | OUTPATIENT
Start: 2025-04-08 | End: 2025-04-08

## 2025-04-08 RX ORDER — KETOROLAC TROMETHAMINE 30 MG/ML
INJECTION, SOLUTION INTRAMUSCULAR; INTRAVENOUS
Status: COMPLETED
Start: 2025-04-08 | End: 2025-04-08

## 2025-04-08 RX ORDER — HYDROMORPHONE HYDROCHLORIDE 1 MG/ML
0.6 INJECTION, SOLUTION INTRAMUSCULAR; INTRAVENOUS; SUBCUTANEOUS EVERY 5 MIN PRN
Status: ACTIVE | OUTPATIENT
Start: 2025-04-08 | End: 2025-04-08

## 2025-04-08 RX ORDER — KETOROLAC TROMETHAMINE 30 MG/ML
15 INJECTION, SOLUTION INTRAMUSCULAR; INTRAVENOUS ONCE
Status: COMPLETED | OUTPATIENT
Start: 2025-04-08 | End: 2025-04-08

## 2025-04-08 RX ORDER — CAFFEINE AND SODIUM BENZOATE 125 MG/ML
INJECTION, SOLUTION INTRAMUSCULAR; INTRAVENOUS
Status: COMPLETED
Start: 2025-04-08 | End: 2025-04-08

## 2025-04-08 RX ORDER — METOPROLOL TARTRATE 1 MG/ML
2.5 INJECTION, SOLUTION INTRAVENOUS ONCE
Status: DISCONTINUED | OUTPATIENT
Start: 2025-04-08 | End: 2025-04-10

## 2025-04-08 RX ORDER — HYDROCODONE BITARTRATE AND ACETAMINOPHEN 5; 325 MG/1; MG/1
2 TABLET ORAL ONCE AS NEEDED
Status: ACTIVE | OUTPATIENT
Start: 2025-04-08 | End: 2025-04-08

## 2025-04-08 RX ORDER — ONDANSETRON 2 MG/ML
4 INJECTION INTRAMUSCULAR; INTRAVENOUS EVERY 6 HOURS PRN
Status: DISCONTINUED | OUTPATIENT
Start: 2025-04-08 | End: 2025-04-10

## 2025-04-08 RX ORDER — ONDANSETRON 2 MG/ML
INJECTION INTRAMUSCULAR; INTRAVENOUS
Status: COMPLETED
Start: 2025-04-08 | End: 2025-04-08

## 2025-04-08 RX ORDER — NALOXONE HYDROCHLORIDE 0.4 MG/ML
80 INJECTION, SOLUTION INTRAMUSCULAR; INTRAVENOUS; SUBCUTANEOUS AS NEEDED
Status: ACTIVE | OUTPATIENT
Start: 2025-04-08 | End: 2025-04-08

## 2025-04-08 RX ORDER — METOCLOPRAMIDE HYDROCHLORIDE 5 MG/ML
10 INJECTION INTRAMUSCULAR; INTRAVENOUS EVERY 8 HOURS PRN
Status: DISCONTINUED | OUTPATIENT
Start: 2025-04-08 | End: 2025-04-10

## 2025-04-08 RX ORDER — HYDROCODONE BITARTRATE AND ACETAMINOPHEN 5; 325 MG/1; MG/1
1 TABLET ORAL ONCE AS NEEDED
Status: ACTIVE | OUTPATIENT
Start: 2025-04-08 | End: 2025-04-08

## 2025-04-08 RX ORDER — ETOMIDATE 2 MG/ML
INJECTION INTRAVENOUS AS NEEDED
Status: DISCONTINUED | OUTPATIENT
Start: 2025-04-08 | End: 2025-04-08 | Stop reason: SURG

## 2025-04-08 RX ADMIN — SODIUM CHLORIDE, SODIUM LACTATE, POTASSIUM CHLORIDE, CALCIUM CHLORIDE: 600; 310; 30; 20 INJECTION, SOLUTION INTRAVENOUS at 05:56:00

## 2025-04-08 RX ADMIN — LABETALOL HYDROCHLORIDE 5 MG: 5 INJECTION, SOLUTION INTRAVENOUS at 06:58:00

## 2025-04-08 RX ADMIN — HYDRALAZINE HYDROCHLORIDE 10 MG: 20 INJECTION INTRAMUSCULAR; INTRAVENOUS at 06:05:00

## 2025-04-08 RX ADMIN — CAFFEINE AND SODIUM BENZOATE 750 MG: 125 INJECTION, SOLUTION INTRAMUSCULAR; INTRAVENOUS at 06:35:00

## 2025-04-08 RX ADMIN — KETAMINE HYDROCHLORIDE 25 MG: 50 INJECTION, SOLUTION INTRAMUSCULAR; INTRAVENOUS at 06:59:00

## 2025-04-08 RX ADMIN — ONDANSETRON 8 MG: 2 INJECTION INTRAMUSCULAR; INTRAVENOUS at 05:59:00

## 2025-04-08 RX ADMIN — SODIUM CHLORIDE, SODIUM LACTATE, POTASSIUM CHLORIDE, CALCIUM CHLORIDE: 600; 310; 30; 20 INJECTION, SOLUTION INTRAVENOUS at 06:50:00

## 2025-04-08 RX ADMIN — ETOMIDATE 14 MG: 2 INJECTION INTRAVENOUS at 06:59:00

## 2025-04-08 RX ADMIN — KETOROLAC TROMETHAMINE 15 MG: 30 INJECTION, SOLUTION INTRAMUSCULAR; INTRAVENOUS at 06:03:00

## 2025-04-08 NOTE — PROGRESS NOTES
[facility] / [facility]  ECT History & Physical    Robe Nunez Patient Status:  Outpatient   Age/Gender 74 year old male MRN [id number]   Location [facility] POST ANESTHESIA CARE UNIT Attending Bryn Taylor MD   Hosp Day # 0 PCP Mango Boland MD     Date: 4/8/2025    Diagnosis: Major depression recurrent severe    Procedure:  ECT    HPI:     Patient seen.  Patient reports no cognitive or physical complaints      Medical History:  Past Medical History:    Cancer (HCC)    Skin cancer    Depression    Headache disorder    High blood pressure    High cholesterol    HTN (hypertension)    Hyperlipidemia    Nonspecific elevation of levels of transaminase or lactic acid dehydrogenase (LDH)    s/p liver biopsy normal    Visual impairment    glasses       Surgical History:  Past Surgical History:   Procedure Laterality Date    Colonoscopy & polypectomy  05/09/2016    diverticulosis and a small polyp was removed; ASC    Colonoscopy,biopsy N/A 05/09/2016    Procedure: COLONOSCOPY, POSSIBLE BIOPSY, POSSIBLE POLYPECTOMY 58058;  Surgeon: Shaun Mock MD;  Location: [facility]    Ect provided Bilateral 12/20/2024    1st. Tx. 12/20/24    Ect(bedside)      Other surgical history      scope knee    Other surgical history  11/29/2024    Aquablation of the prostate    Patient documented not to have experienced any of the following events N/A 05/09/2016    Procedure: COLONOSCOPY, POSSIBLE BIOPSY, POSSIBLE POLYPECTOMY 45323;  Surgeon: Shaun Mock MD;  Location: [facility]    Patient withough preoperative order for iv antibiotic surgical site infection prophylaxis. N/A 05/09/2016    Procedure: COLONOSCOPY, POSSIBLE BIOPSY, POSSIBLE POLYPECTOMY 57923;  Surgeon: Shaun Mock MD;  Location: [facility]    Special service or report  1990s    R knee arthroscopy lat meniscus       Family History:  Family History   Problem Relation Age of Onset    Hypertension Father      Diabetes Father     Lipids Brother     Cancer Brother         prostate ca    Other (Other[other]) Sister         ?sle       ROS:  Unremarkable    Physical Exam:   /88   Pulse 87   Temp 99.1 °F (37.3 °C) (Temporal)   Resp 11   Ht 67\"   Wt 80 kg (176 lb 5.9 oz)   SpO2 97%   BMI 27.62 kg/m²     General Appearance:    Alert, cooperative, no distress, appears stated age   Head:    Normocephalic, without obvious abnormality, atraumatic   Eyes:    PERRL, conjunctiva/corneas clear, EOM's intact       Nose:   Nares normal, septum midline, mucosa normal, no drainage    or sinus tenderness   Throat:   Lips, mucosa, and tongue normal; teeth and gums normal   Neck:   Supple, symmetrical, trachea midline   Lungs:     Clear to auscultation bilaterally, respirations unlabored    Heart:    Regular rate and rhythm, S1 and S2 normal, no murmur, rub   or gallop   Abdomen:     Soft, non-tender, bowel sounds active all four quadrants,     no masses, no organomegaly   Extremities:   Extremities normal, atraumatic, no cyanosis or edema   Pulses:   2+ and symmetric all extremities   Skin:   Skin color, texture, turgor normal, no rashes or lesions   Neurologic:   CNII-XII intact, normal strength, sensation and reflexes     throughout     Impressions & Plans: Major depression recurrent severe.  Bitemporal ECT    I have discussed the risks and benefits and alternatives with the patient/family.  They understand and agree to proceed with plan of care.    Bryn Taylor MD

## 2025-04-08 NOTE — ANESTHESIA POSTPROCEDURE EVALUATION
[facility]    Robe Nunez Patient Status:  Outpatient   Age/Gender 74 year old male MRN [id number]   Location [facility] POST ANESTHESIA CARE UNIT Attending Bryn Taylor MD   Hosp Day # 0 PCP aMngo Boland MD       Anesthesia Post-op Note        Procedure Summary       Date: 04/08/25 Room / Location: [facility] Post Anesthesia Care Unit    Anesthesia Start: 0650 Anesthesia Stop: 0703    Procedure: ECT(BEDSIDE) Diagnosis: Severe recurrent major depression without psychotic features (HCC)    Scheduled Providers:  Anesthesiologist: Hugh Hurt MD    Anesthesia Type: general ASA Status: 3            Anesthesia Type: general    Vitals Value Taken Time   /86 04/08/25 0705   Temp  04/08/25 0705   Pulse 72 04/08/25 0705   Resp 18 04/08/25 0705   SpO2 98 04/08/25 0705           Patient Location: PACU    Anesthesia Type: general    Airway Patency: patent    Postop Pain Control: adequate    Mental Status: mildly sedated but able to meaningfully participate in the post-anesthesia evaluation    Nausea/Vomiting: none    Cardiopulmonary/Hydration status: stable euvolemic    Complications: no apparent anesthesia related complications    Postop vital signs: stable    Dental Exam: Unchanged from Preop    Patient to be discharged from PACU when criteria met.

## 2025-04-08 NOTE — DISCHARGE INSTRUCTIONS
Discharge Instructions  Electroconvulsive Therapy    Activities:  You MUST arrange to have a responsible adult drive you home and have a responsible adult stay with you the rest of the day and overnight.  Do not drive today.  Do not operate any machinery today. Use kitchen equipment with caution.  Rest and take it easy today.  Do not take public transportation without the presence of another responsible adult for 24 hours    Medications:  Resume your regular medications when you get home  The nurse will instruct you not to take any NSAIDS (Advil, Aleve, Motrin, Ibuprofen) before 1 pm today because you were given a certain medication during the procedure.      Diet:  You may resume your regular diet when you get home  Do not drink alcohol for 24 hours    Additional Instructions:  Someone should call you to schedule any upcoming ECT treatments. Call as needed to schedule or cancel your ECT appointments [phone].  If you have any questions or concerns, please call your own psychiatrist.  For your safety, please do not wear make-up to any future ECT appointments.  For any questions regarding your ECT appointment, please call [facility] [phone].  For cancellations after hours, call [phone] and leave a message.    Expected Recovery:  As you awaken, you may experience one or more of the following:  Headache, nausea, temporary confusion, or muscle stiffness.  If these symptoms increase, become severe or are accompanied by a fever of more than 101, please seek medical attention.  The ECT may affect memory.  Many patients report loss of memory for events that occurred in the days, weeks or months surrounding the ECT.  Many of these memories may return, but not always.  Short-term memory may also be affected for months, but this can also be a result of the disorder that you have.

## 2025-04-08 NOTE — PROGRESS NOTES
[facility] / [facility]  ECT Procedure Note    Robe Nunez Patient Status:  Outpatient   Age/Gender 74 year old male MRN [id number]   Location [facility] POST ANESTHESIA CARE UNIT Attending Bryn Taylor MD   Hosp Day # 0 PCP Mango Boland MD     4/8/2025    ECT Number: #17 total.  #6 in series    Diagnosis: Major Depression Recurrent Severe Without Psychotic Features. F33.2    Type of ECT:  Bitemporal    Place of Service:  Outpatient    Settings:   1.  Energy Percentage: 100%    2.  Program:  DGX    Pre-ECT Evaluation    Symptoms:  Patient seen.  Patient with variable course.  Overall while patient has shown significant improvement, since last ECT, there is been a mild increase in dysphoria and anxiety.  Also a mild increase in obsessional thinking.  No suicidal thoughts.  No pato or psychosis.  No cognitive or physical complaints.  Patient shows capacity to make decisions.  We discussed treatment options and plan on ECT on Thursday.    Risk/Benefits:  Discussed with patient side effects of ECT including headache, teeth, jaw, cardiac, pulmonary, NPO, aspiration, allergic reactions, anesthetic reactions, musculoskeletal, neurologic, morbidity/mortality, potential lack of efficacy, unilateral/bilateral ECT, relapse/maintenance issues, cognitive risks including memory, concentration, cognition, and other risks.    Side Effects:  Patient notes no cognitive/physical complaints    Exam:  Mood: somewhat more depressed  Affect: Congruent  Memory:  intact immediate, recent, remote and as evidenced by ability to present consistent history  Concentration:   good  Suicidal ideation: no suicidal ideation    Prior to procedure, reviewed with treatment team correct patient, time of procedure and type of ECT.  Also reviewed with anesthesia pre-ECT medications.    Patient Monitored:  B/P, EKG, EEG, Pulse Ox, Left Ankle Cuff    Pre-ECT Medications: Toradol 15 mg IV, Hydralazine 10 mg IV 30 min prior to  treatment, and Zofran 8 mg IV and caffeine 750 mg IV    ECT Medications:  Labetalol 5 mg IV, Anesthetic  Etomidate 14 mg IV followed by ketamine 25 mg IV, and Succinylcholine 80 mg IV    Seizure Duration:  Motor: 30 seconds       EE seconds    Post-ECT Condition:  Treatment unremarkable    Post-ECT Medications:  None     Bryn Taylor MD

## 2025-04-08 NOTE — ANESTHESIA PREPROCEDURE EVALUATION
PRE-OP EVALUATION    Patient Name: Robe Nunez    Admit Diagnosis: Severe recurrent major depression without psychotic features (HCC) [F33.2]    Pre-op Diagnosis: * No pre-op diagnosis entered *        Anesthesia Procedure: ECT(BEDSIDE)    * No surgeons found in log *    Pre-op vitals reviewed.  Temp: 97.2 °F (36.2 °C)  Pulse: 82  Resp: 10  BP: 136/86  SpO2: 96 %  Body mass index is 27.62 kg/m².    Current medications reviewed.  Hospital Medications:   lactated ringers infusion   Intravenous Continuous    [COMPLETED] ketorolac (Toradol) 30 MG/ML injection 15 mg  15 mg Intravenous Once    [COMPLETED] ondansetron (Zofran) 4 MG/2ML injection 8 mg  8 mg Intravenous Once    [COMPLETED] caffeine-sodium benzoate 125-125 mg/mL injection  750 mg Intravenous Once    [COMPLETED] hydrALAzine (Apresoline) 20 mg/mL injection 10 mg  10 mg Intravenous Once       Outpatient Medications:   Prescriptions Prior to Admission[1]    Allergies: Other and Seasonal      Anesthesia Evaluation        Anesthetic Complications           GI/Hepatic/Renal    Negative GI/hepatic/renal ROS.                             Cardiovascular      ECG reviewed.  Exercise tolerance: good     MET: >4      (+) hypertension                                     Endo/Other                                  Pulmonary    Negative pulmonary ROS.                       Neuro/Psych      (+) depression                                Past Surgical History:   Procedure Laterality Date    Colonoscopy & polypectomy  05/09/2016    diverticulosis and a small polyp was removed; ASC    Colonoscopy,biopsy N/A 05/09/2016    Procedure: COLONOSCOPY, POSSIBLE BIOPSY, POSSIBLE POLYPECTOMY 36991;  Surgeon: Shaun Mock MD;  Location[facility]    Ect provided Bilateral 12/20/2024    1st. Tx. 12/20/24    Ect(bedside)      Other surgical history      scope knee    Other surgical history  11/29/2024    Aquablation of the prostate    Patient documented not to have  experienced any of the following events N/A 05/09/2016    Procedure: COLONOSCOPY, POSSIBLE BIOPSY, POSSIBLE POLYPECTOMY 32061;  Surgeon: Shaun Mock MD;  Location: [facility]    Patient withough preoperative order for iv antibiotic surgical site infection prophylaxis. N/A 05/09/2016    Procedure: COLONOSCOPY, POSSIBLE BIOPSY, POSSIBLE POLYPECTOMY 60832;  Surgeon: Shaun Mock MD;  Location: [facility]    Special service or report  1990s    R knee arthroscopy lat meniscus     Social History     Socioeconomic History    Marital status:    Tobacco Use    Smoking status: Never    Smokeless tobacco: Never   Vaping Use    Vaping status: Unknown   Substance and Sexual Activity    Alcohol use: No     Alcohol/week: 0.0 - 1.0 standard drinks of alcohol     Comment: rare    Drug use: No    Sexual activity: Yes     History   Drug Use No     Available pre-op labs reviewed.  Lab Results   Component Value Date    WBC 6.6 03/23/2025    RBC 4.41 03/23/2025    HGB 12.8 (L) 03/23/2025    HCT 36.4 (L) 03/23/2025    MCV 82.5 03/23/2025    MCH 29.0 03/23/2025    MCHC 35.2 03/23/2025    RDW 12.2 03/23/2025    .0 03/23/2025     Lab Results   Component Value Date     03/23/2025    K 3.9 03/23/2025     03/23/2025    CO2 25.0 03/23/2025    BUN 12 03/23/2025    CREATSERUM 0.97 03/23/2025     (H) 03/23/2025    CA 10.0 03/23/2025     Lab Results   Component Value Date    INR 1.06 03/21/2025         Airway      Mallampati: III  Mouth opening: >3 FB  TM distance: > 6 cm  Neck ROM: full Cardiovascular    Cardiovascular exam normal.         Dental    Dentition appears grossly intact         Pulmonary    Pulmonary exam normal.                 Other findings              ASA: 3   Plan: general  NPO status verified and patient meets guidelines.          Plan/risks discussed with: patient                Present on Admission:  **None**             [1] (Not in a hospital admission)

## 2025-04-10 ENCOUNTER — ANESTHESIA EVENT (OUTPATIENT)
Dept: POSTOP/PACU | Facility: HOSPITAL | Age: 75
End: 2025-04-10
Payer: MEDICARE

## 2025-04-10 ENCOUNTER — HOSPITAL ENCOUNTER (OUTPATIENT)
Dept: POSTOP/PACU | Facility: HOSPITAL | Age: 75
Discharge: HOME | End: 2025-04-10
Attending: Other
Payer: MEDICARE

## 2025-04-10 ENCOUNTER — ANESTHESIA (OUTPATIENT)
Dept: POSTOP/PACU | Facility: HOSPITAL | Age: 75
End: 2025-04-10
Payer: MEDICARE

## 2025-04-10 VITALS
OXYGEN SATURATION: 97 % | SYSTOLIC BLOOD PRESSURE: 156 MMHG | DIASTOLIC BLOOD PRESSURE: 88 MMHG | RESPIRATION RATE: 13 BRPM | HEIGHT: 67 IN | TEMPERATURE: 99 F | HEART RATE: 78 BPM | BODY MASS INDEX: 27 KG/M2

## 2025-04-10 DIAGNOSIS — F33.2 SEVERE RECURRENT MAJOR DEPRESSION WITHOUT PSYCHOTIC FEATURES (HCC): ICD-10-CM

## 2025-04-10 RX ORDER — NALOXONE HYDROCHLORIDE 0.4 MG/ML
0.08 INJECTION, SOLUTION INTRAMUSCULAR; INTRAVENOUS; SUBCUTANEOUS AS NEEDED
Status: CANCELLED | OUTPATIENT
Start: 2025-04-10 | End: 2025-04-10

## 2025-04-10 RX ORDER — MIDAZOLAM HYDROCHLORIDE 1 MG/ML
1 INJECTION INTRAMUSCULAR; INTRAVENOUS EVERY 5 MIN PRN
Status: CANCELLED | OUTPATIENT
Start: 2025-04-10 | End: 2025-04-10

## 2025-04-10 RX ORDER — HYDROMORPHONE HYDROCHLORIDE 1 MG/ML
0.2 INJECTION, SOLUTION INTRAMUSCULAR; INTRAVENOUS; SUBCUTANEOUS EVERY 5 MIN PRN
Refills: 0 | Status: CANCELLED | OUTPATIENT
Start: 2025-04-10 | End: 2025-04-10

## 2025-04-10 RX ORDER — ONDANSETRON 2 MG/ML
8 INJECTION INTRAMUSCULAR; INTRAVENOUS ONCE
Status: COMPLETED | OUTPATIENT
Start: 2025-04-10 | End: 2025-04-10

## 2025-04-10 RX ORDER — ONDANSETRON 2 MG/ML
INJECTION INTRAMUSCULAR; INTRAVENOUS
Status: COMPLETED
Start: 2025-04-10 | End: 2025-04-10

## 2025-04-10 RX ORDER — ONDANSETRON 2 MG/ML
4 INJECTION INTRAMUSCULAR; INTRAVENOUS EVERY 6 HOURS PRN
Status: CANCELLED | OUTPATIENT
Start: 2025-04-10

## 2025-04-10 RX ORDER — HYDROMORPHONE HYDROCHLORIDE 1 MG/ML
0.6 INJECTION, SOLUTION INTRAMUSCULAR; INTRAVENOUS; SUBCUTANEOUS EVERY 5 MIN PRN
Refills: 0 | Status: CANCELLED | OUTPATIENT
Start: 2025-04-10 | End: 2025-04-10

## 2025-04-10 RX ORDER — HYDRALAZINE HYDROCHLORIDE 20 MG/ML
INJECTION INTRAMUSCULAR; INTRAVENOUS
Status: COMPLETED
Start: 2025-04-10 | End: 2025-04-10

## 2025-04-10 RX ORDER — METOCLOPRAMIDE HYDROCHLORIDE 5 MG/ML
10 INJECTION INTRAMUSCULAR; INTRAVENOUS EVERY 8 HOURS PRN
Status: CANCELLED | OUTPATIENT
Start: 2025-04-10

## 2025-04-10 RX ORDER — SODIUM CHLORIDE, SODIUM LACTATE, POTASSIUM CHLORIDE, CALCIUM CHLORIDE 600; 310; 30; 20 MG/100ML; MG/100ML; MG/100ML; MG/100ML
INJECTION, SOLUTION INTRAVENOUS CONTINUOUS
Status: CANCELLED | OUTPATIENT
Start: 2025-04-10

## 2025-04-10 RX ORDER — SODIUM CHLORIDE, SODIUM LACTATE, POTASSIUM CHLORIDE, CALCIUM CHLORIDE 600; 310; 30; 20 MG/100ML; MG/100ML; MG/100ML; MG/100ML
INJECTION, SOLUTION INTRAVENOUS CONTINUOUS
Status: DISCONTINUED | OUTPATIENT
Start: 2025-04-10 | End: 2025-04-12

## 2025-04-10 RX ORDER — KETAMINE HYDROCHLORIDE 50 MG/ML
INJECTION, SOLUTION INTRAMUSCULAR; INTRAVENOUS AS NEEDED
Status: DISCONTINUED | OUTPATIENT
Start: 2025-04-10 | End: 2025-04-10 | Stop reason: SURG

## 2025-04-10 RX ORDER — LIDOCAINE HYDROCHLORIDE 10 MG/ML
INJECTION, SOLUTION EPIDURAL; INFILTRATION; INTRACAUDAL; PERINEURAL AS NEEDED
Status: DISCONTINUED | OUTPATIENT
Start: 2025-04-10 | End: 2025-04-10 | Stop reason: SURG

## 2025-04-10 RX ORDER — CAFFEINE AND SODIUM BENZOATE 125 MG/ML
750 INJECTION, SOLUTION INTRAMUSCULAR; INTRAVENOUS ONCE
Status: COMPLETED | OUTPATIENT
Start: 2025-04-10 | End: 2025-04-10

## 2025-04-10 RX ORDER — KETOROLAC TROMETHAMINE 30 MG/ML
INJECTION, SOLUTION INTRAMUSCULAR; INTRAVENOUS
Status: COMPLETED
Start: 2025-04-10 | End: 2025-04-10

## 2025-04-10 RX ORDER — ETOMIDATE 2 MG/ML
INJECTION INTRAVENOUS AS NEEDED
Status: DISCONTINUED | OUTPATIENT
Start: 2025-04-10 | End: 2025-04-10 | Stop reason: SURG

## 2025-04-10 RX ORDER — KETOROLAC TROMETHAMINE 30 MG/ML
15 INJECTION, SOLUTION INTRAMUSCULAR; INTRAVENOUS ONCE
Status: COMPLETED | OUTPATIENT
Start: 2025-04-10 | End: 2025-04-10

## 2025-04-10 RX ORDER — HYDRALAZINE HYDROCHLORIDE 20 MG/ML
10 INJECTION INTRAMUSCULAR; INTRAVENOUS ONCE
Status: COMPLETED | OUTPATIENT
Start: 2025-04-10 | End: 2025-04-10

## 2025-04-10 RX ORDER — CAFFEINE AND SODIUM BENZOATE 125 MG/ML
INJECTION, SOLUTION INTRAMUSCULAR; INTRAVENOUS
Status: COMPLETED
Start: 2025-04-10 | End: 2025-04-10

## 2025-04-10 RX ORDER — HYDROMORPHONE HYDROCHLORIDE 1 MG/ML
0.4 INJECTION, SOLUTION INTRAMUSCULAR; INTRAVENOUS; SUBCUTANEOUS EVERY 5 MIN PRN
Refills: 0 | Status: CANCELLED | OUTPATIENT
Start: 2025-04-10 | End: 2025-04-10

## 2025-04-10 RX ADMIN — SODIUM CHLORIDE, SODIUM LACTATE, POTASSIUM CHLORIDE, CALCIUM CHLORIDE: 600; 310; 30; 20 INJECTION, SOLUTION INTRAVENOUS at 06:09:00

## 2025-04-10 RX ADMIN — LIDOCAINE HYDROCHLORIDE 5 MG: 10 INJECTION, SOLUTION EPIDURAL; INFILTRATION; INTRACAUDAL; PERINEURAL at 06:55:00

## 2025-04-10 RX ADMIN — KETAMINE HYDROCHLORIDE 25 MG: 50 INJECTION, SOLUTION INTRAMUSCULAR; INTRAVENOUS at 06:55:00

## 2025-04-10 RX ADMIN — KETOROLAC TROMETHAMINE 15 MG: 30 INJECTION, SOLUTION INTRAMUSCULAR; INTRAVENOUS at 06:10:00

## 2025-04-10 RX ADMIN — ONDANSETRON 8 MG: 2 INJECTION INTRAMUSCULAR; INTRAVENOUS at 06:10:00

## 2025-04-10 RX ADMIN — CAFFEINE AND SODIUM BENZOATE 750 MG: 125 INJECTION, SOLUTION INTRAMUSCULAR; INTRAVENOUS at 06:11:00

## 2025-04-10 RX ADMIN — HYDRALAZINE HYDROCHLORIDE 10 MG: 20 INJECTION INTRAMUSCULAR; INTRAVENOUS at 06:10:00

## 2025-04-10 RX ADMIN — ETOMIDATE 10 MG: 2 INJECTION INTRAVENOUS at 06:55:00

## 2025-04-10 NOTE — PROGRESS NOTES
[facility] / [facility]  ECT History & Physical    Robe Nunez Patient Status:  Outpatient   Age/Gender 74 year old male MRN [id number]   Location [facility] POST ANESTHESIA CARE UNIT Attending Bryn Taylor MD   Hosp Day # 0 PCP Mango Boland MD     Date: 4/10/2025    Diagnosis: Major depression recurrent severe    Procedure:  ECT    HPI:     Patient seen.  Patient reports no cognitive or physical complaints      Medical History:  Past Medical History[1]    Surgical History:  Past Surgical History[2]    Family History:  Family History[3]    ROS:  Unremarkable    Physical Exam:   /85   Pulse 85   Temp 99 °F (37.2 °C) (Temporal)   Resp 14   Ht 67\"   SpO2 96%   BMI 27.10 kg/m²     General Appearance:    Alert, cooperative, no distress, appears stated age   Head:    Normocephalic, without obvious abnormality, atraumatic   Eyes:    PERRL, conjunctiva/corneas clear, EOM's intact       Nose:   Nares normal, septum midline, mucosa normal, no drainage    or sinus tenderness   Throat:   Lips, mucosa, and tongue normal; teeth and gums normal   Neck:   Supple, symmetrical, trachea midline   Lungs:     Clear to auscultation bilaterally, respirations unlabored    Heart:    Regular rate and rhythm, S1 and S2 normal, no murmur, rub   or gallop   Abdomen:     Soft, non-tender, bowel sounds active all four quadrants,     no masses, no organomegaly   Extremities:   Extremities normal, atraumatic, no cyanosis or edema   Pulses:   2+ and symmetric all extremities   Skin:   Skin color, texture, turgor normal, no rashes or lesions   Neurologic:   CNII-XII intact, normal strength, sensation and reflexes     throughout     Impressions & Plans: Major depression recurrent severe.  Bitemporal ECT    I have discussed the risks and benefits and alternatives with the patient/family.  They understand and agree to proceed with plan of care.    Bryn Taylor MD                   [1]   Past Medical History:   Cancer (HCC)     Skin cancer    Depression    Headache disorder    High blood pressure    High cholesterol    HTN (hypertension)    Hyperlipidemia    Nonspecific elevation of levels of transaminase or lactic acid dehydrogenase (LDH)    s/p liver biopsy normal    Visual impairment    glasses   [2]   Past Surgical History:  Procedure Laterality Date    Colonoscopy & polypectomy  05/09/2016    diverticulosis and a small polyp was removed; ASC    Colonoscopy,biopsy N/A 05/09/2016    Procedure: COLONOSCOPY, POSSIBLE BIOPSY, POSSIBLE POLYPECTOMY 06069;  Surgeon: Shaun Mock MD;  Location: [facility]    Ect provided Bilateral 12/20/2024    1st. Tx. 12/20/24    Ect(bedside)      Other surgical history      scope knee    Other surgical history  11/29/2024    Aquablation of the prostate    Patient documented not to have experienced any of the following events N/A 05/09/2016    Procedure: COLONOSCOPY, POSSIBLE BIOPSY, POSSIBLE POLYPECTOMY 02927;  Surgeon: Shaun Mock MD;  Location: [facility]    Patient withough preoperative order for iv antibiotic surgical site infection prophylaxis. N/A 05/09/2016    Procedure: COLONOSCOPY, POSSIBLE BIOPSY, POSSIBLE POLYPECTOMY 31914;  Surgeon: Shaun Mock MD;  Location: [facility]    Special service or report  1990s    R knee arthroscopy lat meniscus   [3]   Family History  Problem Relation Age of Onset    Hypertension Father     Diabetes Father     Lipids Brother     Cancer Brother         prostate ca    Other (Other[other]) Sister         ?sle

## 2025-04-10 NOTE — ANESTHESIA POSTPROCEDURE EVALUATION
[facility]    Robe Nunez Patient Status:  Outpatient   Age/Gender 74 year old male MRN [id number]   Location [facility] POST ANESTHESIA CARE UNIT Attending Bryn Taylor MD   Hosp Day # 0 PCP Mango Boland MD       Anesthesia Post-op Note        Procedure Summary       Date: 04/10/25 Room / Location: [facility] Post Anesthesia Care Unit    Anesthesia Start: 0650 Anesthesia Stop:     Procedure: ECT(BEDSIDE) Diagnosis: Severe recurrent major depression without psychotic features (HCC)    Scheduled Providers:  Anesthesiologist: Elder Shahid MD    Anesthesia Type: general ASA Status: 2            Anesthesia Type: general    Vitals Value Taken Time   /86 04/10/25 07:09   Temp 98 04/10/25 07:09   Pulse 62 04/10/25 07:09   Resp 18 04/10/25 07:09   SpO2 98 04/10/25 07:09           Patient Location: PACU    Anesthesia Type: general    Airway Patency: patent    Postop Pain Control: adequate    Mental Status: mildly sedated but able to meaningfully participate in the post-anesthesia evaluation    Nausea/Vomiting: none    Cardiopulmonary/Hydration status: stable euvolemic    Complications: no apparent anesthesia related complications    Postop vital signs: stable    Dental Exam: Unchanged from Preop    Patient to be discharged from PACU when criteria met.

## 2025-04-10 NOTE — DISCHARGE INSTRUCTIONS
Discharge Instructions  Electroconvulsive Therapy    Activities:  You MUST arrange to have a responsible adult drive you home and have a responsible adult stay with you the rest of the day and overnight.  Do not drive today.  Do not operate any machinery today. Use kitchen equipment with caution.  Rest and take it easy today.  Do not take public transportation without the presence of another responsible adult for 24 hours    Medications:  Resume your regular medications when you get home  The nurse will instruct you not to take any NSAIDS (Advil, Aleve, Motrin, Ibuprofen) before 1 pm today because you were given a certain medication during the procedure.      Diet:  You may resume your regular diet when you get home  Do not drink alcohol for 24 hours    Additional Instructions:  Someone should call you to schedule any upcoming ECT treatments. Call as needed to schedule or cancel your ECT appointments [phone].  If you have any questions or concerns, please call your own psychiatrist.  For your safety, please do not wear make-up to any future ECT appointments.  For any questions regarding your ECT appointment, please call [facility] [phone].  For cancellations after hours, call [phone] and leave a message.    Expected Recovery:  As you awaken, you may experience one or more of the following:  Headache, nausea, temporary confusion, or muscle stiffness.  If these symptoms increase, become severe or are accompanied by a fever of more than 101, please seek medical attention.  The ECT may affect memory.  Many patients report loss of memory for events that occurred in the days, weeks or months surrounding the ECT.  Many of these memories may return, but not always.  Short-term memory may also be affected for months, but this can also be a result of the disorder that you have.

## 2025-04-10 NOTE — PROGRESS NOTES
[facility] / [facility]  ECT Procedure Note    Robe Nunez Patient Status:  Outpatient   Age/Gender 74 year old male MRN [id number]   Location [facility] POST ANESTHESIA CARE UNIT Attending Bryn Taylor MD   Hosp Day # 0 PCP Mango Boland MD     4/10/2025    ECT Number: #18 total.  #7 in series    Diagnosis: Major Depression Recurrent Severe Without Psychotic Features. F33.2    Type of ECT:  Bitemporal    Place of Service:  Outpatient    Settings:   1.  Energy Percentage: 100%    2.  Program:  DGX    Pre-ECT Evaluation    Symptoms:  Patient seen.  Overall patient noted to have significant improvement in mood today after the earlier ECT this week.  Patient noted a decrease in neurovegetative symptoms and anxiety.  Less obsessional thinking.  Patient is starting to feel like his old self.  Patient noted no pato or psychosis.  No suicidal thoughts.  Patient reports no cognitive or physical complaints.  Patient shows capacity to make decisions.  Discussed treatment options and scheduled 2 ECT next week with potential discontinuing the second 1 and going to weekly.    Risk/Benefits:  Discussed with patient side effects of ECT including headache, teeth, jaw, cardiac, pulmonary, NPO, aspiration, allergic reactions, anesthetic reactions, musculoskeletal, neurologic, morbidity/mortality, potential lack of efficacy, unilateral/bilateral ECT, relapse/maintenance issues, cognitive risks including memory, concentration, cognition, and other risks.    Side Effects:  Patient notes no cognitive/physical complaints    Exam:  Mood: less depressed and less anxious  Affect: Congruent  Memory:  intact immediate, recent, remote and as evidenced by ability to present consistent history  Concentration:   good  Suicidal ideation: no suicidal ideation    Prior to procedure, reviewed with treatment team correct patient, time of procedure and type of ECT.  Also reviewed with anesthesia pre-ECT medications.    Patient  Monitored:  B/P, EKG, EEG, Pulse Ox, Left Ankle Cuff    Pre-ECT Medications: Toradol 15 mg IV, Hydralazine 10 mg IV 30 min prior to treatment, and Zofran 8 mg IV and caffeine 750 mg IV    ECT Medications:  Labetalol 5 mg IV, Anesthetic  Etomidate 14 mg IV followed by ketamine 25 mg IV, and Succinylcholine 80 mg IV    Seizure Duration:  Motor: 21 seconds       EE seconds    Post-ECT Condition:  Treatment unremarkable    Post-ECT Medications:  None     Bryn Taylor MD

## 2025-04-15 ENCOUNTER — HOSPITAL ENCOUNTER (OUTPATIENT)
Dept: POSTOP/PACU | Facility: HOSPITAL | Age: 75
Discharge: HOME | End: 2025-04-15
Attending: Other
Payer: MEDICARE

## 2025-04-15 ENCOUNTER — ANESTHESIA (OUTPATIENT)
Dept: POSTOP/PACU | Facility: HOSPITAL | Age: 75
End: 2025-04-15
Payer: MEDICARE

## 2025-04-15 ENCOUNTER — ANESTHESIA EVENT (OUTPATIENT)
Dept: POSTOP/PACU | Facility: HOSPITAL | Age: 75
End: 2025-04-15
Payer: MEDICARE

## 2025-04-15 VITALS
HEIGHT: 67 IN | OXYGEN SATURATION: 97 % | DIASTOLIC BLOOD PRESSURE: 84 MMHG | HEART RATE: 59 BPM | TEMPERATURE: 98 F | RESPIRATION RATE: 11 BRPM | WEIGHT: 176.38 LBS | BODY MASS INDEX: 27.68 KG/M2 | SYSTOLIC BLOOD PRESSURE: 150 MMHG

## 2025-04-15 DIAGNOSIS — F33.2 SEVERE RECURRENT MAJOR DEPRESSION WITHOUT PSYCHOTIC FEATURES (HCC): ICD-10-CM

## 2025-04-15 RX ORDER — HYDROMORPHONE HYDROCHLORIDE 1 MG/ML
0.2 INJECTION, SOLUTION INTRAMUSCULAR; INTRAVENOUS; SUBCUTANEOUS EVERY 5 MIN PRN
Status: ACTIVE | OUTPATIENT
Start: 2025-04-15 | End: 2025-04-15

## 2025-04-15 RX ORDER — CAFFEINE AND SODIUM BENZOATE 125 MG/ML
INJECTION, SOLUTION INTRAMUSCULAR; INTRAVENOUS
Status: COMPLETED
Start: 2025-04-15 | End: 2025-04-15

## 2025-04-15 RX ORDER — HYDROMORPHONE HYDROCHLORIDE 1 MG/ML
0.4 INJECTION, SOLUTION INTRAMUSCULAR; INTRAVENOUS; SUBCUTANEOUS EVERY 5 MIN PRN
Status: ACTIVE | OUTPATIENT
Start: 2025-04-15 | End: 2025-04-15

## 2025-04-15 RX ORDER — SODIUM CHLORIDE, SODIUM LACTATE, POTASSIUM CHLORIDE, CALCIUM CHLORIDE 600; 310; 30; 20 MG/100ML; MG/100ML; MG/100ML; MG/100ML
INJECTION, SOLUTION INTRAVENOUS CONTINUOUS
Status: DISCONTINUED | OUTPATIENT
Start: 2025-04-15 | End: 2025-04-17

## 2025-04-15 RX ORDER — ONDANSETRON 2 MG/ML
INJECTION INTRAMUSCULAR; INTRAVENOUS
Status: COMPLETED
Start: 2025-04-15 | End: 2025-04-15

## 2025-04-15 RX ORDER — METOCLOPRAMIDE HYDROCHLORIDE 5 MG/ML
10 INJECTION INTRAMUSCULAR; INTRAVENOUS EVERY 8 HOURS PRN
Status: DISCONTINUED | OUTPATIENT
Start: 2025-04-15 | End: 2025-04-17

## 2025-04-15 RX ORDER — NALOXONE HYDROCHLORIDE 0.4 MG/ML
0.08 INJECTION, SOLUTION INTRAMUSCULAR; INTRAVENOUS; SUBCUTANEOUS AS NEEDED
Status: ACTIVE | OUTPATIENT
Start: 2025-04-15 | End: 2025-04-15

## 2025-04-15 RX ORDER — ETOMIDATE 2 MG/ML
INJECTION INTRAVENOUS AS NEEDED
Status: DISCONTINUED | OUTPATIENT
Start: 2025-04-15 | End: 2025-04-15 | Stop reason: SURG

## 2025-04-15 RX ORDER — HYDROMORPHONE HYDROCHLORIDE 1 MG/ML
0.6 INJECTION, SOLUTION INTRAMUSCULAR; INTRAVENOUS; SUBCUTANEOUS EVERY 5 MIN PRN
Status: ACTIVE | OUTPATIENT
Start: 2025-04-15 | End: 2025-04-15

## 2025-04-15 RX ORDER — CAFFEINE AND SODIUM BENZOATE 125 MG/ML
750 INJECTION, SOLUTION INTRAMUSCULAR; INTRAVENOUS ONCE
Status: COMPLETED | OUTPATIENT
Start: 2025-04-15 | End: 2025-04-15

## 2025-04-15 RX ORDER — MIDAZOLAM HYDROCHLORIDE 1 MG/ML
1 INJECTION INTRAMUSCULAR; INTRAVENOUS EVERY 5 MIN PRN
Status: ACTIVE | OUTPATIENT
Start: 2025-04-15 | End: 2025-04-15

## 2025-04-15 RX ORDER — ONDANSETRON 2 MG/ML
8 INJECTION INTRAMUSCULAR; INTRAVENOUS ONCE
Status: COMPLETED | OUTPATIENT
Start: 2025-04-15 | End: 2025-04-15

## 2025-04-15 RX ORDER — KETOROLAC TROMETHAMINE 30 MG/ML
INJECTION, SOLUTION INTRAMUSCULAR; INTRAVENOUS
Status: COMPLETED
Start: 2025-04-15 | End: 2025-04-15

## 2025-04-15 RX ORDER — ONDANSETRON 2 MG/ML
4 INJECTION INTRAMUSCULAR; INTRAVENOUS EVERY 6 HOURS PRN
Status: DISCONTINUED | OUTPATIENT
Start: 2025-04-15 | End: 2025-04-17

## 2025-04-15 RX ORDER — KETOROLAC TROMETHAMINE 30 MG/ML
15 INJECTION, SOLUTION INTRAMUSCULAR; INTRAVENOUS ONCE
Status: COMPLETED | OUTPATIENT
Start: 2025-04-15 | End: 2025-04-15

## 2025-04-15 RX ORDER — KETAMINE HYDROCHLORIDE 50 MG/ML
INJECTION, SOLUTION INTRAMUSCULAR; INTRAVENOUS AS NEEDED
Status: DISCONTINUED | OUTPATIENT
Start: 2025-04-15 | End: 2025-04-15 | Stop reason: SURG

## 2025-04-15 RX ORDER — LABETALOL HYDROCHLORIDE 5 MG/ML
INJECTION, SOLUTION INTRAVENOUS AS NEEDED
Status: DISCONTINUED | OUTPATIENT
Start: 2025-04-15 | End: 2025-04-15 | Stop reason: SURG

## 2025-04-15 RX ADMIN — SODIUM CHLORIDE, SODIUM LACTATE, POTASSIUM CHLORIDE, CALCIUM CHLORIDE: 600; 310; 30; 20 INJECTION, SOLUTION INTRAVENOUS at 06:03:00

## 2025-04-15 RX ADMIN — LABETALOL HYDROCHLORIDE 5 MG: 5 INJECTION, SOLUTION INTRAVENOUS at 06:55:00

## 2025-04-15 RX ADMIN — CAFFEINE AND SODIUM BENZOATE 750 MG: 125 INJECTION, SOLUTION INTRAMUSCULAR; INTRAVENOUS at 06:25:00

## 2025-04-15 RX ADMIN — ETOMIDATE 14 MG: 2 INJECTION INTRAVENOUS at 06:55:00

## 2025-04-15 RX ADMIN — KETAMINE HYDROCHLORIDE 25 MG: 50 INJECTION, SOLUTION INTRAMUSCULAR; INTRAVENOUS at 06:56:00

## 2025-04-15 RX ADMIN — ONDANSETRON 8 MG: 2 INJECTION INTRAMUSCULAR; INTRAVENOUS at 06:03:00

## 2025-04-15 RX ADMIN — KETOROLAC TROMETHAMINE 15 MG: 30 INJECTION, SOLUTION INTRAMUSCULAR; INTRAVENOUS at 06:08:00

## 2025-04-15 NOTE — DISCHARGE INSTRUCTIONS
Discharge Instructions  Electroconvulsive Therapy    Activities:  You MUST arrange to have a responsible adult drive you home and have a responsible adult stay with you the rest of the day and overnight.  Do not drive today.  Do not operate any machinery today. Use kitchen equipment with caution.  Rest and take it easy today.  Do not take public transportation without the presence of another responsible adult for 24 hours    Medications:  Resume your regular medications when you get home  The nurse will instruct you not to take any NSAIDS (Advil, Aleve, Motrin, Ibuprofen) before 1 pm today because you were given a certain medication during the procedure.      Diet:  You may resume your regular diet when you get home  Do not drink alcohol for 24 hours    Additional Instructions:  Someone should call you to schedule any upcoming ECT treatments. Call as needed to schedule or cancel your ECT appointments [phone].  If you have any questions or concerns, please call your own psychiatrist.  For your safety, please do not wear make-up to any future ECT appointments.  For any questions regarding your ECT appointment, please call [facility] [phone].  For cancellations after hours, call [phone] and leave a message.    Expected Recovery:  As you awaken, you may experience one or more of the following:  Headache, nausea, temporary confusion, or muscle stiffness.  If these symptoms increase, become severe or are accompanied by a fever of more than 101, please seek medical attention.  The ECT may affect memory.  Many patients report loss of memory for events that occurred in the days, weeks or months surrounding the ECT.  Many of these memories may return, but not always.  Short-term memory may also be affected for months, but this can also be a result of the disorder that you have.

## 2025-04-15 NOTE — ANESTHESIA PREPROCEDURE EVALUATION
PRE-OP EVALUATION    Patient Name: Robe Nunez    Admit Diagnosis: Severe recurrent major depression without psychotic features ([facility]) [F33.2]    Pre-op Diagnosis: * No pre-op diagnosis entered *        Anesthesia Procedure: ECT(BEDSIDE)    * No surgeons found in log *    Pre-op vitals reviewed.  Temp: 97.2 °F (36.2 °C)  Pulse: 67  Resp: 12  BP: 127/82  SpO2: 96 %  Body mass index is 27.62 kg/m².    Current medications reviewed.  Hospital Medications:  Current Medications[1]    Outpatient Medications:   Prescriptions Prior to Admission[2]    Allergies: Other and Seasonal      Anesthesia Evaluation    Patient summary reviewed.    Anesthetic Complications  (-) history of anesthetic complications         GI/Hepatic/Renal    Negative GI/hepatic/renal ROS.                             Cardiovascular      ECG reviewed.  Exercise tolerance: good     MET: >4      (+) hypertension                                     Endo/Other                                  Pulmonary    Negative pulmonary ROS.                       Neuro/Psych      (+) depression                                Past Surgical History[3]  Social Hx on file[4]  History   Drug Use No     Available pre-op labs reviewed.  Lab Results   Component Value Date    WBC 6.6 03/23/2025    RBC 4.41 03/23/2025    HGB 12.8 (L) 03/23/2025    HCT 36.4 (L) 03/23/2025    MCV 82.5 03/23/2025    MCH 29.0 03/23/2025    MCHC 35.2 03/23/2025    RDW 12.2 03/23/2025    .0 03/23/2025     Lab Results   Component Value Date     03/23/2025    K 3.9 03/23/2025     03/23/2025    CO2 25.0 03/23/2025    BUN 12 03/23/2025    CREATSERUM 0.97 03/23/2025     (H) 03/23/2025    CA 10.0 03/23/2025              Airway      Mallampati: I      Neck ROM: limited Cardiovascular    Cardiovascular exam normal.         Dental    Dentition appears grossly intact         Pulmonary    Pulmonary exam normal.                 Other findings              ASA: 2   Plan: general  NPO  status verified and patient meets guidelines.    Post-procedure pain management plan discussed with surgeon and patient.      Plan/risks discussed with: patient                Present on Admission:  **None**             [1]    lactated ringers infusion   Intravenous Continuous    [COMPLETED] ketorolac (Toradol) 30 MG/ML injection 15 mg  15 mg Intravenous Once    [COMPLETED] ondansetron (Zofran) 4 MG/2ML injection 8 mg  8 mg Intravenous Once    [COMPLETED] caffeine-sodium benzoate 125-125 mg/mL injection  750 mg Intravenous Once    [COMPLETED] ondansetron (Zofran) 4 MG/2ML injection       [2] (Not in a hospital admission)   [3]   Past Surgical History:  Procedure Laterality Date    Colonoscopy & polypectomy  05/09/2016    diverticulosis and a small polyp was removed; ASC    Colonoscopy,biopsy N/A 05/09/2016    Procedure: COLONOSCOPY, POSSIBLE BIOPSY, POSSIBLE POLYPECTOMY 64363;  Surgeon: Shaun Mock MD;  Location: [facility]    Ect provided Bilateral 12/20/2024    1st. Tx. 12/20/24    Ect(bedside)      Other surgical history      scope knee    Other surgical history  11/29/2024    Aquablation of the prostate    Patient documented not to have experienced any of the following events N/A 05/09/2016    Procedure: COLONOSCOPY, POSSIBLE BIOPSY, POSSIBLE POLYPECTOMY 87767;  Surgeon: Shaun Mock MD;  Location: [facility]    Patient withough preoperative order for iv antibiotic surgical site infection prophylaxis. N/A 05/09/2016    Procedure: COLONOSCOPY, POSSIBLE BIOPSY, POSSIBLE POLYPECTOMY 26379;  Surgeon: Shaun Mock MD;  Location: [facility]    Special service or report  1990s    R knee arthroscopy lat meniscus   [4]   Social History  Socioeconomic History    Marital status:    Tobacco Use    Smoking status: Never    Smokeless tobacco: Never   Vaping Use    Vaping status: Unknown   Substance and Sexual Activity    Alcohol use: No     Alcohol/week: 0.0 - 1.0  standard drinks of alcohol     Comment: rare    Drug use: No    Sexual activity: Yes

## 2025-04-15 NOTE — PROGRESS NOTES
[facility] / [facility]  ECT History & Physical    Robe Nunez Patient Status:  Outpatient   Age/Gender 74 year old male MRN [id number]   Location [facility] POST ANESTHESIA CARE UNIT Attending Bryn Taylor MD   Hosp Day # 0 PCP Mango Boland MD     Date of Service: 4/15/2025    Diagnosis:  Major Depression Recurrent Severe Without Psychotic Features. F33.2    Procedure:  Bitemporal    HPI: Improving in all areas.  No physical complaints      Medical History:  Past Medical History[1]    Surgical History:  Past Surgical History[2]    Family History:  Family History[3]    Social History:  Social History     Socioeconomic History    Marital status:      Spouse name: Not on file    Number of children: Not on file    Years of education: Not on file    Highest education level: Not on file   Occupational History    Not on file   Tobacco Use    Smoking status: Never    Smokeless tobacco: Never   Vaping Use    Vaping status: Unknown   Substance and Sexual Activity    Alcohol use: No     Alcohol/week: 0.0 - 1.0 standard drinks of alcohol     Comment: rare    Drug use: No    Sexual activity: Yes   Other Topics Concern    Not on file   Social History Narrative    marital status:     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[address]    caffeine: few cups    blood tranfusion: no    hiv exposure: no    travel: domestic    exercise: [employer] 2 x week    mammo:      dexa:      pap:      colonoscopy: 2006 ok    lab: 2012, 2013,2014    prostate: 2013,2014    testicles: 2013,2013    influenza: 2012,2013    bdt: yes    pneumovax: no    zoster: 2013                         Social Drivers of Health     Food Insecurity: No Food Insecurity (3/25/2025)    NCSS - Food Insecurity     Worried About Running Out of Food in the Last Year: No     Ran Out of Food in the Last Year: No   Transportation Needs: No Transportation Needs (3/25/2025)    NCSS - Transportation     Lack of Transportation: No   Housing Stability: Not At  Risk (3/25/2025)    NCSS - Housing/Utilities     Has Housing: Yes     Worried About Losing Housing: No     Unable to Get Utilities: No       ROS:  unremarkable    Physical Exam:   /82 (BP Location: Left arm)   Pulse 67   Temp 97.2 °F (36.2 °C) (Temporal)   Resp 12   Ht 67\"   Wt 80 kg (176 lb 5.9 oz)   SpO2 96%   BMI 27.62 kg/m²     General Appearance:    Alert, cooperative, no distress, appears stated age   Head:    Normocephalic, without obvious abnormality, atraumatic   Eyes:    PERRL, conjunctiva/corneas clear, EOM's intact   Nose:   Nares normal, septum midline, mucosa normal, no drainage    or sinus tenderness   Throat:   Lips, mucosa, and tongue normal; teeth and gums normal   Neck:   Supple, symmetrical, trachea midline   Lungs:     Clear to auscultation bilaterally, respirations unlabored    Heart:    Regular rate and rhythm, S1 and S2 normal, no murmur, rub or gallop   Abdomen:     Soft, non-tender, bowel sounds active all four quadrants,     no masses, no organomegaly   Extremities:   Extremities normal, atraumatic, no cyanosis or edema   Pulses:   2+ and symmetric all extremities   Skin:   Skin color, texture, turgor normal, no rashes or lesions   Neurologic:   CNII-XII intact, normal strength, sensation and reflexes     throughout     Impressions & Plans:    Diagnosis:  Major Depression Recurrent Severe Without Psychotic Features. F33.2    Procedure:  Bitemporal    I have discussed the risks and benefits and alternatives with the patient/family.  They understand and agree to proceed with plan of care.    Frances Sung MD  4/15/2025         [1]   Past Medical History:   Cancer (HCC)    Skin cancer    Depression    Headache disorder    High blood pressure    High cholesterol    HTN (hypertension)    Hyperlipidemia    Nonspecific elevation of levels of transaminase or lactic acid dehydrogenase (LDH)    s/p liver biopsy normal    Visual impairment    glasses   [2]   Past Surgical  History:  Procedure Laterality Date    Colonoscopy & polypectomy  05/09/2016    diverticulosis and a small polyp was removed; ASC    Colonoscopy,biopsy N/A 05/09/2016    Procedure: COLONOSCOPY, POSSIBLE BIOPSY, POSSIBLE POLYPECTOMY 02319;  Surgeon: Shaun Mock MD;  Location: [facility]    Ect provided Bilateral 12/20/2024    1st. Tx. 12/20/24    Ect(bedside)      Other surgical history      scope knee    Other surgical history  11/29/2024    Aquablation of the prostate    Patient documented not to have experienced any of the following events N/A 05/09/2016    Procedure: COLONOSCOPY, POSSIBLE BIOPSY, POSSIBLE POLYPECTOMY 26105;  Surgeon: Shaun Mock MD;  Location: [facility]    Patient withough preoperative order for iv antibiotic surgical site infection prophylaxis. N/A 05/09/2016    Procedure: COLONOSCOPY, POSSIBLE BIOPSY, POSSIBLE POLYPECTOMY 70972;  Surgeon: Shaun Mock MD;  Location: [facility]    Special service or report  1990s    R knee arthroscopy lat meniscus   [3]   Family History  Problem Relation Age of Onset    Hypertension Father     Diabetes Father     Lipids Brother     Cancer Brother         prostate ca    Other (Other[other]) Sister         ?sle

## 2025-04-15 NOTE — PROGRESS NOTES
[facility] / [facility]  ECT Procedure Note    Robe Nunez Patient Status:  Outpatient   Age/Gender 74 year old male   MRN [id number]    Location [facility] POST ANESTHESIA CARE UNIT Attending No att. providers found   Hosp Day # 0 PCP Mango Boland MD     ECT Number: #19 total-#7 in series    Diagnosis: Major Depression Recurrent Severe Without Psychotic Features. F33.2    Type of ECT:  Bitemporal    Place of Service:  Outpatient    Settings:   1.  Energy Percentage: 100%    2.  Program:  DGX    Pre-ECT Evaluation    Symptoms:      Prior to procedure, reviewed with treatment team correct patient, time of procedure and type of ECT.  Also reviewed with anesthesia pre-ECT medications    Pt is doing well but does not feel ready to cancel second treatment this week (feels a little apprehensive).  Mood is much less depressed.  He is sleeping better with Seroquel and is less preoccupied with his sleep.  Appetite is normal.  He is still not back to normal function but improving.  No physical cognitive side effects with ECT.  Will do second tx this week and reevalute for weekly at end of week.     The patient continues to retain capacity to consent for ECT.    Risk/Benefits:  Discussed with patient side effects of ECT including headache, teeth, jaw, cardiac, pulmonary, NPO, aspiration, allergic reactions, anesthetic reactions, musculoskeletal, neurologic, morbidity/mortality, potential lack of efficacy, unilateral/bilateral ECT, relapse/maintenance issues, cognitive risks including memory, concentration, cognition, and other risks.    Side Effects:  Patient notes no cognitive/physical complaints    Exam:  Mood: less depressed and less anxious  Affect: Congruent and brighter and more relaxed  Memory:  intact immediate, recent, remote and as evidenced by ability to present consistent history  Concentration:   focused and attentive and as assessed by  ability to concentrate on our conversation  Suicidal  ideation: no suicidal ideation    Patient Monitored:  B/P, EKG, EEG, Pulse Ox, Left Ankle Cuff    Pre-ECT Medications:  Toradol 15 mg IV, Hydralazine 10 mg IV 30 min prior to treatment, and Zofran 8 mg IV and caffeine 750 mg IV     ECT Medications:  Labetalol 5 mg IV, Anesthetic  Etomidate 14 mg IV followed by ketamine 25 mg IV, and Succinylcholine 80 mg IV    Seizure Duration:  Motor: 27 seconds       EE seconds    Post-ECT Condition:  Treatment unremarkable    ECT Medications:  None     Frances Sung    4/15/2025

## 2025-04-15 NOTE — ANESTHESIA POSTPROCEDURE EVALUATION
[facility]    Robe Nunez Patient Status:  Outpatient   Age/Gender 74 year old male MRN [id number]   Location [facility] POST ANESTHESIA CARE UNIT Attending Bryn Taylor MD   Hosp Day # 0 PCP Mango Boland MD       Anesthesia Post-op Note        Procedure Summary       Date: 04/15/25 Room / Location: [facility] Post Anesthesia Care Unit    Anesthesia Start: 0651 Anesthesia Stop: 0703    Procedure: ECT(BEDSIDE) Diagnosis: Severe recurrent major depression without psychotic features (HCC)    Scheduled Providers: Antonino Edwards MD Anesthesiologist: Cordell Hua MD    Anesthesia Type: general ASA Status: 2            Anesthesia Type: general    Vitals Value Taken Time   /81 04/15/25 07:03   Temp 98 °F (36.7 °C) 04/15/25 07:03   Pulse 73 04/15/25 07:03   Resp 16 04/15/25 07:03   SpO2 95 % 04/15/25 07:03           Patient Location: PACU    Anesthesia Type: general    Airway Patency: patent    Postop Pain Control: adequate    Mental Status: mildly sedated but able to meaningfully participate in the post-anesthesia evaluation    Nausea/Vomiting: none    Cardiopulmonary/Hydration status: stable euvolemic    Complications: no apparent anesthesia related complications    Postop vital signs: stable    Dental Exam: Unchanged from Preop    Patient to be discharged from PACU when criteria met.

## 2025-04-17 ENCOUNTER — ANESTHESIA EVENT (OUTPATIENT)
Dept: POSTOP/PACU | Facility: HOSPITAL | Age: 75
End: 2025-04-17
Payer: MEDICARE

## 2025-04-17 ENCOUNTER — ANESTHESIA (OUTPATIENT)
Dept: POSTOP/PACU | Facility: HOSPITAL | Age: 75
End: 2025-04-17
Payer: MEDICARE

## 2025-04-17 ENCOUNTER — HOSPITAL ENCOUNTER (OUTPATIENT)
Dept: POSTOP/PACU | Facility: HOSPITAL | Age: 75
Discharge: HOME | End: 2025-04-17
Attending: Other
Payer: MEDICARE

## 2025-04-17 VITALS
SYSTOLIC BLOOD PRESSURE: 153 MMHG | HEART RATE: 70 BPM | TEMPERATURE: 99 F | RESPIRATION RATE: 12 BRPM | DIASTOLIC BLOOD PRESSURE: 82 MMHG | HEIGHT: 67 IN | BODY MASS INDEX: 28 KG/M2 | OXYGEN SATURATION: 97 %

## 2025-04-17 DIAGNOSIS — F33.2 SEVERE RECURRENT MAJOR DEPRESSION WITHOUT PSYCHOTIC FEATURES (HCC): ICD-10-CM

## 2025-04-17 RX ORDER — HYDRALAZINE HYDROCHLORIDE 20 MG/ML
INJECTION INTRAMUSCULAR; INTRAVENOUS
Status: COMPLETED
Start: 2025-04-17 | End: 2025-04-17

## 2025-04-17 RX ORDER — CAFFEINE AND SODIUM BENZOATE 125 MG/ML
INJECTION, SOLUTION INTRAMUSCULAR; INTRAVENOUS
Status: COMPLETED
Start: 2025-04-17 | End: 2025-04-17

## 2025-04-17 RX ORDER — SODIUM CHLORIDE, SODIUM LACTATE, POTASSIUM CHLORIDE, CALCIUM CHLORIDE 600; 310; 30; 20 MG/100ML; MG/100ML; MG/100ML; MG/100ML
INJECTION, SOLUTION INTRAVENOUS CONTINUOUS
Status: DISCONTINUED | OUTPATIENT
Start: 2025-04-17 | End: 2025-04-19

## 2025-04-17 RX ORDER — KETOROLAC TROMETHAMINE 30 MG/ML
15 INJECTION, SOLUTION INTRAMUSCULAR; INTRAVENOUS ONCE
Status: COMPLETED | OUTPATIENT
Start: 2025-04-17 | End: 2025-04-17

## 2025-04-17 RX ORDER — LABETALOL HYDROCHLORIDE 5 MG/ML
INJECTION, SOLUTION INTRAVENOUS AS NEEDED
Status: DISCONTINUED | OUTPATIENT
Start: 2025-04-17 | End: 2025-04-17 | Stop reason: SURG

## 2025-04-17 RX ORDER — ONDANSETRON 2 MG/ML
4 INJECTION INTRAMUSCULAR; INTRAVENOUS EVERY 6 HOURS PRN
Status: DISCONTINUED | OUTPATIENT
Start: 2025-04-17 | End: 2025-04-19

## 2025-04-17 RX ORDER — ETOMIDATE 2 MG/ML
INJECTION INTRAVENOUS AS NEEDED
Status: DISCONTINUED | OUTPATIENT
Start: 2025-04-17 | End: 2025-04-17 | Stop reason: SURG

## 2025-04-17 RX ORDER — KETAMINE HYDROCHLORIDE 50 MG/ML
INJECTION, SOLUTION INTRAMUSCULAR; INTRAVENOUS AS NEEDED
Status: DISCONTINUED | OUTPATIENT
Start: 2025-04-17 | End: 2025-04-17 | Stop reason: SURG

## 2025-04-17 RX ORDER — KETOROLAC TROMETHAMINE 30 MG/ML
INJECTION, SOLUTION INTRAMUSCULAR; INTRAVENOUS
Status: COMPLETED
Start: 2025-04-17 | End: 2025-04-17

## 2025-04-17 RX ORDER — CAFFEINE AND SODIUM BENZOATE 125 MG/ML
750 INJECTION, SOLUTION INTRAMUSCULAR; INTRAVENOUS ONCE
Status: COMPLETED | OUTPATIENT
Start: 2025-04-17 | End: 2025-04-17

## 2025-04-17 RX ORDER — METOCLOPRAMIDE HYDROCHLORIDE 5 MG/ML
10 INJECTION INTRAMUSCULAR; INTRAVENOUS EVERY 8 HOURS PRN
Status: DISCONTINUED | OUTPATIENT
Start: 2025-04-17 | End: 2025-04-19

## 2025-04-17 RX ORDER — HYDRALAZINE HYDROCHLORIDE 20 MG/ML
10 INJECTION INTRAMUSCULAR; INTRAVENOUS ONCE
Status: COMPLETED | OUTPATIENT
Start: 2025-04-17 | End: 2025-04-17

## 2025-04-17 RX ORDER — NALOXONE HYDROCHLORIDE 0.4 MG/ML
0.08 INJECTION, SOLUTION INTRAMUSCULAR; INTRAVENOUS; SUBCUTANEOUS AS NEEDED
Status: ACTIVE | OUTPATIENT
Start: 2025-04-17 | End: 2025-04-17

## 2025-04-17 RX ORDER — ACETAMINOPHEN 500 MG
1000 TABLET ORAL ONCE AS NEEDED
Status: ACTIVE | OUTPATIENT
Start: 2025-04-17 | End: 2025-04-17

## 2025-04-17 RX ORDER — ONDANSETRON 2 MG/ML
INJECTION INTRAMUSCULAR; INTRAVENOUS
Status: COMPLETED
Start: 2025-04-17 | End: 2025-04-17

## 2025-04-17 RX ORDER — ONDANSETRON 2 MG/ML
8 INJECTION INTRAMUSCULAR; INTRAVENOUS ONCE
Status: COMPLETED | OUTPATIENT
Start: 2025-04-17 | End: 2025-04-17

## 2025-04-17 RX ADMIN — SODIUM CHLORIDE, SODIUM LACTATE, POTASSIUM CHLORIDE, CALCIUM CHLORIDE: 600; 310; 30; 20 INJECTION, SOLUTION INTRAVENOUS at 06:22:00

## 2025-04-17 RX ADMIN — ETOMIDATE 14 MG: 2 INJECTION INTRAVENOUS at 06:59:00

## 2025-04-17 RX ADMIN — ONDANSETRON 8 MG: 2 INJECTION INTRAMUSCULAR; INTRAVENOUS at 06:23:00

## 2025-04-17 RX ADMIN — KETAMINE HYDROCHLORIDE 25 MG: 50 INJECTION, SOLUTION INTRAMUSCULAR; INTRAVENOUS at 06:59:00

## 2025-04-17 RX ADMIN — KETOROLAC TROMETHAMINE 15 MG: 30 INJECTION, SOLUTION INTRAMUSCULAR; INTRAVENOUS at 06:23:00

## 2025-04-17 RX ADMIN — SODIUM CHLORIDE, SODIUM LACTATE, POTASSIUM CHLORIDE, CALCIUM CHLORIDE: 600; 310; 30; 20 INJECTION, SOLUTION INTRAVENOUS at 06:57:00

## 2025-04-17 RX ADMIN — HYDRALAZINE HYDROCHLORIDE 10 MG: 20 INJECTION INTRAMUSCULAR; INTRAVENOUS at 06:23:00

## 2025-04-17 RX ADMIN — CAFFEINE AND SODIUM BENZOATE 750 MG: 125 INJECTION, SOLUTION INTRAMUSCULAR; INTRAVENOUS at 06:24:00

## 2025-04-17 RX ADMIN — LABETALOL HYDROCHLORIDE 5 MG: 5 INJECTION, SOLUTION INTRAVENOUS at 06:58:00

## 2025-04-17 NOTE — PROGRESS NOTES
[facility] / [facility]  ECT Procedure Note    Robe Nunez Patient Status:  Outpatient   Age/Gender 74 year old male MRN [id number]   Location [facility] POST ANESTHESIA CARE UNIT Attending Bryn Taylor MD   Hosp Day # 0 PCP Mango Boland MD     4/17/2025    ECT Number: #20 total.  #8 in series    Diagnosis: Major Depression Recurrent Severe Without Psychotic Features. F33.2    Type of ECT:  Bitemporal    Place of Service:  Outpatient    Settings:   1.  Energy Percentage: 100%    2.  Program:  DGX    Pre-ECT Evaluation    Symptoms:  Patient seen.  Overall patient noted positive signs of improving mood, anxiety, and obsessional thinking.  Patient feels Lesia.  Patient noted no cognitive or physical complaints.  Patient noted to have no pato or psychosis.  No suicidal thoughts.  Patient shows capacity make decisions.  Discussed risks and benefits.  At this point plan on changing to 1 ECT next week and reevaluate.    Risk/Benefits:  Discussed with patient side effects of ECT including headache, teeth, jaw, cardiac, pulmonary, NPO, aspiration, allergic reactions, anesthetic reactions, musculoskeletal, neurologic, morbidity/mortality, potential lack of efficacy, unilateral/bilateral ECT, relapse/maintenance issues, cognitive risks including memory, concentration, cognition, and other risks.    Side Effects:  Patient notes no cognitive/physical complaints    Exam:  Mood: less depressed and less anxious  Affect: Congruent  Memory:  intact immediate, recent, remote and as evidenced by ability to present consistent history  Concentration:   good  Suicidal ideation: no suicidal ideation    Prior to procedure, reviewed with treatment team correct patient, time of procedure and type of ECT.  Also reviewed with anesthesia pre-ECT medications.    Patient Monitored:  B/P, EKG, EEG, Pulse Ox, Left Ankle Cuff    Pre-ECT Medications: Toradol 15 mg IV, Hydralazine 10 mg IV 30 min prior to treatment, and  Zofran 8 mg IV caffeine 750 mg IV    ECT Medications:  Labetalol 5 mg IV, Anesthetic  Etomidate 14 mg IV followed by ketamine 25 mg IV, and Succinylcholine 80 mg IV    Seizure Duration:  Motor: 29 seconds       EE seconds    Post-ECT Condition:  Treatment unremarkable    Post-ECT Medications:  None     Bryn Taylor MD

## 2025-04-17 NOTE — PROGRESS NOTES
[facility] / [facility]  ECT History & Physical    Robe Nunez Patient Status:  Outpatient   Age/Gender 74 year old male MRN [id number]   Location [facility] POST ANESTHESIA CARE UNIT Attending Bryn Taylor MD   Hosp Day # 0 PCP Mango Boland MD     Date: 4/17/2025    Diagnosis: Major depression recurrent severe    Procedure:  ECT    HPI:     Patient seen.  Patient reports no cognitive or physical complaints      Medical History:  Past Medical History[1]    Surgical History:  Past Surgical History[2]    Family History:  Family History[3]    ROS:  Unremarkable    Physical Exam:   /84   Pulse 73   Temp 99 °F (37.2 °C) (Temporal)   Resp 13   Ht 67\"   SpO2 97%   BMI 27.62 kg/m²     General Appearance:    Alert, cooperative, no distress, appears stated age   Head:    Normocephalic, without obvious abnormality, atraumatic   Eyes:    PERRL, conjunctiva/corneas clear, EOM's intact       Nose:   Nares normal, septum midline, mucosa normal, no drainage    or sinus tenderness   Throat:   Lips, mucosa, and tongue normal; teeth and gums normal   Neck:   Supple, symmetrical, trachea midline   Lungs:     Clear to auscultation bilaterally, respirations unlabored    Heart:    Regular rate and rhythm, S1 and S2 normal, no murmur, rub   or gallop   Abdomen:     Soft, non-tender, bowel sounds active all four quadrants,     no masses, no organomegaly   Extremities:   Extremities normal, atraumatic, no cyanosis or edema   Pulses:   2+ and symmetric all extremities   Skin:   Skin color, texture, turgor normal, no rashes or lesions   Neurologic:   CNII-XII intact, normal strength, sensation and reflexes     throughout     Impressions & Plans: Major depression recurrent severe.  Bitemporal ECT    I have discussed the risks and benefits and alternatives with the patient/family.  They understand and agree to proceed with plan of care.    Bryn Taylor MD                   [1]   Past Medical History:   Cancer (HCC)     Skin cancer    Depression    Headache disorder    High blood pressure    High cholesterol    HTN (hypertension)    Hyperlipidemia    Nonspecific elevation of levels of transaminase or lactic acid dehydrogenase (LDH)    s/p liver biopsy normal    Visual impairment    glasses   [2]   Past Surgical History:  Procedure Laterality Date    Colonoscopy & polypectomy  05/09/2016    diverticulosis and a small polyp was removed; ASC    Colonoscopy,biopsy N/A 05/09/2016    Procedure: COLONOSCOPY, POSSIBLE BIOPSY, POSSIBLE POLYPECTOMY 55280;  Surgeon: Shaun Mock MD;  Location: [facility]    Ect provided Bilateral 12/20/2024    1st. Tx. 12/20/24    Ect(bedside)      Other surgical history      scope knee    Other surgical history  11/29/2024    Aquablation of the prostate    Patient documented not to have experienced any of the following events N/A 05/09/2016    Procedure: COLONOSCOPY, POSSIBLE BIOPSY, POSSIBLE POLYPECTOMY 70019;  Surgeon: Shaun Mock MD;  Location: [facility]    Patient withough preoperative order for iv antibiotic surgical site infection prophylaxis. N/A 05/09/2016    Procedure: COLONOSCOPY, POSSIBLE BIOPSY, POSSIBLE POLYPECTOMY 31444;  Surgeon: Shaun Mock MD;  Location: [facility]    Special service or report  1990s    R knee arthroscopy lat meniscus   [3]   Family History  Problem Relation Age of Onset    Hypertension Father     Diabetes Father     Lipids Brother     Cancer Brother         prostate ca    Other (Other[other]) Sister         ?sle

## 2025-04-17 NOTE — ANESTHESIA POSTPROCEDURE EVALUATION
-[facility]    Robe Nunez Patient Status:  Outpatient   Age/Gender 74 year old male MRN [id number]   Location [facility] POST ANESTHESIA CARE UNIT Attending Bryn Taylor MD   Hosp Day # 0 PCP Mango Boland MD       Anesthesia Post-op Note        Procedure Summary       Date: 04/17/25 Room / Location: [facility] Post Anesthesia Care Unit    Anesthesia Start: 0657 Anesthesia Stop: 0709    Procedure: ECT(BEDSIDE) Diagnosis: Severe recurrent major depression without psychotic features (HCC)    Scheduled Providers:  Anesthesiologist: Bar Patel DO    Anesthesia Type: general ASA Status: 2            Anesthesia Type: general    Vitals Value Taken Time   /92 04/17/25 07:10   Temp  04/17/25 07:10   Pulse 70 04/17/25 07:10   Resp 16 04/17/25 07:10   SpO2 100 04/17/25 07:10           Patient Location: PACU    Anesthesia Type: general    Airway Patency: patent    Postop Pain Control: adequate    Mental Status: preanesthetic baseline    Nausea/Vomiting: none    Cardiopulmonary/Hydration status: stable euvolemic    Complications: no apparent anesthesia related complications    Postop vital signs: stable    Dental Exam: Unchanged from Preop    Patient to be discharged from PACU when criteria met.           Alert and oriented to person, place and time, memory intact, behavior appropriate to situation, PERRL.

## 2025-04-17 NOTE — ANESTHESIA PREPROCEDURE EVALUATION
PRE-OP EVALUATION    Patient Name: Robe Nunez    Admit Diagnosis: Severe recurrent major depression without psychotic features (Formerly [facility]) [F33.2]    Pre-op Diagnosis: * No pre-op diagnosis entered *        Anesthesia Procedure: ECT(BEDSIDE)    * No surgeons found in log *    Pre-op vitals reviewed.        Body mass index is 27.62 kg/m².    Current medications reviewed.  Hospital Medications:  Current Medications[1]    Outpatient Medications:   Prescriptions Prior to Admission[2]    Allergies: Other and Seasonal      Anesthesia Evaluation    Patient summary reviewed.    Anesthetic Complications  (-) history of anesthetic complications         GI/Hepatic/Renal    Negative GI/hepatic/renal ROS.                             Cardiovascular      ECG reviewed.  Exercise tolerance: good     MET: >4      (+) hypertension                                     Endo/Other                                  Pulmonary    Negative pulmonary ROS.                       Neuro/Psych      (+) depression                                Past Surgical History[3]  Social Hx on file[4]  History   Drug Use No     Available pre-op labs reviewed.  Lab Results   Component Value Date    WBC 6.6 03/23/2025    RBC 4.41 03/23/2025    HGB 12.8 (L) 03/23/2025    HCT 36.4 (L) 03/23/2025    MCV 82.5 03/23/2025    MCH 29.0 03/23/2025    MCHC 35.2 03/23/2025    RDW 12.2 03/23/2025    .0 03/23/2025     Lab Results   Component Value Date     03/23/2025    K 3.9 03/23/2025     03/23/2025    CO2 25.0 03/23/2025    BUN 12 03/23/2025    CREATSERUM 0.97 03/23/2025     (H) 03/23/2025    CA 10.0 03/23/2025              Airway      Mallampati: I      Neck ROM: limited Cardiovascular    Cardiovascular exam normal.         Dental    Dentition appears grossly intact         Pulmonary    Pulmonary exam normal.                 Other findings              ASA: 2   Plan: general  NPO status verified and patient meets guidelines.    Post-procedure pain  management plan discussed with surgeon and patient.      Plan/risks discussed with: patient                Present on Admission:  **None**             [1]    lactated ringers infusion   Intravenous Continuous    [COMPLETED] ketorolac (Toradol) 30 MG/ML injection 15 mg  15 mg Intravenous Once    [COMPLETED] ondansetron (Zofran) 4 MG/2ML injection 8 mg  8 mg Intravenous Once    [COMPLETED] caffeine-sodium benzoate 125-125 mg/mL injection  750 mg Intravenous Once    [COMPLETED] hydrALAzine (Apresoline) 20 mg/mL injection 10 mg  10 mg Intravenous Once    [COMPLETED] ondansetron (Zofran) 4 MG/2ML injection        [COMPLETED] ketorolac (Toradol) 30 MG/ML injection        [COMPLETED] caffeine-sodium benzoate 125-125 MG/ML injection        [COMPLETED] hydrALAzine (Apresoline) 20 mg/mL injection       [2] (Not in a hospital admission)   [3]   Past Surgical History:  Procedure Laterality Date    Colonoscopy & polypectomy  05/09/2016    diverticulosis and a small polyp was removed; ASC    Colonoscopy,biopsy N/A 05/09/2016    Procedure: COLONOSCOPY, POSSIBLE BIOPSY, POSSIBLE POLYPECTOMY 15047;  Surgeon: Shaun Mock MD;  Location: [facility]    Ect provided Bilateral 12/20/2024    1st. Tx. 12/20/24    Ect(bedside)      Other surgical history      scope knee    Other surgical history  11/29/2024    Aquablation of the prostate    Patient documented not to have experienced any of the following events N/A 05/09/2016    Procedure: COLONOSCOPY, POSSIBLE BIOPSY, POSSIBLE POLYPECTOMY 26722;  Surgeon: Shaun Mock MD;  Location: [facility]    Patient withough preoperative order for iv antibiotic surgical site infection prophylaxis. N/A 05/09/2016    Procedure: COLONOSCOPY, POSSIBLE BIOPSY, POSSIBLE POLYPECTOMY 25446;  Surgeon: Shaun Mock MD;  Location: [facility]    Special service or report  1990s    R knee arthroscopy lat meniscus   [4]   Social History  Socioeconomic History     Marital status:    Tobacco Use    Smoking status: Never    Smokeless tobacco: Never   Vaping Use    Vaping status: Unknown   Substance and Sexual Activity    Alcohol use: No     Alcohol/week: 0.0 - 1.0 standard drinks of alcohol     Comment: rare    Drug use: No    Sexual activity: Yes

## 2025-04-17 NOTE — DISCHARGE INSTRUCTIONS
Discharge Instructions  Electroconvulsive Therapy    Activities:  You MUST arrange to have a responsible adult drive you home and have a responsible adult stay with you the rest of the day and overnight.  Do not drive today.  Do not operate any machinery today. Use kitchen equipment with caution.  Rest and take it easy today.  Do not take public transportation without the presence of another responsible adult for 24 hours    Medications:  Resume your regular medications when you get home  The nurse will instruct you not to take any NSAIDS (Advil, Aleve, Motrin, Ibuprofen) before 1 pm today because you were given a certain medication during the procedure.      Diet:  You may resume your regular diet when you get home  Do not drink alcohol for 24 hours    Additional Instructions:  Someone should call you to schedule any upcoming ECT treatments. Call as needed to schedule or cancel your ECT appointments [phone].  If you have any questions or concerns, please call your own psychiatrist.  For your safety, please do not wear make-up to any future ECT appointments.  For any questions regarding your ECT appointment, please call [facility] [phone].  For cancellations after hours, call [phone] and leave a message.    Expected Recovery:  As you awaken, you may experience one or more of the following:  Headache, nausea, temporary confusion, or muscle stiffness.  If these symptoms increase, become severe or are accompanied by a fever of more than 101, please seek medical attention.  The ECT may affect memory.  Many patients report loss of memory for events that occurred in the days, weeks or months surrounding the ECT.  Many of these memories may return, but not always.  Short-term memory may also be affected for months, but this can also be a result of the disorder that you have.

## 2025-04-22 ENCOUNTER — ANESTHESIA (OUTPATIENT)
Dept: POSTOP/PACU | Facility: HOSPITAL | Age: 75
End: 2025-04-22
Payer: MEDICARE

## 2025-04-22 ENCOUNTER — HOSPITAL ENCOUNTER (OUTPATIENT)
Dept: POSTOP/PACU | Facility: HOSPITAL | Age: 75
Discharge: HOME | End: 2025-04-22
Attending: Other
Payer: MEDICARE

## 2025-04-22 ENCOUNTER — ANESTHESIA EVENT (OUTPATIENT)
Dept: POSTOP/PACU | Facility: HOSPITAL | Age: 75
End: 2025-04-22
Payer: MEDICARE

## 2025-04-22 VITALS
SYSTOLIC BLOOD PRESSURE: 135 MMHG | TEMPERATURE: 98 F | DIASTOLIC BLOOD PRESSURE: 82 MMHG | RESPIRATION RATE: 13 BRPM | BODY MASS INDEX: 28 KG/M2 | OXYGEN SATURATION: 99 % | HEIGHT: 67 IN | HEART RATE: 74 BPM

## 2025-04-22 DIAGNOSIS — F33.2 SEVERE RECURRENT MAJOR DEPRESSION WITHOUT PSYCHOTIC FEATURES (HCC): ICD-10-CM

## 2025-04-22 RX ORDER — HYDROMORPHONE HYDROCHLORIDE 1 MG/ML
0.2 INJECTION, SOLUTION INTRAMUSCULAR; INTRAVENOUS; SUBCUTANEOUS EVERY 5 MIN PRN
Status: ACTIVE | OUTPATIENT
Start: 2025-04-22 | End: 2025-04-22

## 2025-04-22 RX ORDER — CAFFEINE AND SODIUM BENZOATE 125 MG/ML
750 INJECTION, SOLUTION INTRAMUSCULAR; INTRAVENOUS ONCE
Status: COMPLETED | OUTPATIENT
Start: 2025-04-22 | End: 2025-04-22

## 2025-04-22 RX ORDER — ETOMIDATE 2 MG/ML
INJECTION INTRAVENOUS AS NEEDED
Status: DISCONTINUED | OUTPATIENT
Start: 2025-04-22 | End: 2025-04-22 | Stop reason: SURG

## 2025-04-22 RX ORDER — SODIUM CHLORIDE, SODIUM LACTATE, POTASSIUM CHLORIDE, CALCIUM CHLORIDE 600; 310; 30; 20 MG/100ML; MG/100ML; MG/100ML; MG/100ML
INJECTION, SOLUTION INTRAVENOUS CONTINUOUS
Status: DISCONTINUED | OUTPATIENT
Start: 2025-04-22 | End: 2025-04-24

## 2025-04-22 RX ORDER — LABETALOL HYDROCHLORIDE 5 MG/ML
INJECTION, SOLUTION INTRAVENOUS AS NEEDED
Status: DISCONTINUED | OUTPATIENT
Start: 2025-04-22 | End: 2025-04-22 | Stop reason: SURG

## 2025-04-22 RX ORDER — NALOXONE HYDROCHLORIDE 0.4 MG/ML
0.08 INJECTION, SOLUTION INTRAMUSCULAR; INTRAVENOUS; SUBCUTANEOUS AS NEEDED
Status: ACTIVE | OUTPATIENT
Start: 2025-04-22 | End: 2025-04-22

## 2025-04-22 RX ORDER — CAFFEINE AND SODIUM BENZOATE 125 MG/ML
INJECTION, SOLUTION INTRAMUSCULAR; INTRAVENOUS
Status: COMPLETED
Start: 2025-04-22 | End: 2025-04-22

## 2025-04-22 RX ORDER — ONDANSETRON 2 MG/ML
4 INJECTION INTRAMUSCULAR; INTRAVENOUS ONCE
Status: DISCONTINUED | OUTPATIENT
Start: 2025-04-22 | End: 2025-04-22

## 2025-04-22 RX ORDER — KETOROLAC TROMETHAMINE 30 MG/ML
INJECTION, SOLUTION INTRAMUSCULAR; INTRAVENOUS
Status: COMPLETED
Start: 2025-04-22 | End: 2025-04-22

## 2025-04-22 RX ORDER — KETOROLAC TROMETHAMINE 30 MG/ML
15 INJECTION, SOLUTION INTRAMUSCULAR; INTRAVENOUS ONCE
Status: COMPLETED | OUTPATIENT
Start: 2025-04-22 | End: 2025-04-22

## 2025-04-22 RX ORDER — HYDROMORPHONE HYDROCHLORIDE 1 MG/ML
0.6 INJECTION, SOLUTION INTRAMUSCULAR; INTRAVENOUS; SUBCUTANEOUS EVERY 5 MIN PRN
Status: ACTIVE | OUTPATIENT
Start: 2025-04-22 | End: 2025-04-22

## 2025-04-22 RX ORDER — KETAMINE HYDROCHLORIDE 50 MG/ML
INJECTION, SOLUTION INTRAMUSCULAR; INTRAVENOUS AS NEEDED
Status: DISCONTINUED | OUTPATIENT
Start: 2025-04-22 | End: 2025-04-22 | Stop reason: SURG

## 2025-04-22 RX ORDER — ONDANSETRON 2 MG/ML
8 INJECTION INTRAMUSCULAR; INTRAVENOUS ONCE
Status: COMPLETED | OUTPATIENT
Start: 2025-04-22 | End: 2025-04-22

## 2025-04-22 RX ORDER — KETAMINE HYDROCHLORIDE 50 MG/ML
INJECTION, SOLUTION INTRAMUSCULAR; INTRAVENOUS
Status: COMPLETED
Start: 2025-04-22 | End: 2025-04-22

## 2025-04-22 RX ORDER — ONDANSETRON 2 MG/ML
INJECTION INTRAMUSCULAR; INTRAVENOUS
Status: COMPLETED
Start: 2025-04-22 | End: 2025-04-22

## 2025-04-22 RX ORDER — HYDROMORPHONE HYDROCHLORIDE 1 MG/ML
0.4 INJECTION, SOLUTION INTRAMUSCULAR; INTRAVENOUS; SUBCUTANEOUS EVERY 5 MIN PRN
Status: ACTIVE | OUTPATIENT
Start: 2025-04-22 | End: 2025-04-22

## 2025-04-22 RX ORDER — HYDRALAZINE HYDROCHLORIDE 20 MG/ML
10 INJECTION INTRAMUSCULAR; INTRAVENOUS ONCE
Status: COMPLETED | OUTPATIENT
Start: 2025-04-22 | End: 2025-04-22

## 2025-04-22 RX ORDER — HYDRALAZINE HYDROCHLORIDE 20 MG/ML
INJECTION INTRAMUSCULAR; INTRAVENOUS
Status: COMPLETED
Start: 2025-04-22 | End: 2025-04-22

## 2025-04-22 RX ADMIN — HYDRALAZINE HYDROCHLORIDE 10 MG: 20 INJECTION INTRAMUSCULAR; INTRAVENOUS at 06:04:00

## 2025-04-22 RX ADMIN — ONDANSETRON 8 MG: 2 INJECTION INTRAMUSCULAR; INTRAVENOUS at 05:57:00

## 2025-04-22 RX ADMIN — SODIUM CHLORIDE, SODIUM LACTATE, POTASSIUM CHLORIDE, CALCIUM CHLORIDE: 600; 310; 30; 20 INJECTION, SOLUTION INTRAVENOUS at 06:44:00

## 2025-04-22 RX ADMIN — SODIUM CHLORIDE, SODIUM LACTATE, POTASSIUM CHLORIDE, CALCIUM CHLORIDE: 600; 310; 30; 20 INJECTION, SOLUTION INTRAVENOUS at 05:55:00

## 2025-04-22 RX ADMIN — SODIUM CHLORIDE, SODIUM LACTATE, POTASSIUM CHLORIDE, CALCIUM CHLORIDE: 600; 310; 30; 20 INJECTION, SOLUTION INTRAVENOUS at 07:02:00

## 2025-04-22 RX ADMIN — ETOMIDATE 14 MG: 2 INJECTION INTRAVENOUS at 06:53:00

## 2025-04-22 RX ADMIN — KETOROLAC TROMETHAMINE 15 MG: 30 INJECTION, SOLUTION INTRAMUSCULAR; INTRAVENOUS at 05:55:00

## 2025-04-22 RX ADMIN — KETAMINE HYDROCHLORIDE 25 MG: 50 INJECTION, SOLUTION INTRAMUSCULAR; INTRAVENOUS at 06:53:00

## 2025-04-22 RX ADMIN — CAFFEINE AND SODIUM BENZOATE 750 MG: 125 INJECTION, SOLUTION INTRAMUSCULAR; INTRAVENOUS at 06:04:00

## 2025-04-22 RX ADMIN — LABETALOL HYDROCHLORIDE 5 MG: 5 INJECTION, SOLUTION INTRAVENOUS at 06:53:00

## 2025-04-22 NOTE — ANESTHESIA POSTPROCEDURE EVALUATION
[facility]    Robe Nunez Patient Status:  Outpatient   Age/Gender 74 year old male MRN [id number]   Location [facility] POST ANESTHESIA CARE UNIT Attending Bryn Taylor MD   Hosp Day # 0 PCP Mango Boland MD       Anesthesia Post-op Note        Procedure Summary       Date: 04/22/25 Room / Location: [facility] Post Anesthesia Care Unit    Anesthesia Start: 0642 Anesthesia Stop: 0703    Procedure: ECT(BEDSIDE) Diagnosis: Severe recurrent major depression without psychotic features (HCC)    Scheduled Providers:  Anesthesiologist: Wiliam Rocha MD    Anesthesia Type: general ASA Status: 2            Anesthesia Type: general    Vitals Value Taken Time   /87 04/22/25 07:03   Temp 98.3 °F (36.8 °C) 04/22/25 07:03   Pulse 76 04/22/25 07:03   Resp 16 04/22/25 07:03   SpO2 96 % 04/22/25 07:03           Patient Location: PACU    Anesthesia Type: general    Airway Patency: patent    Postop Pain Control: adequate    Mental Status: mildly sedated but able to meaningfully participate in the post-anesthesia evaluation    Nausea/Vomiting: none    Cardiopulmonary/Hydration status: stable euvolemic    Complications: no apparent anesthesia related complications    Postop vital signs: stable    Dental Exam: Unchanged from Preop    Patient to be discharged from PACU when criteria met.

## 2025-04-22 NOTE — PROGRESS NOTES
[facility]  ECT History & Physical    Robe Nunez Patient Status:  Outpatient   Age/Gender 74 year old male MRN [id number]   Location [facility] POST ANESTHESIA CARE UNIT Attending Bryn Taylor MD   Hosp Day # 0 PCP Mango Boland MD     Date of Service: 4/22/2025    Diagnosis:  Major Depression Recurrent Severe Without Psychotic Features. F33.2    Procedure:  Bitemporal    HPI: Patient reports improved mood and anxiety.  No physical complaints      Medical History:  Past Medical History[1]    Surgical History:  Past Surgical History[2]    Family History:  Family History[3]    Social History:  Social History     Socioeconomic History    Marital status:      Spouse name: Not on file    Number of children: Not on file    Years of education: Not on file    Highest education level: Not on file   Occupational History    Not on file   Tobacco Use    Smoking status: Never    Smokeless tobacco: Never   Vaping Use    Vaping status: Unknown   Substance and Sexual Activity    Alcohol use: No     Alcohol/week: 0.0 - 1.0 standard drinks of alcohol     Comment: rare    Drug use: No    Sexual activity: Yes   Other Topics Concern    Not on file   Social History Narrative    marital status:     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[address]    caffeine: few cups    blood tranfusion: no    hiv exposure: no    travel: domestic    exercise: health club 2 x week    mammo:      dexa:      pap:      colonoscopy: 2006 ok    lab: 2012, 2013,2014    prostate: 2013,2014    testicles: 2013,2013    influenza: 2012,2013    bdt: yes    pneumovax: no    zoster: 2013                         Social Drivers of Health     Food Insecurity: No Food Insecurity (3/25/2025)    NCSS - Food Insecurity     Worried About Running Out of Food in the Last Year: No     Ran Out of Food in the Last Year: No   Transportation Needs: No Transportation Needs (3/25/2025)    NCSS - Transportation     Lack of Transportation: No   Housing  Stability: Not At Risk (3/25/2025)    NCSS - Housing/Utilities     Has Housing: Yes     Worried About Losing Housing: No     Unable to Get Utilities: No       ROS:  unremarkable    Physical Exam:   /76 (BP Location: Right arm)   Pulse 70   Temp 97.1 °F (36.2 °C) (Temporal)   Resp 14   Ht 67\"   SpO2 98%   BMI 27.62 kg/m²     General Appearance:    Alert, cooperative, no distress, appears stated age   Head:    Normocephalic, without obvious abnormality, atraumatic   Eyes:    PERRL, conjunctiva/corneas clear, EOM's intact   Nose:   Nares normal, septum midline, mucosa normal, no drainage    or sinus tenderness   Throat:   Lips, mucosa, and tongue normal; teeth and gums normal   Neck:   Supple, symmetrical, trachea midline   Lungs:     Clear to auscultation bilaterally, respirations unlabored    Heart:    Regular rate and rhythm, S1 and S2 normal, no murmur, rub or gallop   Abdomen:     Soft, non-tender, bowel sounds active all four quadrants,     no masses, no organomegaly   Extremities:   Extremities normal, atraumatic, no cyanosis or edema   Pulses:   2+ and symmetric all extremities   Skin:   Skin color, texture, turgor normal, no rashes or lesions   Neurologic:   CNII-XII intact, normal strength, sensation and reflexes     throughout     Impressions & Plans:    Diagnosis:  Major Depression Recurrent Severe Without Psychotic Features. F33.2    Procedure:  Bitemporal    I have discussed the risks and benefits and alternatives with the patient/family.  They understand and agree to proceed with plan of care.    Frances Sung MD  4/22/2025         [1]   Past Medical History:   Cancer (HCC)    Skin cancer    Depression    Headache disorder    High blood pressure    High cholesterol    HTN (hypertension)    Hyperlipidemia    Nonspecific elevation of levels of transaminase or lactic acid dehydrogenase (LDH)    s/p liver biopsy normal    Visual impairment    glasses   [2]   Past Surgical History:  Procedure  Laterality Date    Colonoscopy & polypectomy  05/09/2016    diverticulosis and a small polyp was removed; ASC    Colonoscopy,biopsy N/A 05/09/2016    Procedure: COLONOSCOPY, POSSIBLE BIOPSY, POSSIBLE POLYPECTOMY 99645;  Surgeon: Shaun Mock MD;  Location: [facility]    Ect provided Bilateral 12/20/2024    1st. Tx. 12/20/24    Ect(bedside)      Other surgical history      scope knee    Other surgical history  11/29/2024    Aquablation of the prostate    Patient documented not to have experienced any of the following events N/A 05/09/2016    Procedure: COLONOSCOPY, POSSIBLE BIOPSY, POSSIBLE POLYPECTOMY 54065;  Surgeon: Shaun Mock MD;  Location: [facility]    Patient withough preoperative order for iv antibiotic surgical site infection prophylaxis. N/A 05/09/2016    Procedure: COLONOSCOPY, POSSIBLE BIOPSY, POSSIBLE POLYPECTOMY 27826;  Surgeon: Shaun Mock MD;  Location: [facility]    Special service or report  1990s    R knee arthroscopy lat meniscus   [3]   Family History  Problem Relation Age of Onset    Hypertension Father     Diabetes Father     Lipids Brother     Cancer Brother         prostate ca    Other (Other[other]) Sister         ?sle

## 2025-04-22 NOTE — ANESTHESIA PREPROCEDURE EVALUATION
PRE-OP EVALUATION    Patient Name: Robe Nunez    Admit Diagnosis: Severe recurrent major depression without psychotic features ([facility]) [F33.2]    Pre-op Diagnosis: * No pre-op diagnosis entered *        Anesthesia Procedure: ECT(BEDSIDE)    * No surgeons found in log *    Pre-op vitals reviewed.  Temp: 97.1 °F (36.2 °C)  Pulse: 70  Resp: 14  BP: 124/76  SpO2: 98 %  Body mass index is 27.62 kg/m².    Current medications reviewed.  Hospital Medications:  Current Medications[1]    Outpatient Medications:   Prescriptions Prior to Admission[2]    Allergies: Other and Seasonal      Anesthesia Evaluation    Patient summary reviewed.    Anesthetic Complications  (-) history of anesthetic complications         GI/Hepatic/Renal    Negative GI/hepatic/renal ROS.                             Cardiovascular      ECG reviewed.  Exercise tolerance: good     MET: >4      (+) hypertension                                     Endo/Other                                  Pulmonary    Negative pulmonary ROS.                       Neuro/Psych      (+) depression                                Past Surgical History[3]  Social Hx on file[4]  History   Drug Use No     Available pre-op labs reviewed.  Lab Results   Component Value Date    WBC 6.6 03/23/2025    RBC 4.41 03/23/2025    HGB 12.8 (L) 03/23/2025    HCT 36.4 (L) 03/23/2025    MCV 82.5 03/23/2025    MCH 29.0 03/23/2025    MCHC 35.2 03/23/2025    RDW 12.2 03/23/2025    .0 03/23/2025     Lab Results   Component Value Date     03/23/2025    K 3.9 03/23/2025     03/23/2025    CO2 25.0 03/23/2025    BUN 12 03/23/2025    CREATSERUM 0.97 03/23/2025     (H) 03/23/2025    CA 10.0 03/23/2025              Airway      Mallampati: I      Neck ROM: limited Cardiovascular    Cardiovascular exam normal.         Dental    Dentition appears grossly intact         Pulmonary    Pulmonary exam normal.                 Other findings              ASA: 2   Plan: general  NPO  status verified and patient meets guidelines.    Post-procedure pain management plan discussed with surgeon and patient.      Plan/risks discussed with: patient                Present on Admission:  **None**             [1]    lactated ringers infusion   Intravenous Continuous    [COMPLETED] ketorolac (Toradol) 30 MG/ML injection 15 mg  15 mg Intravenous Once    [COMPLETED] caffeine-sodium benzoate 125-125 mg/mL injection  750 mg Intravenous Once    [COMPLETED] hydrALAzine (Apresoline) 20 mg/mL injection 10 mg  10 mg Intravenous Once    [COMPLETED] ondansetron (Zofran) 4 MG/2ML injection        [COMPLETED] ketorolac (Toradol) 30 MG/ML injection        [COMPLETED] caffeine-sodium benzoate 125-125 MG/ML injection        [COMPLETED] hydrALAzine (Apresoline) 20 mg/mL injection        [COMPLETED] ketamine (Ketalar) 50 MG/ML injection        [COMPLETED] ondansetron (Zofran) 4 MG/2ML injection 8 mg  8 mg Intravenous Once   [2] (Not in a hospital admission)   [3]   Past Surgical History:  Procedure Laterality Date    Colonoscopy & polypectomy  05/09/2016    diverticulosis and a small polyp was removed; ASC    Colonoscopy,biopsy N/A 05/09/2016    Procedure: COLONOSCOPY, POSSIBLE BIOPSY, POSSIBLE POLYPECTOMY 15307;  Surgeon: Shaun Mock MD;  Location: [facility]    Ect provided Bilateral 12/20/2024    1st. Tx. 12/20/24    Ect(bedside)      Other surgical history      scope knee    Other surgical history  11/29/2024    Aquablation of the prostate    Patient documented not to have experienced any of the following events N/A 05/09/2016    Procedure: COLONOSCOPY, POSSIBLE BIOPSY, POSSIBLE POLYPECTOMY 34721;  Surgeon: Shaun Mock MD;  Location: [facility]    Patient withough preoperative order for iv antibiotic surgical site infection prophylaxis. N/A 05/09/2016    Procedure: COLONOSCOPY, POSSIBLE BIOPSY, POSSIBLE POLYPECTOMY 31555;  Surgeon: Shaun Mock MD;  Location: [facility]  [place]    Special service or report  1990s    R knee arthroscopy lat meniscus   [4]   Social History  Socioeconomic History    Marital status:    Tobacco Use    Smoking status: Never    Smokeless tobacco: Never   Vaping Use    Vaping status: Unknown   Substance and Sexual Activity    Alcohol use: No     Alcohol/week: 0.0 - 1.0 standard drinks of alcohol     Comment: rare    Drug use: No    Sexual activity: Yes

## 2025-04-22 NOTE — PROGRESS NOTES
[facility] / [facility]  ECT Procedure Note    Robe Nunez Patient Status:  Outpatient   Age/Gender 74 year old male   MRN [id number]    Location [facility] POST ANESTHESIA CARE UNIT Attending No att. providers found   Hosp Day # 0 PCP Mango Boland MD     ECT Number: #21 total-#9 in series    Diagnosis: Major Depression Recurrent Severe Without Psychotic Features. F33.2    Type of ECT:  Bitemporal    Place of Service:  Outpatient    Settings:   1.  Energy Percentage: 100%    2.  Program:  DGX    Pre-ECT Evaluation    Symptoms:      Prior to procedure, reviewed with treatment team correct patient, time of procedure and type of ECT.  Also reviewed with anesthesia pre-ECT medications    Pt reports that his mood continues to improve.  Patient's family is concerned that there has been minimal improvement.  Patient denies suicidal thoughts.  Patient is attending IOP.  Patient reports minimal cognitive side effects.  Patient ready to increase interval of ECT to weekly patient with significant post-ECT nausea.  Will speak with outpatient provider, Dr. Reyes, regarding patient's progress and future ECT schedule.  Zofran prescription sent to patient's pharmacy for post-ECT nausea    The patient continues to retain capacity to consent for ECT.    Risk/Benefits:  Discussed with patient side effects of ECT including headache, teeth, jaw, cardiac, pulmonary, NPO, aspiration, allergic reactions, anesthetic reactions, musculoskeletal, neurologic, morbidity/mortality, potential lack of efficacy, unilateral/bilateral ECT, relapse/maintenance issues, cognitive risks including memory, concentration, cognition, and other risks.    Side Effects:  Patient notes no cognitive/physical complaints    Exam:  Mood: less depressed and less anxious  Affect: Congruent and appears brighter and less anxious  Memory:  intact immediate, recent, remote and as evidenced by ability to present consistent  history  Concentration:   focused and attentive and as assessed by  ability to concentrate on our conversation  Suicidal ideation: no suicidal ideation    Patient Monitored:  B/P, EKG, EEG, Pulse Ox, Left Ankle Cuff    Pre-ECT Medications:   Toradol 15 mg IV, Hydralazine 10 mg IV 30 min prior to treatment, and Zofran 8 mg IV caffeine 750 mg IV     ECT Medications:  Labetalol 5 mg IV, Anesthetic  Etomidate 14 mg IV followed by ketamine 25 mg IV, and Succinylcholine 80 mg IV    Seizure Duration:  Motor: 21 seconds       EE seconds    Post-ECT Condition:  Treatment unremarkable    ECT Medications:  None     Frances Sung    2025

## 2025-04-22 NOTE — DISCHARGE INSTRUCTIONS
Discharge Instructions  Electroconvulsive Therapy    Activities:  You MUST arrange to have a responsible adult drive you home and have a responsible adult stay with you the rest of the day and overnight.  Do not drive today.  Do not operate any machinery today. Use kitchen equipment with caution.  Rest and take it easy today.  Do not take public transportation without the presence of another responsible adult for 24 hours    Medications:  Resume your regular medications when you get home  The nurse will instruct you not to take any NSAIDS (Advil, Aleve, Motrin, Ibuprofen) before 1 pm today because you were given a certain medication during the procedure.      Diet:  You may resume your regular diet when you get home  Do not drink alcohol for 24 hours    Additional Instructions:  Someone should call you to schedule any upcoming ECT treatments. Call as needed to schedule or cancel your ECT appointments [phone].  If you have any questions or concerns, please call your own psychiatrist.  For your safety, please do not wear make-up to any future ECT appointments.  For any questions regarding your ECT appointment, please call [facility] [phone].  For cancellations after hours, call [phone] and leave a message.    Expected Recovery:  As you awaken, you may experience one or more of the following:  Headache, nausea, temporary confusion, or muscle stiffness.  If these symptoms increase, become severe or are accompanied by a fever of more than 101, please seek medical attention.  The ECT may affect memory.  Many patients report loss of memory for events that occurred in the days, weeks or months surrounding the ECT.  Many of these memories may return, but not always.  Short-term memory may also be affected for months, but this can also be a result of the disorder that you have.

## 2025-04-29 ENCOUNTER — HOSPITAL ENCOUNTER (OUTPATIENT)
Dept: POSTOP/PACU | Facility: HOSPITAL | Age: 75
Discharge: HOME | End: 2025-04-29
Attending: Other
Payer: MEDICARE

## 2025-04-29 ENCOUNTER — ANESTHESIA EVENT (OUTPATIENT)
Dept: POSTOP/PACU | Facility: HOSPITAL | Age: 75
End: 2025-04-29
Payer: MEDICARE

## 2025-04-29 ENCOUNTER — LAB ENCOUNTER (OUTPATIENT)
Dept: LAB | Facility: HOSPITAL | Age: 75
End: 2025-04-29
Attending: Other
Payer: MEDICARE

## 2025-04-29 ENCOUNTER — ANESTHESIA (OUTPATIENT)
Dept: POSTOP/PACU | Facility: HOSPITAL | Age: 75
End: 2025-04-29
Payer: MEDICARE

## 2025-04-29 VITALS
BODY MASS INDEX: 28 KG/M2 | HEART RATE: 79 BPM | TEMPERATURE: 98 F | RESPIRATION RATE: 12 BRPM | DIASTOLIC BLOOD PRESSURE: 77 MMHG | HEIGHT: 67 IN | SYSTOLIC BLOOD PRESSURE: 145 MMHG | OXYGEN SATURATION: 96 %

## 2025-04-29 DIAGNOSIS — F33.2 SEVERE RECURRENT MAJOR DEPRESSION WITHOUT PSYCHOTIC FEATURES (HCC): ICD-10-CM

## 2025-04-29 DIAGNOSIS — Z79.899 ENCOUNTER FOR LONG-TERM (CURRENT) USE OF MEDICATIONS: ICD-10-CM

## 2025-04-29 LAB
CHOLEST SERPL-MCNC: 146 MG/DL (ref ?–200)
EST. AVERAGE GLUCOSE BLD GHB EST-MCNC: 120 MG/DL (ref 68–126)
FASTING PATIENT LIPID ANSWER: YES
HBA1C MFR BLD: 5.8 % (ref ?–5.7)
HDLC SERPL-MCNC: 59 MG/DL (ref 40–59)
LDLC SERPL CALC-MCNC: 62 MG/DL (ref ?–100)
NONHDLC SERPL-MCNC: 87 MG/DL (ref ?–130)
TRIGL SERPL-MCNC: 145 MG/DL (ref 30–149)
VLDLC SERPL CALC-MCNC: 22 MG/DL (ref 0–30)

## 2025-04-29 PROCEDURE — 36415 COLL VENOUS BLD VENIPUNCTURE: CPT

## 2025-04-29 PROCEDURE — 83036 HEMOGLOBIN GLYCOSYLATED A1C: CPT

## 2025-04-29 PROCEDURE — 80061 LIPID PANEL: CPT

## 2025-04-29 RX ORDER — HYDROMORPHONE HYDROCHLORIDE 1 MG/ML
0.4 INJECTION, SOLUTION INTRAMUSCULAR; INTRAVENOUS; SUBCUTANEOUS EVERY 5 MIN PRN
Status: ACTIVE | OUTPATIENT
Start: 2025-04-29 | End: 2025-04-29

## 2025-04-29 RX ORDER — CAFFEINE AND SODIUM BENZOATE 125 MG/ML
750 INJECTION, SOLUTION INTRAMUSCULAR; INTRAVENOUS ONCE
Status: COMPLETED | OUTPATIENT
Start: 2025-04-29 | End: 2025-04-29

## 2025-04-29 RX ORDER — CAFFEINE AND SODIUM BENZOATE 125 MG/ML
INJECTION, SOLUTION INTRAMUSCULAR; INTRAVENOUS
Status: COMPLETED
Start: 2025-04-29 | End: 2025-04-29

## 2025-04-29 RX ORDER — LABETALOL HYDROCHLORIDE 5 MG/ML
INJECTION, SOLUTION INTRAVENOUS AS NEEDED
Status: DISCONTINUED | OUTPATIENT
Start: 2025-04-29 | End: 2025-04-29 | Stop reason: SURG

## 2025-04-29 RX ORDER — KETAMINE HYDROCHLORIDE 50 MG/ML
INJECTION, SOLUTION INTRAMUSCULAR; INTRAVENOUS AS NEEDED
Status: DISCONTINUED | OUTPATIENT
Start: 2025-04-29 | End: 2025-04-29 | Stop reason: SURG

## 2025-04-29 RX ORDER — KETOROLAC TROMETHAMINE 30 MG/ML
15 INJECTION, SOLUTION INTRAMUSCULAR; INTRAVENOUS ONCE
Status: COMPLETED | OUTPATIENT
Start: 2025-04-29 | End: 2025-04-29

## 2025-04-29 RX ORDER — HYDROMORPHONE HYDROCHLORIDE 1 MG/ML
0.6 INJECTION, SOLUTION INTRAMUSCULAR; INTRAVENOUS; SUBCUTANEOUS EVERY 5 MIN PRN
Status: ACTIVE | OUTPATIENT
Start: 2025-04-29 | End: 2025-04-29

## 2025-04-29 RX ORDER — HYDRALAZINE HYDROCHLORIDE 20 MG/ML
10 INJECTION INTRAMUSCULAR; INTRAVENOUS ONCE
Status: COMPLETED | OUTPATIENT
Start: 2025-04-29 | End: 2025-04-29

## 2025-04-29 RX ORDER — ETOMIDATE 2 MG/ML
INJECTION INTRAVENOUS AS NEEDED
Status: DISCONTINUED | OUTPATIENT
Start: 2025-04-29 | End: 2025-04-29 | Stop reason: SURG

## 2025-04-29 RX ORDER — DIPHENHYDRAMINE HYDROCHLORIDE 50 MG/ML
12.5 INJECTION, SOLUTION INTRAMUSCULAR; INTRAVENOUS AS NEEDED
Status: ACTIVE | OUTPATIENT
Start: 2025-04-29 | End: 2025-04-29

## 2025-04-29 RX ORDER — SODIUM CHLORIDE, SODIUM LACTATE, POTASSIUM CHLORIDE, CALCIUM CHLORIDE 600; 310; 30; 20 MG/100ML; MG/100ML; MG/100ML; MG/100ML
INJECTION, SOLUTION INTRAVENOUS CONTINUOUS
Status: DISCONTINUED | OUTPATIENT
Start: 2025-04-29 | End: 2025-05-01

## 2025-04-29 RX ORDER — ONDANSETRON 2 MG/ML
INJECTION INTRAMUSCULAR; INTRAVENOUS
Status: COMPLETED
Start: 2025-04-29 | End: 2025-04-29

## 2025-04-29 RX ORDER — KETOROLAC TROMETHAMINE 30 MG/ML
INJECTION, SOLUTION INTRAMUSCULAR; INTRAVENOUS
Status: COMPLETED
Start: 2025-04-29 | End: 2025-04-29

## 2025-04-29 RX ORDER — NALOXONE HYDROCHLORIDE 0.4 MG/ML
80 INJECTION, SOLUTION INTRAMUSCULAR; INTRAVENOUS; SUBCUTANEOUS AS NEEDED
Status: ACTIVE | OUTPATIENT
Start: 2025-04-29 | End: 2025-04-29

## 2025-04-29 RX ORDER — METOPROLOL TARTRATE 1 MG/ML
2.5 INJECTION, SOLUTION INTRAVENOUS ONCE
Status: DISCONTINUED | OUTPATIENT
Start: 2025-04-29 | End: 2025-05-01

## 2025-04-29 RX ORDER — HYDRALAZINE HYDROCHLORIDE 20 MG/ML
INJECTION INTRAMUSCULAR; INTRAVENOUS
Status: COMPLETED
Start: 2025-04-29 | End: 2025-04-29

## 2025-04-29 RX ORDER — HYDROMORPHONE HYDROCHLORIDE 1 MG/ML
0.2 INJECTION, SOLUTION INTRAMUSCULAR; INTRAVENOUS; SUBCUTANEOUS EVERY 5 MIN PRN
Status: ACTIVE | OUTPATIENT
Start: 2025-04-29 | End: 2025-04-29

## 2025-04-29 RX ORDER — MIDAZOLAM HYDROCHLORIDE 1 MG/ML
1 INJECTION INTRAMUSCULAR; INTRAVENOUS EVERY 5 MIN PRN
Status: ACTIVE | OUTPATIENT
Start: 2025-04-29 | End: 2025-04-29

## 2025-04-29 RX ORDER — ONDANSETRON 2 MG/ML
8 INJECTION INTRAMUSCULAR; INTRAVENOUS ONCE
Status: COMPLETED | OUTPATIENT
Start: 2025-04-29 | End: 2025-04-29

## 2025-04-29 RX ADMIN — KETAMINE HYDROCHLORIDE 25 MG: 50 INJECTION, SOLUTION INTRAMUSCULAR; INTRAVENOUS at 07:12:00

## 2025-04-29 RX ADMIN — ONDANSETRON 8 MG: 2 INJECTION INTRAMUSCULAR; INTRAVENOUS at 06:44:00

## 2025-04-29 RX ADMIN — SODIUM CHLORIDE, SODIUM LACTATE, POTASSIUM CHLORIDE, CALCIUM CHLORIDE: 600; 310; 30; 20 INJECTION, SOLUTION INTRAVENOUS at 06:45:00

## 2025-04-29 RX ADMIN — LABETALOL HYDROCHLORIDE 5 MG: 5 INJECTION, SOLUTION INTRAVENOUS at 07:12:00

## 2025-04-29 RX ADMIN — HYDRALAZINE HYDROCHLORIDE 10 MG: 20 INJECTION INTRAMUSCULAR; INTRAVENOUS at 06:45:00

## 2025-04-29 RX ADMIN — CAFFEINE AND SODIUM BENZOATE 750 MG: 125 INJECTION, SOLUTION INTRAMUSCULAR; INTRAVENOUS at 06:59:00

## 2025-04-29 RX ADMIN — ETOMIDATE 14 MG: 2 INJECTION INTRAVENOUS at 07:12:00

## 2025-04-29 RX ADMIN — KETOROLAC TROMETHAMINE 15 MG: 30 INJECTION, SOLUTION INTRAMUSCULAR; INTRAVENOUS at 06:45:00

## 2025-04-29 NOTE — PROGRESS NOTES
[facility]  ECT History & Physical    Robe Nunez Patient Status:  Outpatient   Age/Gender 74 year old male MRN [id number]   Location [facility] POST ANESTHESIA CARE UNIT Attending Frances Sugn MD   Hosp Day # 0 PCP Mango Boland MD     Date of Service: 4/29/2025    Diagnosis:  Major Depression Recurrent Severe Without Psychotic Features. F33.2    Procedure:  Bitemporal    HPI: Gradually improving in all areas.  No physical complaints      Medical History:  Past Medical History[1]    Surgical History:  Past Surgical History[2]    Family History:  Family History[3]    Social History:  Social History     Socioeconomic History    Marital status:      Spouse name: Not on file    Number of children: Not on file    Years of education: Not on file    Highest education level: Not on file   Occupational History    Not on file   Tobacco Use    Smoking status: Never    Smokeless tobacco: Never   Vaping Use    Vaping status: Unknown   Substance and Sexual Activity    Alcohol use: No     Alcohol/week: 0.0 - 1.0 standard drinks of alcohol     Comment: rare    Drug use: No    Sexual activity: Yes   Other Topics Concern    Not on file   Social History Narrative    marital status:     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[address]    caffeine: few cups    blood tranfusion: no    hiv exposure: no    travel: domestic    exercise: health club 2 x week    mammo:      dexa:      pap:      colonoscopy: 2006 ok    lab: 2012, 2013,2014    prostate: 2013,2014    testicles: 2013,2013    influenza: 2012,2013    bdt: yes    pneumovax: no    zoster: 2013                         Social Drivers of Health     Food Insecurity: No Food Insecurity (3/25/2025)    NCSS - Food Insecurity     Worried About Running Out of Food in the Last Year: No     Ran Out of Food in the Last Year: No   Transportation Needs: No Transportation Needs (3/25/2025)    NCSS - Transportation     Lack of Transportation: No   Housing Stability:  Not At Risk (3/25/2025)    NCSS - Housing/Utilities     Has Housing: Yes     Worried About Losing Housing: No     Unable to Get Utilities: No       ROS:  unremarkable    Physical Exam:   Ht 67\"   BMI 27.62 kg/m²     General Appearance:    Alert, cooperative, no distress, appears stated age   Head:    Normocephalic, without obvious abnormality, atraumatic   Eyes:    PERRL, conjunctiva/corneas clear, EOM's intact   Nose:   Nares normal, septum midline, mucosa normal, no drainage    or sinus tenderness   Throat:   Lips, mucosa, and tongue normal; teeth and gums normal   Neck:   Supple, symmetrical, trachea midline   Lungs:     Clear to auscultation bilaterally, respirations unlabored    Heart:    Regular rate and rhythm, S1 and S2 normal, no murmur, rub or gallop   Abdomen:     Soft, non-tender, bowel sounds active all four quadrants,     no masses, no organomegaly   Extremities:   Extremities normal, atraumatic, no cyanosis or edema   Pulses:   2+ and symmetric all extremities   Skin:   Skin color, texture, turgor normal, no rashes or lesions   Neurologic:   CNII-XII intact, normal strength, sensation and reflexes     throughout     Impressions & Plans:    Diagnosis:  Major Depression Recurrent Severe Without Psychotic Features. F33.2    Procedure:  Bitemporal    I have discussed the risks and benefits and alternatives with the patient/family.  They understand and agree to proceed with plan of care.    Frances Sung MD  4/29/2025         [1]   Past Medical History:   Cancer (HCC)    Skin cancer    Depression    Headache disorder    High blood pressure    High cholesterol    HTN (hypertension)    Hyperlipidemia    Nonspecific elevation of levels of transaminase or lactic acid dehydrogenase (LDH)    s/p liver biopsy normal    Visual impairment    glasses   [2]   Past Surgical History:  Procedure Laterality Date    Colonoscopy & polypectomy  05/09/2016    diverticulosis and a small polyp was removed; ASC     Colonoscopy,biopsy N/A 05/09/2016    Procedure: COLONOSCOPY, POSSIBLE BIOPSY, POSSIBLE POLYPECTOMY 25167;  Surgeon: Shaun Mock MD;  Location: [facility]    Ect provided Bilateral 12/20/2024    1st. Tx. 12/20/24    Ect(bedside)      Other surgical history      scope knee    Other surgical history  11/29/2024    Aquablation of the prostate    Patient documented not to have experienced any of the following events N/A 05/09/2016    Procedure: COLONOSCOPY, POSSIBLE BIOPSY, POSSIBLE POLYPECTOMY 96570;  Surgeon: Shaun Mock MD;  Location: [facility]    Patient withough preoperative order for iv antibiotic surgical site infection prophylaxis. N/A 05/09/2016    Procedure: COLONOSCOPY, POSSIBLE BIOPSY, POSSIBLE POLYPECTOMY 60342;  Surgeon: Shaun Mock MD;  Location: [facility]    Special service or report  1990s    R knee arthroscopy lat meniscus   [3]   Family History  Problem Relation Age of Onset    Hypertension Father     Diabetes Father     Lipids Brother     Cancer Brother         prostate ca    Other (Other[other]) Sister         ?sle

## 2025-04-29 NOTE — ANESTHESIA PREPROCEDURE EVALUATION
PRE-OP EVALUATION    Patient Name: Robe Nunez    Admit Diagnosis: Severe recurrent major depression without psychotic features (formerly [facility]) [F33.2]    Pre-op Diagnosis: * No pre-op diagnosis entered *        Anesthesia Procedure: ECT(BEDSIDE)    * No surgeons found in log *    Pre-op vitals reviewed.        Body mass index is 27.62 kg/m².    Current medications reviewed.  Hospital Medications:  Current Medications[1]    Outpatient Medications:   Prescriptions Prior to Admission[2]    Allergies: Other and Seasonal      Anesthesia Evaluation    Patient summary reviewed.    Anesthetic Complications  (-) history of anesthetic complications         GI/Hepatic/Renal    Negative GI/hepatic/renal ROS.                             Cardiovascular      ECG reviewed.  Exercise tolerance: good     MET: >4      (+) hypertension                                     Endo/Other                                  Pulmonary    Negative pulmonary ROS.                       Neuro/Psych      (+) depression                                Past Surgical History[3]  Social Hx on file[4]  History   Drug Use No     Available pre-op labs reviewed.  Lab Results   Component Value Date    WBC 6.6 03/23/2025    RBC 4.41 03/23/2025    HGB 12.8 (L) 03/23/2025    HCT 36.4 (L) 03/23/2025    MCV 82.5 03/23/2025    MCH 29.0 03/23/2025    MCHC 35.2 03/23/2025    RDW 12.2 03/23/2025    .0 03/23/2025     Lab Results   Component Value Date     03/23/2025    K 3.9 03/23/2025     03/23/2025    CO2 25.0 03/23/2025    BUN 12 03/23/2025    CREATSERUM 0.97 03/23/2025     (H) 03/23/2025    CA 10.0 03/23/2025              Airway      Mallampati: I      Neck ROM: limited Cardiovascular    Cardiovascular exam normal.         Dental    Dentition appears grossly intact         Pulmonary    Pulmonary exam normal.                 Other findings              ASA: 2   Plan: general  NPO status verified and patient meets guidelines.    Post-procedure pain  management plan discussed with surgeon and patient.      Plan/risks discussed with: patient                Present on Admission:  **None**             [1]   No current facility-administered medications on file as of 4/29/2025.   [2] (Not in a hospital admission)   [3]   Past Surgical History:  Procedure Laterality Date    Colonoscopy & polypectomy  05/09/2016    diverticulosis and a small polyp was removed; ASC    Colonoscopy,biopsy N/A 05/09/2016    Procedure: COLONOSCOPY, POSSIBLE BIOPSY, POSSIBLE POLYPECTOMY 67699;  Surgeon: Shaun Mock MD;  Location: [facility]    Ect provided Bilateral 12/20/2024    1st. Tx. 12/20/24    Ect(bedside)      Other surgical history      scope knee    Other surgical history  11/29/2024    Aquablation of the prostate    Patient documented not to have experienced any of the following events N/A 05/09/2016    Procedure: COLONOSCOPY, POSSIBLE BIOPSY, POSSIBLE POLYPECTOMY 78337;  Surgeon: Shaun Mock MD;  Location: [facility]    Patient withough preoperative order for iv antibiotic surgical site infection prophylaxis. N/A 05/09/2016    Procedure: COLONOSCOPY, POSSIBLE BIOPSY, POSSIBLE POLYPECTOMY 92330;  Surgeon: Shaun Mock MD;  Location: [facility]    Special service or report  1990s    R knee arthroscopy lat meniscus   [4]   Social History  Socioeconomic History    Marital status:    Tobacco Use    Smoking status: Never    Smokeless tobacco: Never   Vaping Use    Vaping status: Unknown   Substance and Sexual Activity    Alcohol use: No     Alcohol/week: 0.0 - 1.0 standard drinks of alcohol     Comment: rare    Drug use: No    Sexual activity: Yes

## 2025-04-29 NOTE — ANESTHESIA POSTPROCEDURE EVALUATION
[facility]    Robe Nunez Patient Status:  Outpatient   Age/Gender 74 year old male MRN [id number]   Location [facility] POST ANESTHESIA CARE UNIT Attending Frances Sung MD   Hosp Day # 0 PCP Mango Boland MD       Anesthesia Post-op Note        Procedure Summary       Date: 04/29/25 Room / Location: [facility] Post Anesthesia Care Unit    Anesthesia Start: 0712 Anesthesia Stop: 0731    Procedure: ECT(BEDSIDE) Diagnosis: Severe recurrent major depression without psychotic features (HCC)    Scheduled Providers:  Anesthesiologist: Mihir Ledbetter MD    Anesthesia Type: general ASA Status: 2            Anesthesia Type: general    Vitals Value Taken Time   /72 04/29/25 07:31   Temp 97 04/29/25 07:31   Pulse 85 04/29/25 07:31   Resp 19 04/29/25 07:31   SpO2 99 04/29/25 07:31           Patient Location: PACU    Anesthesia Type: general    Airway Patency: patent    Postop Pain Control: adequate    Mental Status: mildly sedated but able to meaningfully participate in the post-anesthesia evaluation    Nausea/Vomiting: none    Cardiopulmonary/Hydration status: stable euvolemic    Complications: no apparent anesthesia related complications    Postop vital signs: stable    Dental Exam: Unchanged from Preop    Patient to be discharged from PACU when criteria met.

## 2025-04-29 NOTE — PROGRESS NOTES
[facility] / [facility]  ECT Procedure Note    Robe Nunez Patient Status:  Outpatient   Age/Gender 74 year old male   MRN [id number]    Location [facility] POST ANESTHESIA CARE UNIT Attending No att. 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  Energy Percentage: 100%    2.  Program:  DGX    Pre-ECT Evaluation    Symptoms:      Prior to procedure, reviewed with treatment team correct patient, time of procedure and type of ECT.  Also reviewed with anesthesia pre-ECT medications    Patient seen by Dr. Reyes with wife last week.  He has made some progress per family, but still with low mood and energy, poor appetite, anxiety, ruminations, poor concentration, anhedonia.  He has continued to attend IOP though he does not like it.  He is a little more forthcoming with this writer today.  He reports that he feels better than he was but not back to normal.  Sleeps with seroquel.  Appetite is still not good. No SI or hopelessness.  He apperas visibly disappointed at this writer's recommendation to continue weekly ECT for now    The patient continues to retain capacity to consent for ECT.    Risk/Benefits:  Discussed with patient side effects of ECT including headache, teeth, jaw, cardiac, pulmonary, NPO, aspiration, allergic reactions, anesthetic reactions, musculoskeletal, neurologic, morbidity/mortality, potential lack of efficacy, unilateral/bilateral ECT, relapse/maintenance issues, cognitive risks including memory, concentration, cognition, and other risks.    Side Effects:  Patient notes no cognitive/physical complaints    Exam:  Mood: less depressed and less anxious  Affect: Congruent and Restricted  Memory:  intact immediate, recent, remote and as evidenced by ability to present  consistent history  Concentration:   alert and as assessed by  ability to concentrate on our conversation  Suicidal ideation: no suicidal ideation    Patient Monitored:  B/P, EKG, EEG, Pulse Ox, Left Ankle Cuff    Pre-ECT Medications:   Toradol 15 mg IV, Hydralazine 10 mg IV 30 min prior to treatment, and Zofran 8 mg IV caffeine 750 mg IV     ECT Medications:  Labetalol 5 mg IV, Anesthetic  Etomidate 14 mg IV followed by ketamine 25 mg IV, and Succinylcholine 80 mg IV       Seizure Duration:  Motor: 28 seconds       EE seconds    Post-ECT Condition:  Treatment unremarkable    ECT Medications: None    Frances Sung    2025

## 2025-05-02 VITALS — BODY MASS INDEX: 28 KG/M2 | HEIGHT: 67 IN

## 2025-05-05 RX ORDER — HYDRALAZINE HYDROCHLORIDE 20 MG/ML
10 INJECTION INTRAMUSCULAR; INTRAVENOUS ONCE
OUTPATIENT
Start: 2025-05-05 | End: 2025-05-05

## 2025-05-05 RX ORDER — SODIUM CHLORIDE, SODIUM LACTATE, POTASSIUM CHLORIDE, CALCIUM CHLORIDE 600; 310; 30; 20 MG/100ML; MG/100ML; MG/100ML; MG/100ML
INJECTION, SOLUTION INTRAVENOUS CONTINUOUS
OUTPATIENT
Start: 2025-05-05

## 2025-05-05 RX ORDER — KETOROLAC TROMETHAMINE 30 MG/ML
15 INJECTION, SOLUTION INTRAMUSCULAR; INTRAVENOUS ONCE
OUTPATIENT
Start: 2025-05-05 | End: 2025-05-05

## 2025-05-05 RX ORDER — CAFFEINE AND SODIUM BENZOATE 125 MG/ML
750 INJECTION, SOLUTION INTRAMUSCULAR; INTRAVENOUS ONCE
OUTPATIENT
Start: 2025-05-05 | End: 2025-05-05

## 2025-05-05 RX ORDER — ONDANSETRON 2 MG/ML
8 INJECTION INTRAMUSCULAR; INTRAVENOUS ONCE
OUTPATIENT
Start: 2025-05-05 | End: 2025-05-05

## 2025-05-06 ENCOUNTER — HOSPITAL ENCOUNTER (OUTPATIENT)
Dept: POSTOP/PACU | Facility: HOSPITAL | Age: 75
Discharge: HOME | End: 2025-05-06
Attending: Other
Payer: MEDICARE

## 2025-05-14 ENCOUNTER — ANESTHESIA (OUTPATIENT)
Dept: POSTOP/PACU | Facility: HOSPITAL | Age: 75
End: 2025-05-14
Payer: MEDICARE

## 2025-05-14 ENCOUNTER — HOSPITAL ENCOUNTER (OUTPATIENT)
Dept: POSTOP/PACU | Facility: HOSPITAL | Age: 75
Discharge: HOME | End: 2025-05-14
Attending: Other
Payer: MEDICARE

## 2025-05-14 ENCOUNTER — ANESTHESIA EVENT (OUTPATIENT)
Dept: POSTOP/PACU | Facility: HOSPITAL | Age: 75
End: 2025-05-14
Payer: MEDICARE

## 2025-05-14 VITALS
SYSTOLIC BLOOD PRESSURE: 146 MMHG | TEMPERATURE: 99 F | HEIGHT: 67 IN | DIASTOLIC BLOOD PRESSURE: 77 MMHG | HEART RATE: 87 BPM | OXYGEN SATURATION: 96 % | RESPIRATION RATE: 8 BRPM | BODY MASS INDEX: 28 KG/M2

## 2025-05-14 DIAGNOSIS — F33.2 SEVERE RECURRENT MAJOR DEPRESSION WITHOUT PSYCHOTIC FEATURES (HCC): ICD-10-CM

## 2025-05-14 RX ORDER — KETOROLAC TROMETHAMINE 30 MG/ML
15 INJECTION, SOLUTION INTRAMUSCULAR; INTRAVENOUS ONCE
Status: COMPLETED | OUTPATIENT
Start: 2025-05-14 | End: 2025-05-14

## 2025-05-14 RX ORDER — CAFFEINE AND SODIUM BENZOATE 125 MG/ML
INJECTION, SOLUTION INTRAMUSCULAR; INTRAVENOUS
Status: COMPLETED
Start: 2025-05-14 | End: 2025-05-14

## 2025-05-14 RX ORDER — ACETAMINOPHEN 500 MG
1000 TABLET ORAL ONCE AS NEEDED
Status: ACTIVE | OUTPATIENT
Start: 2025-05-14 | End: 2025-05-14

## 2025-05-14 RX ORDER — HYDROMORPHONE HYDROCHLORIDE 1 MG/ML
0.2 INJECTION, SOLUTION INTRAMUSCULAR; INTRAVENOUS; SUBCUTANEOUS EVERY 5 MIN PRN
Status: ACTIVE | OUTPATIENT
Start: 2025-05-14 | End: 2025-05-14

## 2025-05-14 RX ORDER — HYDROMORPHONE HYDROCHLORIDE 1 MG/ML
0.4 INJECTION, SOLUTION INTRAMUSCULAR; INTRAVENOUS; SUBCUTANEOUS EVERY 5 MIN PRN
Status: ACTIVE | OUTPATIENT
Start: 2025-05-14 | End: 2025-05-14

## 2025-05-14 RX ORDER — HYDRALAZINE HYDROCHLORIDE 20 MG/ML
10 INJECTION INTRAMUSCULAR; INTRAVENOUS ONCE
Status: COMPLETED | OUTPATIENT
Start: 2025-05-14 | End: 2025-05-14

## 2025-05-14 RX ORDER — LABETALOL HYDROCHLORIDE 5 MG/ML
INJECTION, SOLUTION INTRAVENOUS AS NEEDED
Status: DISCONTINUED | OUTPATIENT
Start: 2025-05-14 | End: 2025-05-14 | Stop reason: SURG

## 2025-05-14 RX ORDER — KETAMINE HYDROCHLORIDE 50 MG/ML
INJECTION, SOLUTION INTRAMUSCULAR; INTRAVENOUS AS NEEDED
Status: DISCONTINUED | OUTPATIENT
Start: 2025-05-14 | End: 2025-05-14 | Stop reason: SURG

## 2025-05-14 RX ORDER — MIDAZOLAM HYDROCHLORIDE 1 MG/ML
1 INJECTION INTRAMUSCULAR; INTRAVENOUS EVERY 5 MIN PRN
Status: ACTIVE | OUTPATIENT
Start: 2025-05-14 | End: 2025-05-14

## 2025-05-14 RX ORDER — NALOXONE HYDROCHLORIDE 0.4 MG/ML
80 INJECTION, SOLUTION INTRAMUSCULAR; INTRAVENOUS; SUBCUTANEOUS AS NEEDED
Status: ACTIVE | OUTPATIENT
Start: 2025-05-14 | End: 2025-05-14

## 2025-05-14 RX ORDER — SODIUM CHLORIDE, SODIUM LACTATE, POTASSIUM CHLORIDE, CALCIUM CHLORIDE 600; 310; 30; 20 MG/100ML; MG/100ML; MG/100ML; MG/100ML
INJECTION, SOLUTION INTRAVENOUS CONTINUOUS
Status: DISCONTINUED | OUTPATIENT
Start: 2025-05-14 | End: 2025-05-16

## 2025-05-14 RX ORDER — HYDROMORPHONE HYDROCHLORIDE 1 MG/ML
0.6 INJECTION, SOLUTION INTRAMUSCULAR; INTRAVENOUS; SUBCUTANEOUS EVERY 5 MIN PRN
Status: ACTIVE | OUTPATIENT
Start: 2025-05-14 | End: 2025-05-14

## 2025-05-14 RX ORDER — ONDANSETRON 2 MG/ML
8 INJECTION INTRAMUSCULAR; INTRAVENOUS ONCE
Status: COMPLETED | OUTPATIENT
Start: 2025-05-14 | End: 2025-05-14

## 2025-05-14 RX ORDER — LABETALOL HYDROCHLORIDE 5 MG/ML
5 INJECTION, SOLUTION INTRAVENOUS EVERY 5 MIN PRN
Status: ACTIVE | OUTPATIENT
Start: 2025-05-14 | End: 2025-05-14

## 2025-05-14 RX ORDER — CAFFEINE AND SODIUM BENZOATE 125 MG/ML
750 INJECTION, SOLUTION INTRAMUSCULAR; INTRAVENOUS ONCE
Status: COMPLETED | OUTPATIENT
Start: 2025-05-14 | End: 2025-05-14

## 2025-05-14 RX ORDER — ETOMIDATE 2 MG/ML
INJECTION INTRAVENOUS AS NEEDED
Status: DISCONTINUED | OUTPATIENT
Start: 2025-05-14 | End: 2025-05-14 | Stop reason: SURG

## 2025-05-14 RX ORDER — KETOROLAC TROMETHAMINE 30 MG/ML
INJECTION, SOLUTION INTRAMUSCULAR; INTRAVENOUS
Status: COMPLETED
Start: 2025-05-14 | End: 2025-05-14

## 2025-05-14 RX ORDER — SODIUM CHLORIDE, SODIUM LACTATE, POTASSIUM CHLORIDE, CALCIUM CHLORIDE 600; 310; 30; 20 MG/100ML; MG/100ML; MG/100ML; MG/100ML
INJECTION, SOLUTION INTRAVENOUS CONTINUOUS PRN
Status: DISCONTINUED | OUTPATIENT
Start: 2025-05-14 | End: 2025-05-14 | Stop reason: SURG

## 2025-05-14 RX ORDER — ONDANSETRON 2 MG/ML
INJECTION INTRAMUSCULAR; INTRAVENOUS
Status: COMPLETED
Start: 2025-05-14 | End: 2025-05-14

## 2025-05-14 RX ORDER — HYDRALAZINE HYDROCHLORIDE 20 MG/ML
INJECTION INTRAMUSCULAR; INTRAVENOUS
Status: COMPLETED
Start: 2025-05-14 | End: 2025-05-14

## 2025-05-14 RX ADMIN — SODIUM CHLORIDE, SODIUM LACTATE, POTASSIUM CHLORIDE, CALCIUM CHLORIDE: 600; 310; 30; 20 INJECTION, SOLUTION INTRAVENOUS at 06:26:00

## 2025-05-14 RX ADMIN — SODIUM CHLORIDE, SODIUM LACTATE, POTASSIUM CHLORIDE, CALCIUM CHLORIDE: 600; 310; 30; 20 INJECTION, SOLUTION INTRAVENOUS at 06:40:00

## 2025-05-14 RX ADMIN — KETAMINE HYDROCHLORIDE 25 MG: 50 INJECTION, SOLUTION INTRAMUSCULAR; INTRAVENOUS at 07:00:00

## 2025-05-14 RX ADMIN — KETOROLAC TROMETHAMINE 15 MG: 30 INJECTION, SOLUTION INTRAMUSCULAR; INTRAVENOUS at 06:24:00

## 2025-05-14 RX ADMIN — LABETALOL HYDROCHLORIDE 5 MG: 5 INJECTION, SOLUTION INTRAVENOUS at 07:00:00

## 2025-05-14 RX ADMIN — ONDANSETRON 8 MG: 2 INJECTION INTRAMUSCULAR; INTRAVENOUS at 06:25:00

## 2025-05-14 RX ADMIN — CAFFEINE AND SODIUM BENZOATE 750 MG: 125 INJECTION, SOLUTION INTRAMUSCULAR; INTRAVENOUS at 06:25:00

## 2025-05-14 RX ADMIN — ETOMIDATE 14 MG: 2 INJECTION INTRAVENOUS at 07:00:00

## 2025-05-14 RX ADMIN — HYDRALAZINE HYDROCHLORIDE 10 MG: 20 INJECTION INTRAMUSCULAR; INTRAVENOUS at 06:25:00

## 2025-05-14 NOTE — ANESTHESIA POSTPROCEDURE EVALUATION
[facility]    Robe Nunez Patient Status:  Outpatient   Age/Gender 74 year old male MRN [id number]   Location [facility] POST ANESTHESIA CARE UNIT Attending Bryn Taylor MD   Hosp Day # 0 PCP Mango Boland MD       Anesthesia Post-op Note        Procedure Summary       Date: 05/14/25 Room / Location: [facility] Post Anesthesia Care Unit    Anesthesia Start: 0640 Anesthesia Stop:     Procedure: ECT(BEDSIDE) Diagnosis: Severe recurrent major depression without psychotic features (HCC)    Scheduled Providers:  Anesthesiologist: Kevin Myers MD    Anesthesia Type: general ASA Status: 2            Anesthesia Type: general    Vitals Value Taken Time   /78 05/14/25 07:07        Pulse 69 05/14/25 07:07   Resp 16 05/14/25 07:07   SpO2 92% 05/14/25 07:07           Patient Location: PACU    Anesthesia Type: general    Airway Patency: patent    Postop Pain Control: adequate    Mental Status: mildly sedated but able to meaningfully participate in the post-anesthesia evaluation    Nausea/Vomiting: none    Cardiopulmonary/Hydration status: stable euvolemic    Complications: no apparent anesthesia related complications    Postop vital signs: stable    Dental Exam: Unchanged from Preop    Patient to be discharged from PACU when criteria met.

## 2025-05-14 NOTE — PROGRESS NOTES
[facility] / [facility]  ECT Procedure Note    Robe Nunez Patient Status:  Outpatient   Age/Gender 74 year old male MRN [id number]   Location [facility] POST ANESTHESIA CARE UNIT Attending Bryn Taylor MD   Hosp Day # 0 PCP Mango Boland MD     2025    ECT Number: ***    Diagnosis: {EMIL ECT DIAGNOSIS NEW:7200}    Type of ECT:  {ECT TYPE:7199}    Place of Service:  {inpatient / outpatient:}    Settings:   1.  Energy Percentage: { ECT SETTINGS:4416}    2.  Program:  { ECT PROGRAM:4417}    Pre-ECT Evaluation    Symptoms:  ***    Risk/Benefits:  {[facility] ECT RISK/BENEFITS:4580}    Side Effects:  {[facility] ECT SIDE EFFECTS:4581}    Exam:  Mood: {LOMG MSE MOOD 2:7203}  Affect: {LOMG MSE 2 AFFECT:270350::\"Congruent\"}  Memory:  {LOMG MSE MEMORY:372762::\"intact immediate, recent, remote\"}  Concentration:   {:392046}  Suicidal ideation: {Suicidal ideation:459155}    Prior to procedure, reviewed with treatment team correct patient, time of procedure and type of ECT.  Also reviewed with anesthesia pre-ECT medications.    Patient Monitored:  { ECT MONITORIN::\"B/P, EKG, EEG, Pulse Ox, Left Ankle Cuff\"}    Pre-ECT Medications: {Pre-ECT medications:8781}    ECT Medications:  {ECT Medications:7202::\"Succinylcholine ***\"}    Seizure Duration:  Motor: *** seconds       EEG: *** seconds    Post-ECT Condition:  { POST ECT CONDITION:4424::\"Treatment unremarkable\"}    Post-ECT Medications:  { POST ECT MEDICATIONS:4425::\"Propofol ***\"}    Bryn Taylor MD           ECT.  Also reviewed with anesthesia pre-ECT medications.    Patient Monitored:  B/P, EKG, EEG, Pulse Ox, Left Ankle Cuff    Pre-ECT Medications: Toradol 15 mg IV, Hydralazine 10 mg IV 30 min prior to treatment, and Zofran 8 mg IV and caffeine 750 mg IV    ECT Medications:  Labetalol 5 mg IV, Anesthetic  Etomidate 14 mg IV followed by ketamine 25 mg IV, and Succinylcholine 80 mg IV    Seizure Duration:  Motor: 49 seconds       EE seconds    Post-ECT Condition:  Treatment unremarkable    Post-ECT Medications:  None     Bryn Taylor MD

## 2025-05-14 NOTE — DISCHARGE INSTRUCTIONS
Discharge Instructions  Electroconvulsive Therapy    Activities:  You MUST arrange to have a responsible adult drive you home and have a responsible adult stay with you the rest of the day and overnight.  Do not drive today.  Do not operate any machinery today. Use kitchen equipment with caution.  Rest and take it easy today.  Do not take public transportation without the presence of another responsible adult for 24 hours    Medications:  Resume your regular medications when you get home  The nurse will instruct you not to take any NSAIDS (Advil, Aleve, Motrin, Ibuprofen) before 1 pm today because you were given a certain medication during the procedure.      Diet:  You may resume your regular diet when you get home  Do not drink alcohol for 24 hours    Additional Instructions:  Someone should call you to schedule any upcoming ECT treatments. Call as needed to schedule or cancel your ECT appointments [phone].  If you have any questions or concerns, please call your own psychiatrist.  For your safety, please do not wear make-up to any future ECT appointments.  For any questions regarding your ECT appointment, please call [facility] [phone].  For cancellations after hours, call [phone] and leave a message.    Expected Recovery:  As you awaken, you may experience one or more of the following:  Headache, nausea, temporary confusion, or muscle stiffness.  If these symptoms increase, become severe or are accompanied by a fever of more than 101, please seek medical attention.  The ECT may affect memory.  Many patients report loss of memory for events that occurred in the days, weeks or months surrounding the ECT.  Many of these memories may return, but not always.  Short-term memory may also be affected for months, but this can also be a result of the disorder that you have.

## 2025-05-14 NOTE — ANESTHESIA PREPROCEDURE EVALUATION
PRE-OP EVALUATION    Patient Name: Robe Nunez    Admit Diagnosis: Severe recurrent major depression without psychotic features ([facility]) [F33.2]    Pre-op Diagnosis: * No pre-op diagnosis entered *        Anesthesia Procedure: ECT(BEDSIDE)    * No surgeons found in log *    Pre-op vitals reviewed.  Temp: 98 °F (36.7 °C)  Pulse: 73  Resp: 15  BP: 128/78  SpO2: 97 %  Body mass index is 27.62 kg/m².    Current medications reviewed.  Hospital Medications:  Current Medications[1]    Outpatient Medications:   Prescriptions Prior to Admission[2]    Allergies: Other and Seasonal      Anesthesia Evaluation    Patient summary reviewed.    Anesthetic Complications  (-) history of anesthetic complications         GI/Hepatic/Renal    Negative GI/hepatic/renal ROS.                             Cardiovascular      ECG reviewed.  Exercise tolerance: good     MET: >4      (+) hypertension                                     Endo/Other                                  Pulmonary    Negative pulmonary ROS.                       Neuro/Psych      (+) depression                                Past Surgical History[3]  Social Hx on file[4]  History   Drug Use No     Available pre-op labs reviewed.  Lab Results   Component Value Date    WBC 6.6 03/23/2025    RBC 4.41 03/23/2025    HGB 12.8 (L) 03/23/2025    HCT 36.4 (L) 03/23/2025    MCV 82.5 03/23/2025    MCH 29.0 03/23/2025    MCHC 35.2 03/23/2025    RDW 12.2 03/23/2025    .0 03/23/2025     Lab Results   Component Value Date     03/23/2025    K 3.9 03/23/2025     03/23/2025    CO2 25.0 03/23/2025    BUN 12 03/23/2025    CREATSERUM 0.97 03/23/2025     (H) 03/23/2025    CA 10.0 03/23/2025              Airway      Mallampati: I      Neck ROM: limited Cardiovascular    Cardiovascular exam normal.         Dental    Dentition appears grossly intact         Pulmonary    Pulmonary exam normal.                 Other findings              ASA: 2   Plan: general  NPO status  verified and patient meets guidelines.    Post-procedure pain management plan discussed with surgeon and patient.      Plan/risks discussed with: patient                Present on Admission:  **None**             [1]    lactated ringers infusion   Intravenous Continuous    [COMPLETED] ketorolac (Toradol) 30 MG/ML injection 15 mg  15 mg Intravenous Once    [COMPLETED] ondansetron (Zofran) 4 MG/2ML injection 8 mg  8 mg Intravenous Once    [COMPLETED] caffeine-sodium benzoate 125-125 mg/mL injection  750 mg Intravenous Once    [COMPLETED] hydrALAzine (Apresoline) 20 mg/mL injection 10 mg  10 mg Intravenous Once    lactated ringers infusion   Intravenous Continuous   [2] (Not in a hospital admission)   [3]   Past Surgical History:  Procedure Laterality Date    Colonoscopy & polypectomy  05/09/2016    diverticulosis and a small polyp was removed; ASC    Colonoscopy,biopsy N/A 05/09/2016    Procedure: COLONOSCOPY, POSSIBLE BIOPSY, POSSIBLE POLYPECTOMY 22223;  Surgeon: Shaun Mock MD;  Location: [facility]    Ect provided Bilateral 12/20/2024    1st. Tx. 12/20/24    Ect(bedside)      Other surgical history      scope knee    Other surgical history  11/29/2024    Aquablation of the prostate    Patient documented not to have experienced any of the following events N/A 05/09/2016    Procedure: COLONOSCOPY, POSSIBLE BIOPSY, POSSIBLE POLYPECTOMY 24146;  Surgeon: Shaun Mock MD;  Location: [facility]    Patient withough preoperative order for iv antibiotic surgical site infection prophylaxis. N/A 05/09/2016    Procedure: COLONOSCOPY, POSSIBLE BIOPSY, POSSIBLE POLYPECTOMY 26614;  Surgeon: Shaun Mock MD;  Location: [facility]    Special service or report  1990s    R knee arthroscopy lat meniscus   [4]   Social History  Socioeconomic History    Marital status:    Tobacco Use    Smoking status: Never    Smokeless tobacco: Never   Vaping Use    Vaping status: Unknown    Substance and Sexual Activity    Alcohol use: No     Alcohol/week: 0.0 - 1.0 standard drinks of alcohol     Comment: rare    Drug use: No    Sexual activity: Yes

## 2025-05-14 NOTE — PROGRESS NOTES
[facility] / [facility]  ECT History & Physical    Robe Nunez Patient Status:  Outpatient   Age/Gender 74 year old male MRN [id number]   Location [facility] POST ANESTHESIA CARE UNIT Attending Bryn Taylor MD   Hosp Day # 0 PCP Mango Boland MD     Date: 5/14/2025    Diagnosis:  ***    Procedure:  ECT    HPI:     ***      Medical History:  Past Medical History:    Cancer (HCC)    Skin cancer    Depression    Headache disorder    High blood pressure    High cholesterol    HTN (hypertension)    Hyperlipidemia    Nonspecific elevation of levels of transaminase or lactic acid dehydrogenase (LDH)    s/p liver biopsy normal    Visual impairment    glasses       Surgical History:  Past Surgical History:   Procedure Laterality Date    Colonoscopy & polypectomy  05/09/2016    diverticulosis and a small polyp was removed; ASC    Colonoscopy,biopsy N/A 05/09/2016    Procedure: COLONOSCOPY, POSSIBLE BIOPSY, POSSIBLE POLYPECTOMY 75142;  Surgeon: Shaun Mock MD;  Location: [facility]    Ect provided Bilateral 12/20/2024    1st. Tx. 12/20/24    Ect(bedside)      Other surgical history      scope knee    Other surgical history  11/29/2024    Aquablation of the prostate    Patient documented not to have experienced any of the following events N/A 05/09/2016    Procedure: COLONOSCOPY, POSSIBLE BIOPSY, POSSIBLE POLYPECTOMY 63297;  Surgeon: Shaun Mock MD;  Location: [facility]    Patient withough preoperative order for iv antibiotic surgical site infection prophylaxis. N/A 05/09/2016    Procedure: COLONOSCOPY, POSSIBLE BIOPSY, POSSIBLE POLYPECTOMY 68419;  Surgeon: Shaun Mock MD;  Location: [facility]    Special service or report  1990s    R knee arthroscopy lat meniscus       Family History:  Family History   Problem Relation Age of Onset    Hypertension Father     Diabetes Father     Lipids Brother     Cancer Brother         prostate ca    Other (Other[other])  Sister         ?sle       ROS:  ***    Physical Exam:   /77   Pulse 87   Temp 98.8 °F (37.1 °C) (Temporal)   Resp (!) 8   Ht 67\"   SpO2 96%   BMI 27.62 kg/m²     General Appearance:    Alert, cooperative, no distress, appears stated age   Head:    Normocephalic, without obvious abnormality, atraumatic   Eyes:    PERRL, conjunctiva/corneas clear, EOM's intact       Nose:   Nares normal, septum midline, mucosa normal, no drainage    or sinus tenderness   Throat:   Lips, mucosa, and tongue normal; teeth and gums normal   Neck:   Supple, symmetrical, trachea midline   Lungs:     Clear to auscultation bilaterally, respirations unlabored    Heart:    Regular rate and rhythm, S1 and S2 normal, no murmur, rub   or gallop   Abdomen:     Soft, non-tender, bowel sounds active all four quadrants,     no masses, no organomegaly   Extremities:   Extremities normal, atraumatic, no cyanosis or edema   Pulses:   2+ and symmetric all extremities   Skin:   Skin color, texture, turgor normal, no rashes or lesions   Neurologic:   CNII-XII intact, normal strength, sensation and reflexes     throughout     Impressions & Plans:  ***    I have discussed the risks and benefits and alternatives with the patient/family.  They understand and agree to proceed with plan of care.    Bryn Taylor MD

## 2025-05-28 ENCOUNTER — ANESTHESIA EVENT (OUTPATIENT)
Dept: POSTOP/PACU | Facility: HOSPITAL | Age: 75
End: 2025-05-28
Payer: MEDICARE

## 2025-05-28 ENCOUNTER — HOSPITAL ENCOUNTER (OUTPATIENT)
Dept: POSTOP/PACU | Facility: HOSPITAL | Age: 75
Discharge: HOME | End: 2025-05-28
Attending: Other
Payer: MEDICARE

## 2025-05-28 ENCOUNTER — ANESTHESIA (OUTPATIENT)
Dept: POSTOP/PACU | Facility: HOSPITAL | Age: 75
End: 2025-05-28
Payer: MEDICARE

## 2025-05-28 VITALS
DIASTOLIC BLOOD PRESSURE: 79 MMHG | RESPIRATION RATE: 19 BRPM | SYSTOLIC BLOOD PRESSURE: 138 MMHG | HEIGHT: 67 IN | OXYGEN SATURATION: 95 % | TEMPERATURE: 100 F | BODY MASS INDEX: 28 KG/M2 | HEART RATE: 89 BPM

## 2025-05-28 DIAGNOSIS — F33.2 SEVERE RECURRENT MAJOR DEPRESSION WITHOUT PSYCHOTIC FEATURES (HCC): ICD-10-CM

## 2025-05-28 RX ORDER — ONDANSETRON 2 MG/ML
4 INJECTION INTRAMUSCULAR; INTRAVENOUS EVERY 6 HOURS PRN
Status: DISCONTINUED | OUTPATIENT
Start: 2025-05-28 | End: 2025-05-30

## 2025-05-28 RX ORDER — SODIUM CHLORIDE, SODIUM LACTATE, POTASSIUM CHLORIDE, CALCIUM CHLORIDE 600; 310; 30; 20 MG/100ML; MG/100ML; MG/100ML; MG/100ML
INJECTION, SOLUTION INTRAVENOUS CONTINUOUS
Status: DISCONTINUED | OUTPATIENT
Start: 2025-05-28 | End: 2025-05-30

## 2025-05-28 RX ORDER — HYDROCODONE BITARTRATE AND ACETAMINOPHEN 5; 325 MG/1; MG/1
1 TABLET ORAL ONCE AS NEEDED
Status: ACTIVE | OUTPATIENT
Start: 2025-05-28 | End: 2025-05-28

## 2025-05-28 RX ORDER — METOCLOPRAMIDE HYDROCHLORIDE 5 MG/ML
10 INJECTION INTRAMUSCULAR; INTRAVENOUS EVERY 8 HOURS PRN
Status: DISCONTINUED | OUTPATIENT
Start: 2025-05-28 | End: 2025-05-30

## 2025-05-28 RX ORDER — HYDRALAZINE HYDROCHLORIDE 20 MG/ML
INJECTION INTRAMUSCULAR; INTRAVENOUS
Status: COMPLETED
Start: 2025-05-28 | End: 2025-05-28

## 2025-05-28 RX ORDER — LABETALOL HYDROCHLORIDE 5 MG/ML
5 INJECTION, SOLUTION INTRAVENOUS EVERY 5 MIN PRN
Status: ACTIVE | OUTPATIENT
Start: 2025-05-28 | End: 2025-05-28

## 2025-05-28 RX ORDER — LABETALOL HYDROCHLORIDE 5 MG/ML
INJECTION, SOLUTION INTRAVENOUS AS NEEDED
Status: DISCONTINUED | OUTPATIENT
Start: 2025-05-28 | End: 2025-05-28 | Stop reason: SURG

## 2025-05-28 RX ORDER — CAFFEINE AND SODIUM BENZOATE 125 MG/ML
750 INJECTION, SOLUTION INTRAMUSCULAR; INTRAVENOUS ONCE
Status: COMPLETED | OUTPATIENT
Start: 2025-05-28 | End: 2025-05-28

## 2025-05-28 RX ORDER — NALOXONE HYDROCHLORIDE 0.4 MG/ML
0.08 INJECTION, SOLUTION INTRAMUSCULAR; INTRAVENOUS; SUBCUTANEOUS AS NEEDED
Status: ACTIVE | OUTPATIENT
Start: 2025-05-28 | End: 2025-05-28

## 2025-05-28 RX ORDER — KETOROLAC TROMETHAMINE 30 MG/ML
15 INJECTION, SOLUTION INTRAMUSCULAR; INTRAVENOUS ONCE
Status: COMPLETED | OUTPATIENT
Start: 2025-05-28 | End: 2025-05-28

## 2025-05-28 RX ORDER — KETOROLAC TROMETHAMINE 30 MG/ML
INJECTION, SOLUTION INTRAMUSCULAR; INTRAVENOUS
Status: COMPLETED
Start: 2025-05-28 | End: 2025-05-28

## 2025-05-28 RX ORDER — HYDROMORPHONE HYDROCHLORIDE 1 MG/ML
0.6 INJECTION, SOLUTION INTRAMUSCULAR; INTRAVENOUS; SUBCUTANEOUS EVERY 5 MIN PRN
Status: ACTIVE | OUTPATIENT
Start: 2025-05-28 | End: 2025-05-28

## 2025-05-28 RX ORDER — ONDANSETRON 2 MG/ML
8 INJECTION INTRAMUSCULAR; INTRAVENOUS ONCE
Status: COMPLETED | OUTPATIENT
Start: 2025-05-28 | End: 2025-05-28

## 2025-05-28 RX ORDER — MIDAZOLAM HYDROCHLORIDE 1 MG/ML
1 INJECTION INTRAMUSCULAR; INTRAVENOUS EVERY 5 MIN PRN
Status: ACTIVE | OUTPATIENT
Start: 2025-05-28 | End: 2025-05-28

## 2025-05-28 RX ORDER — ONDANSETRON 2 MG/ML
INJECTION INTRAMUSCULAR; INTRAVENOUS
Status: COMPLETED
Start: 2025-05-28 | End: 2025-05-28

## 2025-05-28 RX ORDER — HYDRALAZINE HYDROCHLORIDE 20 MG/ML
10 INJECTION INTRAMUSCULAR; INTRAVENOUS ONCE
Status: COMPLETED | OUTPATIENT
Start: 2025-05-28 | End: 2025-05-28

## 2025-05-28 RX ORDER — CAFFEINE AND SODIUM BENZOATE 125 MG/ML
INJECTION, SOLUTION INTRAMUSCULAR; INTRAVENOUS
Status: COMPLETED
Start: 2025-05-28 | End: 2025-05-28

## 2025-05-28 RX ORDER — HYDROCODONE BITARTRATE AND ACETAMINOPHEN 5; 325 MG/1; MG/1
2 TABLET ORAL ONCE AS NEEDED
Status: ACTIVE | OUTPATIENT
Start: 2025-05-28 | End: 2025-05-28

## 2025-05-28 RX ORDER — ACETAMINOPHEN 500 MG
1000 TABLET ORAL ONCE AS NEEDED
Status: ACTIVE | OUTPATIENT
Start: 2025-05-28 | End: 2025-05-28

## 2025-05-28 RX ORDER — HYDROMORPHONE HYDROCHLORIDE 1 MG/ML
0.2 INJECTION, SOLUTION INTRAMUSCULAR; INTRAVENOUS; SUBCUTANEOUS EVERY 5 MIN PRN
Status: ACTIVE | OUTPATIENT
Start: 2025-05-28 | End: 2025-05-28

## 2025-05-28 RX ORDER — ETOMIDATE 2 MG/ML
INJECTION INTRAVENOUS AS NEEDED
Status: DISCONTINUED | OUTPATIENT
Start: 2025-05-28 | End: 2025-05-28 | Stop reason: SURG

## 2025-05-28 RX ORDER — HYDROMORPHONE HYDROCHLORIDE 1 MG/ML
0.4 INJECTION, SOLUTION INTRAMUSCULAR; INTRAVENOUS; SUBCUTANEOUS EVERY 5 MIN PRN
Status: ACTIVE | OUTPATIENT
Start: 2025-05-28 | End: 2025-05-28

## 2025-05-28 RX ORDER — KETAMINE HYDROCHLORIDE 50 MG/ML
INJECTION, SOLUTION INTRAMUSCULAR; INTRAVENOUS AS NEEDED
Status: DISCONTINUED | OUTPATIENT
Start: 2025-05-28 | End: 2025-05-28 | Stop reason: SURG

## 2025-05-28 RX ADMIN — KETAMINE HYDROCHLORIDE 25 MG: 50 INJECTION, SOLUTION INTRAMUSCULAR; INTRAVENOUS at 07:00:00

## 2025-05-28 RX ADMIN — KETOROLAC TROMETHAMINE 15 MG: 30 INJECTION, SOLUTION INTRAMUSCULAR; INTRAVENOUS at 06:21:00

## 2025-05-28 RX ADMIN — ETOMIDATE 14 MG: 2 INJECTION INTRAVENOUS at 07:00:00

## 2025-05-28 RX ADMIN — HYDRALAZINE HYDROCHLORIDE 10 MG: 20 INJECTION INTRAMUSCULAR; INTRAVENOUS at 06:23:00

## 2025-05-28 RX ADMIN — SODIUM CHLORIDE, SODIUM LACTATE, POTASSIUM CHLORIDE, CALCIUM CHLORIDE: 600; 310; 30; 20 INJECTION, SOLUTION INTRAVENOUS at 06:19:00

## 2025-05-28 RX ADMIN — SODIUM CHLORIDE, SODIUM LACTATE, POTASSIUM CHLORIDE, CALCIUM CHLORIDE: 600; 310; 30; 20 INJECTION, SOLUTION INTRAVENOUS at 07:05:00

## 2025-05-28 RX ADMIN — LABETALOL HYDROCHLORIDE 5 MG: 5 INJECTION, SOLUTION INTRAVENOUS at 06:59:00

## 2025-05-28 RX ADMIN — ONDANSETRON 8 MG: 2 INJECTION INTRAMUSCULAR; INTRAVENOUS at 06:28:00

## 2025-05-28 RX ADMIN — CAFFEINE AND SODIUM BENZOATE 750 MG: 125 INJECTION, SOLUTION INTRAMUSCULAR; INTRAVENOUS at 06:30:00

## 2025-05-28 NOTE — ANESTHESIA PREPROCEDURE EVALUATION
PRE-OP EVALUATION    Patient Name: Robe Nunez    Admit Diagnosis: Severe recurrent major depression without psychotic features ([facility]) [F33.2]    Pre-op Diagnosis: * No pre-op diagnosis entered *        Anesthesia Procedure: ECT(BEDSIDE)    * No surgeons found in log *    Pre-op vitals reviewed.  Temp: 98.1 °F (36.7 °C)  Pulse: 74  Resp: 16  BP: 131/76  SpO2: 96 %  Body mass index is 27.62 kg/m².    Current medications reviewed.  Hospital Medications:  Current Medications[1]    Outpatient Medications:   Prescriptions Prior to Admission[2]    Allergies: Other and Seasonal      Anesthesia Evaluation    Patient summary reviewed.    Anesthetic Complications  (-) history of anesthetic complications         GI/Hepatic/Renal    Negative GI/hepatic/renal ROS.                             Cardiovascular      ECG reviewed.  Exercise tolerance: good     MET: >4      (+) hypertension                                     Endo/Other                                  Pulmonary    Negative pulmonary ROS.                       Neuro/Psych      (+) depression                                Past Surgical History[3]  Social Hx on file[4]  History   Drug Use No     Available pre-op labs reviewed.  Lab Results   Component Value Date    WBC 6.6 03/23/2025    RBC 4.41 03/23/2025    HGB 12.8 (L) 03/23/2025    HCT 36.4 (L) 03/23/2025    MCV 82.5 03/23/2025    MCH 29.0 03/23/2025    MCHC 35.2 03/23/2025    RDW 12.2 03/23/2025    .0 03/23/2025     Lab Results   Component Value Date     03/23/2025    K 3.9 03/23/2025     03/23/2025    CO2 25.0 03/23/2025    BUN 12 03/23/2025    CREATSERUM 0.97 03/23/2025     (H) 03/23/2025    CA 10.0 03/23/2025              Airway      Mallampati: I      Neck ROM: limited Cardiovascular    Cardiovascular exam normal.         Dental    Dentition appears grossly intact         Pulmonary    Pulmonary exam normal.                 Other findings              ASA: 2   Plan: general  NPO  status verified and patient meets guidelines.    Post-procedure pain management plan discussed with surgeon and patient.      Plan/risks discussed with: patient                Present on Admission:  **None**             [1]    lactated ringers infusion   Intravenous Continuous    [COMPLETED] ketorolac (Toradol) 30 MG/ML injection 15 mg  15 mg Intravenous Once    [COMPLETED] ondansetron (Zofran) 4 MG/2ML injection 8 mg  8 mg Intravenous Once    [COMPLETED] caffeine-sodium benzoate 125-125 mg/mL injection  750 mg Intravenous Once    [COMPLETED] hydrALAzine (Apresoline) 20 mg/mL injection 10 mg  10 mg Intravenous Once   [2] (Not in a hospital admission)   [3]   Past Surgical History:  Procedure Laterality Date    Colonoscopy & polypectomy  05/09/2016    diverticulosis and a small polyp was removed; ASC    Colonoscopy,biopsy N/A 05/09/2016    Procedure: COLONOSCOPY, POSSIBLE BIOPSY, POSSIBLE POLYPECTOMY 49818;  Surgeon: Shaun Mock MD;  Location: [facility]    Ect provided Bilateral 12/20/2024    1st. Tx. 12/20/24    Ect(bedside)      Other surgical history      scope knee    Other surgical history  11/29/2024    Aquablation of the prostate    Patient documented not to have experienced any of the following events N/A 05/09/2016    Procedure: COLONOSCOPY, POSSIBLE BIOPSY, POSSIBLE POLYPECTOMY 54544;  Surgeon: Shaun Mock MD;  Location: [facility]    Patient withough preoperative order for iv antibiotic surgical site infection prophylaxis. N/A 05/09/2016    Procedure: COLONOSCOPY, POSSIBLE BIOPSY, POSSIBLE POLYPECTOMY 54861;  Surgeon: Shaun Mock MD;  Location: [facility]    Special service or report  1990s    R knee arthroscopy lat meniscus   [4]   Social History  Socioeconomic History    Marital status:    Tobacco Use    Smoking status: Never    Smokeless tobacco: Never   Vaping Use    Vaping status: Unknown   Substance and Sexual Activity    Alcohol use:  No     Alcohol/week: 0.0 - 1.0 standard drinks of alcohol     Comment: rare    Drug use: No    Sexual activity: Yes

## 2025-05-28 NOTE — ANESTHESIA POSTPROCEDURE EVALUATION
[facility]    Robe Nunez Patient Status:  Outpatient   Age/Gender 74 year old male MRN [id number]   Location [facility] POST ANESTHESIA CARE UNIT Attending Bryn Taylor MD   Hosp Day # 0 PCP Mango Boland MD       Anesthesia Post-op Note        Procedure Summary       Date: 05/28/25 Room / Location: [facility] Post Anesthesia Care Unit    Anesthesia Start: 0648 Anesthesia Stop: 0708    Procedure: ECT(BEDSIDE) Diagnosis: Severe recurrent major depression without psychotic features (HCC)    Scheduled Providers: Sahil Gilmore MD Anesthesiologist: Sahil Gilmore MD    Anesthesia Type: general ASA Status: 2            Anesthesia Type: general    Vitals Value Taken Time   /74 05/28/25 07:08   Temp 98 05/28/25 07:08   Pulse 62 05/28/25 07:08   Resp 12 05/28/25 07:08   SpO2 98 05/28/25 07:08           Patient Location: PACU    Anesthesia Type: general    Airway Patency: patent    Postop Pain Control: adequate    Mental Status: preanesthetic baseline    Nausea/Vomiting: none    Cardiopulmonary/Hydration status: stable euvolemic    Complications: no apparent anesthesia related complications    Postop vital signs: stable    Dental Exam: Unchanged from Preop    Patient to be discharged from PACU when criteria met.

## 2025-05-28 NOTE — PROGRESS NOTES
[facility] / [facility]  ECT Procedure Note    Robe Nunez Patient Status:  Outpatient   Age/Gender 74 year old male MRN [id number]   Location [facility] POST ANESTHESIA CARE UNIT Attending Bryn Taylor MD   Hosp Day # 0 PCP Mango Boland MD     2025    ECT Number: ***    Diagnosis: {EMIL ECT DIAGNOSIS NEW:7200}    Type of ECT:  {ECT TYPE:7199}    Place of Service:  {inpatient / outpatient:}    Settings:   1.  Energy Percentage: { ECT SETTINGS:4416}    2.  Program:  { ECT PROGRAM:4417}    Pre-ECT Evaluation    Symptoms:  ***    Risk/Benefits:  {[facility] ECT RISK/BENEFITS:4580}    Side Effects:  {[facility] ECT SIDE EFFECTS:4581}    Exam:  Mood: {LOMG MSE MOOD 2:7203}  Affect: {LOMG MSE 2 AFFECT:991638::\"Congruent\"}  Memory:  {LOMG MSE MEMORY:591462::\"intact immediate, recent, remote\"}  Concentration:   {:341318}  Suicidal ideation: {Suicidal ideation:868115}    Prior to procedure, reviewed with treatment team correct patient, time of procedure and type of ECT.  Also reviewed with anesthesia pre-ECT medications.    Patient Monitored:  { ECT MONITORIN::\"B/P, EKG, EEG, Pulse Ox, Left Ankle Cuff\"}    Pre-ECT Medications: {Pre-ECT medications:2931}    ECT Medications:  {ECT Medications:7202::\"Succinylcholine ***\"}    Seizure Duration:  Motor: *** seconds       EEG: *** seconds    Post-ECT Condition:  { POST ECT CONDITION:4424::\"Treatment unremarkable\"}    Post-ECT Medications:  { POST ECT MEDICATIONS:4425::\"Propofol ***\"}    Bryn Taylor MD           Medications: Toradol 15 mg IV, Hydralazine 10 mg IV 30 min prior to treatment, and Zofran 8 mg IV and hydralazine 750 mg IV    ECT Medications:  Labetalol 5 mg IV, Anesthetic  Etomidate 14 mg IV followed by ketamine 25 mg IV, and Succinylcholine 80 mg IV    Seizure Duration:  Motor: 38 seconds       EE seconds    Post-ECT Condition:  Treatment unremarkable    Post-ECT Medications:  None     Bryn Taylor MD

## 2025-05-28 NOTE — PROGRESS NOTES
[facility] / [facility]  ECT History & Physical    Robe Nunez Patient Status:  Outpatient   Age/Gender 74 year old male MRN [id number]   Location [facility] POST ANESTHESIA CARE UNIT Attending Bryn Taylor MD   Hosp Day # 0 PCP Mango Boland MD     Date: 5/28/2025    Diagnosis:  ***    Procedure:  ECT    HPI:     ***      Medical History:  Past Medical History:    Cancer (HCC)    Skin cancer    Depression    Headache disorder    High blood pressure    High cholesterol    HTN (hypertension)    Hyperlipidemia    Nonspecific elevation of levels of transaminase or lactic acid dehydrogenase (LDH)    s/p liver biopsy normal    Visual impairment    glasses       Surgical History:  Past Surgical History:   Procedure Laterality Date    Colonoscopy & polypectomy  05/09/2016    diverticulosis and a small polyp was removed; ASC    Colonoscopy,biopsy N/A 05/09/2016    Procedure: COLONOSCOPY, POSSIBLE BIOPSY, POSSIBLE POLYPECTOMY 71815;  Surgeon: Shaun Mock MD;  Location: [facility]    Ect provided Bilateral 12/20/2024    1st. Tx. 12/20/24    Ect(bedside)      Other surgical history      scope knee    Other surgical history  11/29/2024    Aquablation of the prostate    Patient documented not to have experienced any of the following events N/A 05/09/2016    Procedure: COLONOSCOPY, POSSIBLE BIOPSY, POSSIBLE POLYPECTOMY 99344;  Surgeon: Shaun Mock MD;  Location: [facility]    Patient withough preoperative order for iv antibiotic surgical site infection prophylaxis. N/A 05/09/2016    Procedure: COLONOSCOPY, POSSIBLE BIOPSY, POSSIBLE POLYPECTOMY 64147;  Surgeon: Shaun Mock MD;  Location: [facility]    Special service or report  1990s    R knee arthroscopy lat meniscus       Family History:  Family History   Problem Relation Age of Onset    Hypertension Father     Diabetes Father     Lipids Brother     Cancer Brother         prostate ca    Other (Other[other])  Sister         ?sle       ROS:  ***    Physical Exam:   /78   Pulse 95   Temp 99.6 °F (37.6 °C) (Temporal)   Resp 23   Ht 67\"   SpO2 96%   BMI 27.62 kg/m²     General Appearance:    Alert, cooperative, no distress, appears stated age   Head:    Normocephalic, without obvious abnormality, atraumatic   Eyes:    PERRL, conjunctiva/corneas clear, EOM's intact       Nose:   Nares normal, septum midline, mucosa normal, no drainage    or sinus tenderness   Throat:   Lips, mucosa, and tongue normal; teeth and gums normal   Neck:   Supple, symmetrical, trachea midline   Lungs:     Clear to auscultation bilaterally, respirations unlabored    Heart:    Regular rate and rhythm, S1 and S2 normal, no murmur, rub   or gallop   Abdomen:     Soft, non-tender, bowel sounds active all four quadrants,     no masses, no organomegaly   Extremities:   Extremities normal, atraumatic, no cyanosis or edema   Pulses:   2+ and symmetric all extremities   Skin:   Skin color, texture, turgor normal, no rashes or lesions   Neurologic:   CNII-XII intact, normal strength, sensation and reflexes     throughout     Impressions & Plans:  ***    I have discussed the risks and benefits and alternatives with the patient/family.  They understand and agree to proceed with plan of care.    Bryn Taylor MD

## 2025-05-28 NOTE — DISCHARGE INSTRUCTIONS
Discharge Instructions  Electroconvulsive Therapy    Activities:  You MUST arrange to have a responsible adult drive you home and have a responsible adult stay with you the rest of the day and overnight.  Do not drive today.  Do not operate any machinery today. Use kitchen equipment with caution.  Rest and take it easy today.  Do not take public transportation without the presence of another responsible adult for 24 hours    Medications:  Resume your regular medications when you get home  The nurse will instruct you not to take any NSAIDS (Advil, Aleve, Motrin, Ibuprofen) before 1 pm today because you were given a certain medication during the procedure.    Diet:  You may resume your regular diet when you get home  Do not drink alcohol for 24 hours    Additional Instructions:  Someone should call you to schedule any upcoming ECT treatments. Call as needed to schedule or cancel your ECT appointments [phone].  If you have any questions or concerns, please call your own psychiatrist.  For your safety, please do not wear make-up to any future ECT appointments.  For any questions regarding your ECT appointment, please call [facility] [phone].  For cancellations after hours, call [phone] and leave a message.    Expected Recovery:  As you awaken, you may experience one or more of the following:  Headache, nausea, temporary confusion, or muscle stiffness.  If these symptoms increase, become severe or are accompanied by a fever of more than 101, please seek medical attention.  The ECT may affect memory.  Many patients report loss of memory for events that occurred in the days, weeks or months surrounding the ECT.  Many of these memories may return, but not always.  Short-term memory may also be affected for months, but this can also be a result of the disorder that you have.   Lab: [facility] Patient Will Remove Sutures At Home?: No Hemostasis: Aluminum Chloride Punch Size In Mm: 5 X Depth Of Punch In Cm (Optional): 0 Epidermal Sutures: 4-0 Ethilon Notification Instructions: Patient will be notified of biopsy results. However, patient instructed to call the office if not contacted within 2 weeks. Biopsy Type: H and E Wound Care: Vaseline Consent: The provider's intent is to obtain a tissue sample solely for diagnostic purposes. Written consent to obtain tissue sample was obtained and risks were reviewed including but not limited to scarring, infection, bleeding, scabbing, incomplete removal, nerve damage and allergy to anesthesia. Anesthesia Volume In Cc (Will Not Render If 0): 1 Home Suture Removal Text: Patient was provided a home suture removal kit and will remove their sutures at home. If they have any questions or difficulties they will call the office. Was A Bandage Applied: Yes Lab Facility: [facility] Post-Care Instructions: I reviewed with the patient in detail post-care instructions. Patient is to keep the biopsy site dry overnight, and then apply bacitracin twice daily until healed. Patient may apply hydrogen peroxide soaks to remove any crusting. Detail Level: Detailed Dressing: Band-Aid Billing Type: [employer] Anesthesia Type: 1% lidocaine with epinephrine

## 2025-06-18 ENCOUNTER — ANESTHESIA (OUTPATIENT)
Dept: POSTOP/PACU | Facility: HOSPITAL | Age: 75
End: 2025-06-18
Payer: MEDICARE

## 2025-06-18 ENCOUNTER — HOSPITAL ENCOUNTER (OUTPATIENT)
Dept: POSTOP/PACU | Facility: HOSPITAL | Age: 75
Discharge: HOME | End: 2025-06-18
Attending: Other
Payer: MEDICARE

## 2025-06-18 ENCOUNTER — ANESTHESIA EVENT (OUTPATIENT)
Dept: POSTOP/PACU | Facility: HOSPITAL | Age: 75
End: 2025-06-18
Payer: MEDICARE

## 2025-06-18 VITALS
BODY MASS INDEX: 28 KG/M2 | HEART RATE: 82 BPM | DIASTOLIC BLOOD PRESSURE: 76 MMHG | RESPIRATION RATE: 12 BRPM | SYSTOLIC BLOOD PRESSURE: 143 MMHG | OXYGEN SATURATION: 98 % | HEIGHT: 67 IN | TEMPERATURE: 98 F

## 2025-06-18 DIAGNOSIS — F33.2 SEVERE RECURRENT MAJOR DEPRESSION WITHOUT PSYCHOTIC FEATURES (HCC): ICD-10-CM

## 2025-06-18 RX ORDER — SODIUM CHLORIDE, SODIUM LACTATE, POTASSIUM CHLORIDE, CALCIUM CHLORIDE 600; 310; 30; 20 MG/100ML; MG/100ML; MG/100ML; MG/100ML
INJECTION, SOLUTION INTRAVENOUS CONTINUOUS
Status: DISCONTINUED | OUTPATIENT
Start: 2025-06-18 | End: 2025-06-20

## 2025-06-18 RX ORDER — HYDROMORPHONE HYDROCHLORIDE 1 MG/ML
0.2 INJECTION, SOLUTION INTRAMUSCULAR; INTRAVENOUS; SUBCUTANEOUS EVERY 5 MIN PRN
Status: ACTIVE | OUTPATIENT
Start: 2025-06-18 | End: 2025-06-18

## 2025-06-18 RX ORDER — CAFFEINE AND SODIUM BENZOATE 125 MG/ML
750 INJECTION, SOLUTION INTRAMUSCULAR; INTRAVENOUS ONCE
Status: COMPLETED | OUTPATIENT
Start: 2025-06-18 | End: 2025-06-18

## 2025-06-18 RX ORDER — KETAMINE HYDROCHLORIDE 50 MG/ML
INJECTION, SOLUTION INTRAMUSCULAR; INTRAVENOUS
Status: COMPLETED
Start: 2025-06-18 | End: 2025-06-18

## 2025-06-18 RX ORDER — LABETALOL HYDROCHLORIDE 5 MG/ML
INJECTION, SOLUTION INTRAVENOUS AS NEEDED
Status: DISCONTINUED | OUTPATIENT
Start: 2025-06-18 | End: 2025-06-18 | Stop reason: SURG

## 2025-06-18 RX ORDER — ONDANSETRON 2 MG/ML
8 INJECTION INTRAMUSCULAR; INTRAVENOUS ONCE
Status: COMPLETED | OUTPATIENT
Start: 2025-06-18 | End: 2025-06-18

## 2025-06-18 RX ORDER — CAFFEINE AND SODIUM BENZOATE 125 MG/ML
INJECTION, SOLUTION INTRAMUSCULAR; INTRAVENOUS
Status: COMPLETED
Start: 2025-06-18 | End: 2025-06-18

## 2025-06-18 RX ORDER — ONDANSETRON 2 MG/ML
INJECTION INTRAMUSCULAR; INTRAVENOUS
Status: COMPLETED
Start: 2025-06-18 | End: 2025-06-18

## 2025-06-18 RX ORDER — KETOROLAC TROMETHAMINE 30 MG/ML
INJECTION, SOLUTION INTRAMUSCULAR; INTRAVENOUS
Status: COMPLETED
Start: 2025-06-18 | End: 2025-06-18

## 2025-06-18 RX ORDER — KETOROLAC TROMETHAMINE 30 MG/ML
15 INJECTION, SOLUTION INTRAMUSCULAR; INTRAVENOUS ONCE
Status: COMPLETED | OUTPATIENT
Start: 2025-06-18 | End: 2025-06-18

## 2025-06-18 RX ORDER — KETAMINE HYDROCHLORIDE 50 MG/ML
INJECTION, SOLUTION INTRAMUSCULAR; INTRAVENOUS AS NEEDED
Status: DISCONTINUED | OUTPATIENT
Start: 2025-06-18 | End: 2025-06-18 | Stop reason: SURG

## 2025-06-18 RX ORDER — HYDRALAZINE HYDROCHLORIDE 20 MG/ML
10 INJECTION INTRAMUSCULAR; INTRAVENOUS ONCE
Status: COMPLETED | OUTPATIENT
Start: 2025-06-18 | End: 2025-06-18

## 2025-06-18 RX ORDER — NALOXONE HYDROCHLORIDE 0.4 MG/ML
0.08 INJECTION, SOLUTION INTRAMUSCULAR; INTRAVENOUS; SUBCUTANEOUS AS NEEDED
Status: ACTIVE | OUTPATIENT
Start: 2025-06-18 | End: 2025-06-18

## 2025-06-18 RX ORDER — HYDROMORPHONE HYDROCHLORIDE 1 MG/ML
0.6 INJECTION, SOLUTION INTRAMUSCULAR; INTRAVENOUS; SUBCUTANEOUS EVERY 5 MIN PRN
Status: ACTIVE | OUTPATIENT
Start: 2025-06-18 | End: 2025-06-18

## 2025-06-18 RX ORDER — HYDROMORPHONE HYDROCHLORIDE 1 MG/ML
0.4 INJECTION, SOLUTION INTRAMUSCULAR; INTRAVENOUS; SUBCUTANEOUS EVERY 5 MIN PRN
Status: ACTIVE | OUTPATIENT
Start: 2025-06-18 | End: 2025-06-18

## 2025-06-18 RX ORDER — HYDRALAZINE HYDROCHLORIDE 20 MG/ML
INJECTION INTRAMUSCULAR; INTRAVENOUS
Status: COMPLETED
Start: 2025-06-18 | End: 2025-06-18

## 2025-06-18 RX ORDER — ETOMIDATE 2 MG/ML
INJECTION INTRAVENOUS AS NEEDED
Status: DISCONTINUED | OUTPATIENT
Start: 2025-06-18 | End: 2025-06-18 | Stop reason: SURG

## 2025-06-18 RX ADMIN — SODIUM CHLORIDE, SODIUM LACTATE, POTASSIUM CHLORIDE, CALCIUM CHLORIDE: 600; 310; 30; 20 INJECTION, SOLUTION INTRAVENOUS at 06:59:00

## 2025-06-18 RX ADMIN — CAFFEINE AND SODIUM BENZOATE 750 MG: 125 INJECTION, SOLUTION INTRAMUSCULAR; INTRAVENOUS at 06:21:00

## 2025-06-18 RX ADMIN — LABETALOL HYDROCHLORIDE 5 MG: 5 INJECTION, SOLUTION INTRAVENOUS at 06:49:00

## 2025-06-18 RX ADMIN — HYDRALAZINE HYDROCHLORIDE 10 MG: 20 INJECTION INTRAMUSCULAR; INTRAVENOUS at 06:06:00

## 2025-06-18 RX ADMIN — SODIUM CHLORIDE, SODIUM LACTATE, POTASSIUM CHLORIDE, CALCIUM CHLORIDE: 600; 310; 30; 20 INJECTION, SOLUTION INTRAVENOUS at 06:32:00

## 2025-06-18 RX ADMIN — KETOROLAC TROMETHAMINE 15 MG: 30 INJECTION, SOLUTION INTRAMUSCULAR; INTRAVENOUS at 06:01:00

## 2025-06-18 RX ADMIN — SODIUM CHLORIDE, SODIUM LACTATE, POTASSIUM CHLORIDE, CALCIUM CHLORIDE: 600; 310; 30; 20 INJECTION, SOLUTION INTRAVENOUS at 06:00:00

## 2025-06-18 RX ADMIN — ONDANSETRON 8 MG: 2 INJECTION INTRAMUSCULAR; INTRAVENOUS at 06:00:00

## 2025-06-18 RX ADMIN — KETAMINE HYDROCHLORIDE 25 MG: 50 INJECTION, SOLUTION INTRAMUSCULAR; INTRAVENOUS at 06:49:00

## 2025-06-18 RX ADMIN — ETOMIDATE 14 MG: 2 INJECTION INTRAVENOUS at 06:49:00

## 2025-06-18 RX ADMIN — SODIUM CHLORIDE, SODIUM LACTATE, POTASSIUM CHLORIDE, CALCIUM CHLORIDE: 600; 310; 30; 20 INJECTION, SOLUTION INTRAVENOUS at 07:38:00

## 2025-06-18 NOTE — PROGRESS NOTES
[facility] / [facility]  ECT History & Physical    Robe Nunez Patient Status:  Outpatient   Age/Gender 75 year old male MRN [id number]   Location [facility] POST ANESTHESIA CARE UNIT Attending Bryn Taylor MD   Hosp Day # 0 PCP Mango Boland MD     Date: 6/18/2025    Diagnosis: Major depression recurrent severe    Procedure:  ECT    HPI:     Patient seen.  Patient reports no cognitive or physical complaints      Medical History:  Past Medical History:    Cancer (HCC)    Skin cancer    Depression    Headache disorder    High blood pressure    High cholesterol    HTN (hypertension)    Hyperlipidemia    Nonspecific elevation of levels of transaminase or lactic acid dehydrogenase (LDH)    s/p liver biopsy normal    Visual impairment    glasses       Surgical History:  Past Surgical History:   Procedure Laterality Date    Colonoscopy & polypectomy  05/09/2016    diverticulosis and a small polyp was removed; ASC    Colonoscopy,biopsy N/A 05/09/2016    Procedure: COLONOSCOPY, POSSIBLE BIOPSY, POSSIBLE POLYPECTOMY 08348;  Surgeon: Shaun Mock MD;  Location: [facility]    Ect provided Bilateral 12/20/2024    1st. Tx. 12/20/24    Ect(bedside)      Other surgical history      scope knee    Other surgical history  11/29/2024    Aquablation of the prostate    Patient documented not to have experienced any of the following events N/A 05/09/2016    Procedure: COLONOSCOPY, POSSIBLE BIOPSY, POSSIBLE POLYPECTOMY 64042;  Surgeon: Shaun Mock MD;  Location: [facility]    Patient withough preoperative order for iv antibiotic surgical site infection prophylaxis. N/A 05/09/2016    Procedure: COLONOSCOPY, POSSIBLE BIOPSY, POSSIBLE POLYPECTOMY 62552;  Surgeon: Shaun Mock MD;  Location: [facility]    Special service or report  1990s    R knee arthroscopy lat meniscus       Family History:  Family History   Problem Relation Age of Onset    Hypertension Father      Diabetes Father     Lipids Brother     Cancer Brother         prostate ca    Other (Other[other]) Sister         ?sle       ROS:  Unremarkable    Physical Exam:   /76   Pulse 82   Temp 98 °F (36.7 °C)   Resp 12   Ht 67\"   SpO2 98%   BMI 27.62 kg/m²     General Appearance:    Alert, cooperative, no distress, appears stated age   Head:    Normocephalic, without obvious abnormality, atraumatic   Eyes:    PERRL, conjunctiva/corneas clear, EOM's intact       Nose:   Nares normal, septum midline, mucosa normal, no drainage    or sinus tenderness   Throat:   Lips, mucosa, and tongue normal; teeth and gums normal   Neck:   Supple, symmetrical, trachea midline   Lungs:     Clear to auscultation bilaterally, respirations unlabored    Heart:    Regular rate and rhythm, S1 and S2 normal, no murmur, rub   or gallop   Abdomen:     Soft, non-tender, bowel sounds active all four quadrants,     no masses, no organomegaly   Extremities:   Extremities normal, atraumatic, no cyanosis or edema   Pulses:   2+ and symmetric all extremities   Skin:   Skin color, texture, turgor normal, no rashes or lesions   Neurologic:   CNII-XII intact, normal strength, sensation and reflexes     throughout     Impressions & Plans: Major depression recurrent severe.  Bitemporal ECT    I have discussed the risks and benefits and alternatives with the patient/family.  They understand and agree to proceed with plan of care.    Bryn Taylor MD

## 2025-06-18 NOTE — ANESTHESIA PREPROCEDURE EVALUATION
PRE-OP EVALUATION    Patient Name: Robe Nunez    Admit Diagnosis: Severe recurrent major depression without psychotic features ([facility]) [F33.2]    Pre-op Diagnosis: * No pre-op diagnosis entered *        Anesthesia Procedure: ECT(BEDSIDE)    * No surgeons found in log *    Pre-op vitals reviewed.  Temp: 97.7 °F (36.5 °C)  Pulse: 85  Resp: 14  BP: 115/77  SpO2: 96 %  Body mass index is 27.62 kg/m².    Current medications reviewed.  Hospital Medications:  Current Medications[1]    Outpatient Medications:   Prescriptions Prior to Admission[2]    Allergies: Other and Seasonal      Anesthesia Evaluation    Patient summary reviewed.    Anesthetic Complications  (-) history of anesthetic complications         GI/Hepatic/Renal    Negative GI/hepatic/renal ROS.                             Cardiovascular      ECG reviewed.  Exercise tolerance: good     MET: >4      (+) hypertension                                     Endo/Other                                  Pulmonary    Negative pulmonary ROS.                       Neuro/Psych      (+) depression                                Past Surgical History[3]  Social Hx on file[4]  History   Drug Use No     Available pre-op labs reviewed.  Lab Results   Component Value Date    WBC 6.6 03/23/2025    RBC 4.41 03/23/2025    HGB 12.8 (L) 03/23/2025    HCT 36.4 (L) 03/23/2025    MCV 82.5 03/23/2025    MCH 29.0 03/23/2025    MCHC 35.2 03/23/2025    RDW 12.2 03/23/2025    .0 03/23/2025     Lab Results   Component Value Date     03/23/2025    K 3.9 03/23/2025     03/23/2025    CO2 25.0 03/23/2025    BUN 12 03/23/2025    CREATSERUM 0.97 03/23/2025     (H) 03/23/2025    CA 10.0 03/23/2025              Airway      Mallampati: I      Neck ROM: limited Cardiovascular    Cardiovascular exam normal.         Dental    Dentition appears grossly intact         Pulmonary    Pulmonary exam normal.                 Other findings              ASA: 2   Plan: general  NPO  status verified and patient meets guidelines.    Post-procedure pain management plan discussed with surgeon and patient.      Plan/risks discussed with: patient                Present on Admission:  **None**             [1]    lactated ringers infusion   Intravenous Continuous    [COMPLETED] ketorolac (Toradol) 30 MG/ML injection 15 mg  15 mg Intravenous Once    [COMPLETED] ondansetron (Zofran) 4 MG/2ML injection 8 mg  8 mg Intravenous Once    [COMPLETED] caffeine-sodium benzoate 125-125 mg/mL injection  750 mg Intravenous Once    [COMPLETED] hydrALAzine (Apresoline) 20 mg/mL injection 10 mg  10 mg Intravenous Once    [COMPLETED] ondansetron (Zofran) 4 MG/2ML injection        [COMPLETED] ketorolac (Toradol) 30 MG/ML injection        [COMPLETED] caffeine-sodium benzoate 125-125 MG/ML injection        [COMPLETED] hydrALAzine (Apresoline) 20 mg/mL injection        [COMPLETED] ketamine (Ketalar) 50 MG/ML injection       [2] (Not in a hospital admission)   [3]   Past Surgical History:  Procedure Laterality Date    Colonoscopy & polypectomy  05/09/2016    diverticulosis and a small polyp was removed; ASC    Colonoscopy,biopsy N/A 05/09/2016    Procedure: COLONOSCOPY, POSSIBLE BIOPSY, POSSIBLE POLYPECTOMY 50035;  Surgeon: Shaun Mock MD;  Location: [facility]    Ect provided Bilateral 12/20/2024    1st. Tx. 12/20/24    Ect(bedside)      Other surgical history      scope knee    Other surgical history  11/29/2024    Aquablation of the prostate    Patient documented not to have experienced any of the following events N/A 05/09/2016    Procedure: COLONOSCOPY, POSSIBLE BIOPSY, POSSIBLE POLYPECTOMY 03220;  Surgeon: Shaun Mock MD;  Location: [facility]    Patient withough preoperative order for iv antibiotic surgical site infection prophylaxis. N/A 05/09/2016    Procedure: COLONOSCOPY, POSSIBLE BIOPSY, POSSIBLE POLYPECTOMY 78493;  Surgeon: Shaun Mock MD;  Location: [facility]  [place]    Special service or report  1990s    R knee arthroscopy lat meniscus   [4]   Social History  Socioeconomic History    Marital status:    Tobacco Use    Smoking status: Never    Smokeless tobacco: Never   Vaping Use    Vaping status: Unknown   Substance and Sexual Activity    Alcohol use: No     Alcohol/week: 0.0 - 1.0 standard drinks of alcohol     Comment: rare    Drug use: No    Sexual activity: Yes

## 2025-06-18 NOTE — ANESTHESIA POSTPROCEDURE EVALUATION
[facility]    Robe Nunez Patient Status:  Outpatient   Age/Gender 75 year old male MRN [id number]   Location [facility] POST ANESTHESIA CARE UNIT Attending Bryn Taylor MD   Hosp Day # 0 PCP Mango Boland MD       Anesthesia Post-op Note        Procedure Summary       Date: 06/18/25 Room / Location: [facility] Post Anesthesia Care Unit    Anesthesia Start: 0648 Anesthesia Stop: 0659    Procedure: ECT(BEDSIDE) Diagnosis: Severe recurrent major depression without psychotic features (HCC)    Scheduled Providers:  Anesthesiologist: Wiliam Rocha MD    Anesthesia Type: general ASA Status: 2            Anesthesia Type: general    Vitals Value Taken Time   /82 06/18/25 06:59   Temp 97.2 °F (36.2 °C) 06/18/25 06:59   Pulse 65 06/18/25 06:59   Resp 16 06/18/25 06:59   SpO2 95 % 06/18/25 06:59           Patient Location: PACU    Anesthesia Type: general    Airway Patency: patent    Postop Pain Control: adequate    Mental Status: mildly sedated but able to meaningfully participate in the post-anesthesia evaluation    Nausea/Vomiting: none    Cardiopulmonary/Hydration status: stable euvolemic    Complications: no apparent anesthesia related complications    Postop vital signs: stable    Dental Exam: Unchanged from Preop

## 2025-06-18 NOTE — PROGRESS NOTES
[facility]  ECT Procedure Note    Robe Nunez Patient Status:  Outpatient   Age/Gender 75 year old male MRN [id number]   Location [facility] POST ANESTHESIA CARE UNIT Attending Bryn Taylor MD   Hosp Day # 0 PCP Mango Boland MD     6/18/2025    ECT Number: #25 total.  #13 in series    Diagnosis: Major Depression Recurrent Severe Without Psychotic Features. F33.2    Type of ECT:  Bitemporal    Place of Service:  Outpatient    Settings:   1.  Energy Percentage: 100%    2.  Program:  DGX    Pre-ECT Evaluation    Symptoms:  Patient seen.  Patient noted to have been doing very well.  Patient noted to have significant stabilization of mood.  Patient noted to have a significant decrease in anxiety and obsessional thinking.  No pato or psychosis.  No suicidal thoughts.  Patient feels that he can decrease frequency of ECT.  We discussed risks and benefits of longer term maintenance.  Patient reports no cognitive or physical complaints.  Patient shows capacity to make decisions.  Discussed risks and benefits.  Plan on ECT in 4 weeks and reevaluate.    Risk/Benefits:  Discussed with patient side effects of ECT including headache, teeth, jaw, cardiac, pulmonary, NPO, aspiration, allergic reactions, anesthetic reactions, musculoskeletal, neurologic, morbidity/mortality, potential lack of efficacy, unilateral/bilateral ECT, relapse/maintenance issues, cognitive risks including memory, concentration, cognition, and other risks.    Side Effects:  Patient notes no cognitive/physical complaints    Exam:  Mood: less depressed, less anxious, and approaching baseline  Affect: Congruent  Memory:  intact immediate, recent, remote and as evidenced by ability to present consistent history  Concentration:   good  Suicidal ideation: no suicidal ideation    Prior to procedure, reviewed with treatment team correct patient, time of procedure and type of ECT.  Also reviewed with anesthesia pre-ECT  medications.    Patient Monitored:  B/P, EKG, EEG, Pulse Ox, Left Ankle Cuff    Pre-ECT Medications: Toradol 15 mg IV, Hydralazine 10 mg IV 30 min prior to treatment, and Zofran 8 mg IV and caffeine 750 mg IV    ECT Medications:  Labetalol 5 mg IV, Anesthetic  Etomidate 14 mg IV followed by ketamine 25 mg IV, and Succinylcholine 80 mg IV    Seizure Duration:  Motor: 15 seconds       EE seconds    Post-ECT Condition:  Treatment unremarkable    Post-ECT Medications:  None     Bryn Taylor MD

## 2025-06-18 NOTE — DISCHARGE INSTRUCTIONS
Discharge Instructions  Electroconvulsive Therapy    Activities:  You MUST arrange to have a responsible adult drive you home and have a responsible adult stay with you the rest of the day and overnight.  Do not drive today.  Do not operate any machinery today. Use kitchen equipment with caution.  Rest and take it easy today.  Do not take public transportation without the presence of another responsible adult for 24 hours    Medications:  Resume your regular medications when you get home  The nurse will instruct you not to take any NSAIDS (Advil, Aleve, Motrin, Ibuprofen) before 1 pm today because you were given a certain medication during the procedure.      Diet:  You may resume your regular diet when you get home  Do not drink alcohol for 24 hours    Additional Instructions:  Someone should call you to schedule any upcoming ECT treatments. Call as needed to schedule or cancel your ECT appointments [phone].  If you have any questions or concerns, please call your own psychiatrist.  For your safety, please do not wear make-up to any future ECT appointments.  For any questions regarding your ECT appointment, please call [facility] [phone].  For cancellations after hours, call [phone] and leave a message.    Expected Recovery:  As you awaken, you may experience one or more of the following:  Headache, nausea, temporary confusion, or muscle stiffness.  If these symptoms increase, become severe or are accompanied by a fever of more than 101, please seek medical attention.  The ECT may affect memory.  Many patients report loss of memory for events that occurred in the days, weeks or months surrounding the ECT.  Many of these memories may return, but not always.  Short-term memory may also be affected for months, but this can also be a result of the disorder that you have.

## 2025-07-23 ENCOUNTER — ANESTHESIA EVENT (OUTPATIENT)
Dept: POSTOP/PACU | Facility: HOSPITAL | Age: 75
End: 2025-07-23
Payer: MEDICARE

## 2025-07-23 ENCOUNTER — HOSPITAL ENCOUNTER (OUTPATIENT)
Dept: POSTOP/PACU | Facility: HOSPITAL | Age: 75
Discharge: HOME | End: 2025-07-23
Attending: Other

## 2025-07-23 ENCOUNTER — ANESTHESIA (OUTPATIENT)
Dept: POSTOP/PACU | Facility: HOSPITAL | Age: 75
End: 2025-07-23
Payer: MEDICARE

## 2025-07-23 VITALS
OXYGEN SATURATION: 99 % | HEIGHT: 67 IN | BODY MASS INDEX: 28 KG/M2 | SYSTOLIC BLOOD PRESSURE: 144 MMHG | TEMPERATURE: 98 F | HEART RATE: 91 BPM | RESPIRATION RATE: 16 BRPM | DIASTOLIC BLOOD PRESSURE: 76 MMHG

## 2025-07-23 DIAGNOSIS — F33.2 SEVERE RECURRENT MAJOR DEPRESSION WITHOUT PSYCHOTIC FEATURES (HCC): ICD-10-CM

## 2025-07-23 RX ORDER — KETAMINE HYDROCHLORIDE 50 MG/ML
INJECTION, SOLUTION INTRAMUSCULAR; INTRAVENOUS
Status: COMPLETED
Start: 2025-07-23 | End: 2025-07-23

## 2025-07-23 RX ORDER — HYDRALAZINE HYDROCHLORIDE 20 MG/ML
10 INJECTION INTRAMUSCULAR; INTRAVENOUS ONCE
Status: COMPLETED | OUTPATIENT
Start: 2025-07-23 | End: 2025-07-23

## 2025-07-23 RX ORDER — CAFFEINE AND SODIUM BENZOATE 125 MG/ML
750 INJECTION, SOLUTION INTRAMUSCULAR; INTRAVENOUS ONCE
Status: COMPLETED | OUTPATIENT
Start: 2025-07-23 | End: 2025-07-23

## 2025-07-23 RX ORDER — ACETAMINOPHEN 500 MG
1000 TABLET ORAL ONCE AS NEEDED
Status: ACTIVE | OUTPATIENT
Start: 2025-07-23 | End: 2025-07-23

## 2025-07-23 RX ORDER — ONDANSETRON 2 MG/ML
8 INJECTION INTRAMUSCULAR; INTRAVENOUS ONCE
Status: COMPLETED | OUTPATIENT
Start: 2025-07-23 | End: 2025-07-23

## 2025-07-23 RX ORDER — HYDRALAZINE HYDROCHLORIDE 20 MG/ML
INJECTION INTRAMUSCULAR; INTRAVENOUS
Status: COMPLETED
Start: 2025-07-23 | End: 2025-07-23

## 2025-07-23 RX ORDER — MIDAZOLAM HYDROCHLORIDE 1 MG/ML
1 INJECTION INTRAMUSCULAR; INTRAVENOUS EVERY 5 MIN PRN
Status: ACTIVE | OUTPATIENT
Start: 2025-07-23 | End: 2025-07-23

## 2025-07-23 RX ORDER — LABETALOL HYDROCHLORIDE 5 MG/ML
INJECTION, SOLUTION INTRAVENOUS AS NEEDED
Status: DISCONTINUED | OUTPATIENT
Start: 2025-07-23 | End: 2025-07-23 | Stop reason: SURG

## 2025-07-23 RX ORDER — ETOMIDATE 2 MG/ML
INJECTION INTRAVENOUS AS NEEDED
Status: DISCONTINUED | OUTPATIENT
Start: 2025-07-23 | End: 2025-07-23 | Stop reason: SURG

## 2025-07-23 RX ORDER — KETOROLAC TROMETHAMINE 30 MG/ML
15 INJECTION, SOLUTION INTRAMUSCULAR; INTRAVENOUS ONCE
Status: COMPLETED | OUTPATIENT
Start: 2025-07-23 | End: 2025-07-23

## 2025-07-23 RX ORDER — SODIUM CHLORIDE, SODIUM LACTATE, POTASSIUM CHLORIDE, CALCIUM CHLORIDE 600; 310; 30; 20 MG/100ML; MG/100ML; MG/100ML; MG/100ML
INJECTION, SOLUTION INTRAVENOUS CONTINUOUS
Status: DISCONTINUED | OUTPATIENT
Start: 2025-07-23 | End: 2025-07-25

## 2025-07-23 RX ORDER — KETOROLAC TROMETHAMINE 30 MG/ML
INJECTION, SOLUTION INTRAMUSCULAR; INTRAVENOUS
Status: COMPLETED
Start: 2025-07-23 | End: 2025-07-23

## 2025-07-23 RX ORDER — CAFFEINE AND SODIUM BENZOATE 125 MG/ML
INJECTION, SOLUTION INTRAMUSCULAR; INTRAVENOUS
Status: COMPLETED
Start: 2025-07-23 | End: 2025-07-23

## 2025-07-23 RX ORDER — ONDANSETRON 2 MG/ML
INJECTION INTRAMUSCULAR; INTRAVENOUS
Status: COMPLETED
Start: 2025-07-23 | End: 2025-07-23

## 2025-07-23 RX ORDER — KETAMINE HYDROCHLORIDE 50 MG/ML
INJECTION, SOLUTION INTRAMUSCULAR; INTRAVENOUS AS NEEDED
Status: DISCONTINUED | OUTPATIENT
Start: 2025-07-23 | End: 2025-07-23 | Stop reason: SURG

## 2025-07-23 RX ORDER — NALOXONE HYDROCHLORIDE 0.4 MG/ML
0.08 INJECTION, SOLUTION INTRAMUSCULAR; INTRAVENOUS; SUBCUTANEOUS AS NEEDED
Status: ACTIVE | OUTPATIENT
Start: 2025-07-23 | End: 2025-07-23

## 2025-07-23 RX ADMIN — HYDRALAZINE HYDROCHLORIDE 10 MG: 20 INJECTION INTRAMUSCULAR; INTRAVENOUS at 07:06:00

## 2025-07-23 RX ADMIN — SODIUM CHLORIDE, SODIUM LACTATE, POTASSIUM CHLORIDE, CALCIUM CHLORIDE: 600; 310; 30; 20 INJECTION, SOLUTION INTRAVENOUS at 07:21:00

## 2025-07-23 RX ADMIN — KETOROLAC TROMETHAMINE 15 MG: 30 INJECTION, SOLUTION INTRAMUSCULAR; INTRAVENOUS at 06:17:00

## 2025-07-23 RX ADMIN — SODIUM CHLORIDE, SODIUM LACTATE, POTASSIUM CHLORIDE, CALCIUM CHLORIDE: 600; 310; 30; 20 INJECTION, SOLUTION INTRAVENOUS at 06:17:00

## 2025-07-23 RX ADMIN — ONDANSETRON 8 MG: 2 INJECTION INTRAMUSCULAR; INTRAVENOUS at 06:17:00

## 2025-07-23 RX ADMIN — KETAMINE HYDROCHLORIDE 25 MG: 50 INJECTION, SOLUTION INTRAMUSCULAR; INTRAVENOUS at 07:16:00

## 2025-07-23 RX ADMIN — ETOMIDATE 14 MG: 2 INJECTION INTRAVENOUS at 07:16:00

## 2025-07-23 RX ADMIN — CAFFEINE AND SODIUM BENZOATE 750 MG: 125 INJECTION, SOLUTION INTRAMUSCULAR; INTRAVENOUS at 06:30:00

## 2025-07-23 RX ADMIN — LABETALOL HYDROCHLORIDE 5 MG: 5 INJECTION, SOLUTION INTRAVENOUS at 07:16:00

## 2025-07-23 NOTE — PROGRESS NOTES
[facility]  ECT History & Physical    Robe Nunez Patient Status:  Outpatient   Age/Gender 75 year old male MRN [id number]   Location [facility] POST ANESTHESIA CARE UNIT Attending Bryn Taylor MD   Hosp Day # 0 PCP Mango Boland MD     Date: 7/23/2025    Diagnosis: Major depression recurrent severe    Procedure:  ECT    HPI:     Patient seen.  Patient reports no cognitive or physical complaints      Medical History:  Past Medical History:    Cancer (HCC)    Skin cancer    Depression    Headache disorder    High blood pressure    High cholesterol    HTN (hypertension)    Hyperlipidemia    Nonspecific elevation of levels of transaminase or lactic acid dehydrogenase (LDH)    s/p liver biopsy normal    Visual impairment    glasses       Surgical History:  Past Surgical History:   Procedure Laterality Date    Colonoscopy & polypectomy  05/09/2016    diverticulosis and a small polyp was removed; ASC    Colonoscopy,biopsy N/A 05/09/2016    Procedure: COLONOSCOPY, POSSIBLE BIOPSY, POSSIBLE POLYPECTOMY 83942;  Surgeon: Shaun Mock MD;  Location: [facility]    Ect provided Bilateral 12/20/2024    1st. Tx. 12/20/24    Ect(bedside)      Other surgical history      scope knee    Other surgical history  11/29/2024    Aquablation of the prostate    Patient documented not to have experienced any of the following events N/A 05/09/2016    Procedure: COLONOSCOPY, POSSIBLE BIOPSY, POSSIBLE POLYPECTOMY 28490;  Surgeon: Shaun Mock MD;  Location: [facility]    Patient withough preoperative order for iv antibiotic surgical site infection prophylaxis. N/A 05/09/2016    Procedure: COLONOSCOPY, POSSIBLE BIOPSY, POSSIBLE POLYPECTOMY 88663;  Surgeon: Shaun Mock MD;  Location: [facility]    Special service or report  1990s    R knee arthroscopy lat meniscus       Family History:  Family History   Problem Relation Age of Onset    Hypertension Father      Diabetes Father     Lipids Brother     Cancer Brother         prostate ca    Other (Other[other]) Sister         ?sle       ROS:  Unremarkable    Physical Exam:   /80   Pulse 81   Temp 98 °F (36.7 °C) (Temporal)   Resp 14   Ht 67\"   SpO2 98%   BMI 27.62 kg/m²     General Appearance:    Alert, cooperative, no distress, appears stated age   Head:    Normocephalic, without obvious abnormality, atraumatic   Eyes:    PERRL, conjunctiva/corneas clear, EOM's intact       Nose:   Nares normal, septum midline, mucosa normal, no drainage    or sinus tenderness   Throat:   Lips, mucosa, and tongue normal; teeth and gums normal   Neck:   Supple, symmetrical, trachea midline   Lungs:     Clear to auscultation bilaterally, respirations unlabored    Heart:    Regular rate and rhythm, S1 and S2 normal, no murmur, rub   or gallop   Abdomen:     Soft, non-tender, bowel sounds active all four quadrants,     no masses, no organomegaly   Extremities:   Extremities normal, atraumatic, no cyanosis or edema   Pulses:   2+ and symmetric all extremities   Skin:   Skin color, texture, turgor normal, no rashes or lesions   Neurologic:   CNII-XII intact, normal strength, sensation and reflexes     throughout     Impressions & Plans: Major depression recurrent severe.  Bitemporal ECT    I have discussed the risks and benefits and alternatives with the patient/family.  They understand and agree to proceed with plan of care.    Bryn Taylor MD

## 2025-07-23 NOTE — ANESTHESIA PREPROCEDURE EVALUATION
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  Temp: 98 °F (36.7 °C)  Pulse: 86  Resp: 25  BP: 125/69  SpO2: 98 %  Body mass index is 27.62 kg/m².    Current medications reviewed.  Hospital Medications:  Current Medications[1]    Outpatient Medications:   Prescriptions Prior to Admission[2]    Allergies: Other and Seasonal      Anesthesia Evaluation    Patient summary reviewed.    Anesthetic Complications  (-) history of anesthetic complications         GI/Hepatic/Renal    Negative GI/hepatic/renal ROS.                             Cardiovascular      ECG reviewed.  Exercise tolerance: good     MET: >4      (+) hypertension                                     Endo/Other                                  Pulmonary    Negative pulmonary ROS.                       Neuro/Psych      (+) depression                                Past Surgical History[3]  Social Hx on file[4]  History   Drug Use No     Available pre-op labs reviewed.                     Airway      Mallampati: I      Neck ROM: limited Cardiovascular    Cardiovascular exam normal.         Dental    Dentition appears grossly intact         Pulmonary    Pulmonary exam normal.                 Other findings              ASA: 2   Plan: general  NPO status verified and patient meets guidelines.    Post-procedure pain management plan discussed with surgeon and patient.      Plan/risks discussed with: patient                Present on Admission:  **None**               [1]    lactated ringers infusion   Intravenous Continuous    [COMPLETED] ketorolac (Toradol) 30 MG/ML injection 15 mg  15 mg Intravenous Once    [COMPLETED] ondansetron (Zofran) 4 MG/2ML injection 8 mg  8 mg Intravenous Once    [COMPLETED]  caffeine-sodium benzoate 125-125 mg/mL injection  750 mg Intravenous Once    [COMPLETED] ketamine (Ketalar) 50 MG/ML injection        [COMPLETED] hydrALAzine (Apresoline) 20 mg/mL injection 10 mg  10 mg Intravenous Once   [2] (Not in a hospital admission)   [3]   Past Surgical History:  Procedure Laterality Date    Colonoscopy & polypectomy  05/09/2016    diverticulosis and a small polyp was removed; ASC    Colonoscopy,biopsy N/A 05/09/2016    Procedure: COLONOSCOPY, POSSIBLE BIOPSY, POSSIBLE POLYPECTOMY 24909;  Surgeon: Shaun Mock MD;  Location: [facility]    Ect provided Bilateral 12/20/2024    1st. Tx. 12/20/24    Ect(bedside)      Other surgical history      scope knee    Other surgical history  11/29/2024    Aquablation of the prostate    Patient documented not to have experienced any of the following events N/A 05/09/2016    Procedure: COLONOSCOPY, POSSIBLE BIOPSY, POSSIBLE POLYPECTOMY 98064;  Surgeon: Shaun Mock MD;  Location: [facility]    Patient withough preoperative order for iv antibiotic surgical site infection prophylaxis. N/A 05/09/2016    Procedure: COLONOSCOPY, POSSIBLE BIOPSY, POSSIBLE POLYPECTOMY 56368;  Surgeon: Shaun Mock MD;  Location: [facility]    Special service or report  1990s    R knee arthroscopy lat meniscus   [4]   Social History  Socioeconomic History    Marital status:    Tobacco Use    Smoking status: Never    Smokeless tobacco: Never   Vaping Use    Vaping status: Unknown   Substance and Sexual Activity    Alcohol use: No     Alcohol/week: 0.0 - 1.0 standard drinks of alcohol     Comment: rare    Drug use: No    Sexual activity: Yes

## 2025-07-23 NOTE — PROGRESS NOTES
[facility]  ECT Procedure Note    Robe Nunez Patient Status:  Outpatient   Age/Gender 75 year old male MRN [id number]   Location [facility] POST ANESTHESIA CARE UNIT Attending Bryn Taylor MD   Hosp Day # 0 PCP Mango Boland MD     7/23/2025    ECT Number: #26 total.  #14 in series    Diagnosis: Major Depression Recurrent Severe Without Psychotic Features. F33.2    Type of ECT:  Bitemporal    Place of Service:  Outpatient    Settings:   1.  Energy Percentage: 100%    2.  Program:  DGX    Pre-ECT Evaluation    Symptoms:  Patient seen.  Patient reports positive stabilization of mood.  Patient reports neurovegetative symptoms and anxiety have been stable.  No pato or psychosis.  No suicidal thoughts.  No cognitive or physical complaints.  Patient shows capacity to make decisions.  Discussed risks and benefits of longer term maintenance ECT.  Patient at this point is elected to stop ECT.  Patient understands risks of stopping ECT including potential risks of return of depression and we discussed importance to seek reevaluation in reinstatement of ECT treatment early if return of depressive symptoms.  We will follow up as needed.    Risk/Benefits:  Discussed with patient side effects of ECT including headache, teeth, jaw, cardiac, pulmonary, NPO, aspiration, allergic reactions, anesthetic reactions, musculoskeletal, neurologic, morbidity/mortality, potential lack of efficacy, unilateral/bilateral ECT, relapse/maintenance issues, cognitive risks including memory, concentration, cognition, and other risks.    Side Effects:  Patient notes no cognitive/physical complaints    Exam:  Mood: less depressed, less anxious, and approaching baseline  Affect: Congruent  Memory:  intact immediate, recent, remote and as evidenced by ability to present consistent history  Concentration:   good  Suicidal ideation: no suicidal ideation    Prior to procedure, reviewed with treatment team correct  patient, time of procedure and type of ECT.  Also reviewed with anesthesia pre-ECT medications.    Patient Monitored:  B/P, EKG, EEG, Pulse Ox, Left Ankle Cuff    Pre-ECT Medications: Toradol 15 mg IV, Hydralazine 10 mg IV 30 min prior to treatment, and Zofran 8 mg IV and caffeine 750 mg IV    ECT Medications:  Labetalol 5 mg IV, Anesthetic  Etomidate 14 mg IV followed by ketamine 25 mg IV, and Succinylcholine 80 mg IV and extensive use of hyperventilation    Seizure Duration:  Motor: 42 seconds       EE seconds    Post-ECT Condition:  Treatment unremarkable    Post-ECT Medications:  None     Bryn Taylor MD

## 2025-07-23 NOTE — DISCHARGE INSTRUCTIONS
----- Message from Yogesh Michel sent at 3/22/2023 10:00 AM CDT -----  Regarding: [facility] - INJECTION  Contact: self  Pt ask if she can receive injections in her knees in about a month or so, not the gel, the other one. Pt ask for a call to advise      Contact info  [phone] (home)        Discharge Instructions  Electroconvulsive Therapy    Activities:  You MUST arrange to have a responsible adult drive you home and have a responsible adult stay with you the rest of the day and overnight.  Do not drive today.  Do not operate any machinery today. Use kitchen equipment with caution.  Rest and take it easy today.  Do not take public transportation without the presence of another responsible adult for 24 hours    Medications:  Resume your regular medications when you get home  The nurse will instruct you not to take any NSAIDS (Advil, Aleve, Motrin, Ibuprofen) before 1 pm today because you were given a certain medication during the procedure.    Diet:  You may resume your regular diet when you get home  Do not drink alcohol for 24 hours    Additional Instructions:  Someone should call you to schedule any upcoming ECT treatments. Call as needed to schedule or cancel your ECT appointments [phone].  If you have any questions or concerns, please call your own psychiatrist.  For your safety, please do not wear make-up to any future ECT appointments.  For any questions regarding your ECT appointment, please call [facility] [phone].  For cancellations after hours, call [phone] and leave a message.    Expected Recovery:  As you awaken, you may experience one or more of the following:  Headache, nausea, temporary confusion, or muscle stiffness.  If these symptoms increase, become severe or are accompanied by a fever of more than 101, please seek medical attention.  The ECT may affect memory.  Many patients report loss of memory for events that occurred in the days, weeks or months surrounding the ECT.  Many of these memories may return, but not always.  Short-term memory may also be affected for months, but this can also be a result of the disorder that you have.

## 2025-07-23 NOTE — ANESTHESIA POSTPROCEDURE EVALUATION
[facility]    Robe Nunez Patient Status:  Outpatient   Age/Gender 75 year old male MRN [id number]   Location [facility] POST ANESTHESIA CARE UNIT Attending Bryn Taylor MD   Hosp Day # 0 PCP Mango Boland MD       Anesthesia Post-op Note        Procedure Summary       Date: 07/23/25 Room / Location: [facility] Post Anesthesia Care Unit    Anesthesia Start: 0707 Anesthesia Stop: 0723    Procedure: ECT(BEDSIDE) Diagnosis: Severe recurrent major depression without psychotic features (HCC)    Scheduled Providers:  Anesthesiologist: Kayla Borden MD    Anesthesia Type: general ASA Status: 2            Anesthesia Type: No value filed.    Vitals Value Taken Time   /70 07/23/25 07:23   Temp 97.5 07/23/25 07:23   Pulse 59 07/23/25 07:23   Resp 16 07/23/25 07:23   SpO2 96 07/23/25 07:23           Patient Location: PACU    Anesthesia Type: general    Airway Patency: patent    Postop Pain Control: adequate    Mental Status: mildly sedated but able to meaningfully participate in the post-anesthesia evaluation    Nausea/Vomiting: none    Cardiopulmonary/Hydration status: stable euvolemic    Complications: no apparent anesthesia related complications    Postop vital signs: stable    Dental Exam: Unchanged from Preop    Patient to be discharged from PACU when criteria met.

## 2025-08-19 ENCOUNTER — LAB ENCOUNTER (OUTPATIENT)
Dept: LAB | Facility: HOSPITAL | Age: 75
End: 2025-08-19
Attending: Other

## 2025-08-19 DIAGNOSIS — Z79.899 ENCOUNTER FOR LONG-TERM (CURRENT) USE OF MEDICATIONS: ICD-10-CM

## 2025-08-19 LAB
CHOLEST SERPL-MCNC: 134 MG/DL (ref ?–200)
EST. AVERAGE GLUCOSE BLD GHB EST-MCNC: 117 MG/DL (ref 68–126)
FASTING PATIENT GLUCOSE ANSWER: YES
FASTING PATIENT LIPID ANSWER: YES
GLUCOSE BLD-MCNC: 111 MG/DL (ref 70–99)
HBA1C MFR BLD: 5.7 % (ref ?–5.7)
HDLC SERPL-MCNC: 54 MG/DL (ref 40–59)
LDLC SERPL CALC-MCNC: 54 MG/DL (ref ?–100)
NONHDLC SERPL-MCNC: 80 MG/DL (ref ?–130)
TRIGL SERPL-MCNC: 151 MG/DL (ref 30–149)
VLDLC SERPL CALC-MCNC: 22 MG/DL (ref 0–30)

## 2025-08-19 PROCEDURE — 82947 ASSAY GLUCOSE BLOOD QUANT: CPT

## 2025-08-19 PROCEDURE — 83036 HEMOGLOBIN GLYCOSYLATED A1C: CPT

## 2025-08-19 PROCEDURE — 80061 LIPID PANEL: CPT

## 2025-08-19 PROCEDURE — 36415 COLL VENOUS BLD VENIPUNCTURE: CPT

## (undated) DEVICE — Device

## (undated) DEVICE — GLOVE SURG 7.5 PROTEXIS LF CRM PF SMTH BEAD CUFF STRL

## (undated) DEVICE — WATER STRL PLASTIC POUR BTL 500 ML

## (undated) DEVICE — CATHETER BARDEX LBRCTH 22FR 30CC FOLEY 3W 2 STAGGER DRN EYE

## (undated) DEVICE — BAG DRN 4000ML LG CAPACITY RND TRDRP CNTR ENTRY SMPL PORT

## (undated) DEVICE — DRAPE PK EQUIPMENT AQUABEAM DISP

## (undated) DEVICE — ELECTRODE ESURG 12D 16D LG LOOP STD 24FR HFRQ RESCT

## (undated) DEVICE — DRAPE USER INTERFACE

## (undated) DEVICE — GOWN SURG XL L3 NONREINFORCE SET IN SLV STRL LF DISP BLUE

## (undated) DEVICE — SOLUTION IRR 1000ML 0.9% NACL PLASTIC POUR BTL ISTNC N-PYRG

## (undated) DEVICE — DRAPE .75 SHT FNFLD 76X52IN SURG CNVRT STRL LF DISP TIBURON

## (undated) DEVICE — TUBING SCT CLR 12FT 316IN MDVC MAXI-GRIP NCDTV MALE TO MALE

## (undated) DEVICE — COVER STAND 23IN MAYO CNVRT PLASTIC REINFORCE STRL LF DISP

## (undated) DEVICE — HANDPIECE HYDROS AQUABLATION

## (undated) DEVICE — ELECTRODE PT RTN L9 FT MEGADYNE ADULT 2 PLATE CORD

## (undated) DEVICE — SYRINGE 50ML GRAD N-PYRG DEHP-FR PVC FREE STRL CATHTP LF

## (undated) DEVICE — GLOVE SURG 6.5 DRMPRN ULTRA LF DARK GRN PF SMTH BEAD CUFF

## (undated) DEVICE — DRAPE UNDER BUTT FLTR SCRN FL CNTRL POUCH DRN PORT GRAD